# Patient Record
Sex: FEMALE | Race: BLACK OR AFRICAN AMERICAN | NOT HISPANIC OR LATINO | Employment: OTHER | URBAN - METROPOLITAN AREA
[De-identification: names, ages, dates, MRNs, and addresses within clinical notes are randomized per-mention and may not be internally consistent; named-entity substitution may affect disease eponyms.]

---

## 2017-06-01 ENCOUNTER — OFFICE VISIT (OUTPATIENT)
Dept: CARDIOLOGY | Facility: CLINIC | Age: 57
End: 2017-06-01

## 2017-06-01 ENCOUNTER — HOSPITAL ENCOUNTER (OUTPATIENT)
Dept: GENERAL RADIOLOGY | Facility: HOSPITAL | Age: 57
Discharge: HOME | End: 2017-06-01
Attending: INTERNAL MEDICINE | Admitting: INTERNAL MEDICINE

## 2017-06-01 VITALS
WEIGHT: 222 LBS | SYSTOLIC BLOOD PRESSURE: 110 MMHG | BODY MASS INDEX: 36.99 KG/M2 | HEIGHT: 65 IN | HEART RATE: 81 BPM | DIASTOLIC BLOOD PRESSURE: 78 MMHG

## 2017-06-01 DIAGNOSIS — G47.33 OSA (OBSTRUCTIVE SLEEP APNEA): ICD-10-CM

## 2017-06-01 DIAGNOSIS — R53.83 FATIGUE, UNSPECIFIED TYPE: ICD-10-CM

## 2017-06-01 DIAGNOSIS — R07.2 PRECORDIAL PAIN: Primary | ICD-10-CM

## 2017-06-01 DIAGNOSIS — R06.09 DOE (DYSPNEA ON EXERTION): ICD-10-CM

## 2017-06-01 PROBLEM — I10 HYPERTENSION: Status: ACTIVE | Noted: 2017-06-01

## 2017-06-01 PROCEDURE — 99204 OFFICE O/P NEW MOD 45 MIN: CPT | Performed by: INTERNAL MEDICINE

## 2017-06-01 PROCEDURE — 71020 HC CHEST PA AND LATERAL: CPT

## 2017-06-01 PROCEDURE — 93000 ELECTROCARDIOGRAM COMPLETE: CPT | Performed by: INTERNAL MEDICINE

## 2017-06-01 RX ORDER — GABAPENTIN 300 MG/1
200 CAPSULE ORAL EVERY EVENING
COMMUNITY
End: 2021-10-09 | Stop reason: HOSPADM

## 2017-06-01 NOTE — PROGRESS NOTES
Date of Office Visit: 2017  Encounter Provider: Ivone Espinoza MD  Place of Service: [facility] CARDIOLOGY  Patient Name: Brodie Steve  :1960    Chief complaint  Consult requested by Dr. Pierce for evaluation of chest pain shortness of breath exertional fatigue     History of Present Illness  Patient is a pleasant 56-year-old female with history of hypertension untreated sleep apnea and GE reflux disease.  In .  Been seen for chest discomfort with a negative stress echocardiogram.  Subsequent Holter year later was unremarkable He has no other prior cardiac history.  She states however over the past 3 months she has not been using her CPAP due to worsening reflux and emesis.  She has not been exercising and that time and has gained approximately 30 pounds.  In this setting over the past 3 weeks she's had midsternal chest discomfort that's nonradiating that occur sporadically last less than 5 minutes.  On occasion it can occur with exertion at times at rest and at times occur to be more positional when she is laying down.  This associated with mild shortness of breath    Past Medical History:   Diagnosis Date   • Cough    • Fibromyalgia    • Frequent urination    • Gallbladder disease    • GERD (gastroesophageal reflux disease)    • Hemorrhoids    • Hyperlipidemia    • Hypersomnolence    • Hypertension    • Nasal congestion    • JENAE (obstructive sleep apnea)     mild-severe   • Postnasal drip    • Sleep apnea    • Snoring    • Subcutaneous abscess    • Weight gain      Past Surgical History:   Procedure Laterality Date   • CHOLECYSTECTOMY     • COLONOSCOPY  approx     normal per patient   • LYMPH NODE BIOPSY     • UPPER GASTROINTESTINAL ENDOSCOPY  approx     gerd per patient     Outpatient Medications Prior to Visit   Medication Sig Dispense Refill   • celecoxib (CeleBREX) 200 MG capsule Take 200 mg by mouth Daily.     • chlorothiazide (DIURIL) 250 MG tablet  Take 250 mg by mouth Every Morning.     • Cholecalciferol (VITAMIN D3) 2000 UNITS capsule Take  by mouth.     • gabapentin (NEURONTIN) 100 MG capsule Take 200 mg by mouth Every Morning.     • guaiFENesin (MUCINEX) 600 MG 12 hr tablet Take  by mouth.     • lansoprazole (PREVACID) 30 MG capsule TAKE ONE CAPSULE BY MOUTH EVERY DAY 90 capsule 3   • loratadine (CLARITIN REDITABS) 10 MG disintegrating tablet Take 10 mg by mouth Daily.     • olmesartan (BENICAR) 20 MG tablet Take 20 mg by mouth Daily.     • Thyroid 30 MG PO tablet Take 30 mg by mouth Daily.     • Triamcinolone Acetonide (NASACORT) 55 MCG/ACT nasal inhaler 2 sprays into each nostril Daily.     • amLODIPine (NORVASC) 5 MG tablet Take 5 mg by mouth Daily.     • cholecalciferol (VITAMIN D3) 1000 UNITS tablet Take 1,000 Units by mouth Daily.     • gabapentin (NEURONTIN) 100 MG capsule Take 300 mg by mouth.     • Milnacipran HCl (SAVELLA) 12.5 MG tablet Take  by mouth.     • omeprazole (priLOSEC) 40 MG capsule Take  by mouth.     • spironolactone (ALDACTONE) 50 MG tablet Take  by mouth.     • sulfamethoxazole-trimethoprim (BACTRIM DS,SEPTRA DS) 800-160 MG per tablet Take  by mouth.       No facility-administered medications prior to visit.      Allergies as of 06/01/2017 - Pierre as Reviewed 06/01/2017   Allergen Reaction Noted   • Codeine GI Intolerance 02/11/2014   • Morphine and related Other (See Comments) 08/11/2016   • Amoxicillin Rash 02/11/2014   • Hctz [hydrochlorothiazide] Rash 02/11/2014     Social History     Social History   • Marital status:      Spouse name: N/A   • Number of children: N/A   • Years of education: N/A     Occupational History   • Not on file.     Social History Main Topics   • Smoking status: Never Smoker   • Smokeless tobacco: Not on file   • Alcohol use Yes      Comment: social   • Drug use: No   • Sexual activity: Not on file     Other Topics Concern   • Not on file     Social History Narrative     Family History   Problem  "Relation Age of Onset   • Autoimmune disease Other    • Heart disease Other      cardiac disorder   • Hypertension Other    • Thyroid disease Other    • Obesity Other    • Prostate cancer Other    • Myasthenia gravis Other    • Vitiligo Other    • Hypertension Mother    • Lung cancer Mother    • Heart disease Mother    • Stroke Mother    • Heart disease Father    • Hypertension Sister    • Hypertension Brother      Review of Systems   Constitution: Negative for fever, malaise/fatigue, weight gain and weight loss.   HENT: Negative for ear pain, hearing loss, nosebleeds and sore throat.    Eyes: Negative for double vision, pain, vision loss in left eye and vision loss in right eye.   Cardiovascular:        See history of present illness.   Respiratory: Positive for shortness of breath and snoring. Negative for cough, sleep disturbances due to breathing and wheezing.    Endocrine: Negative for cold intolerance, heat intolerance and polyuria.   Skin: Negative for itching, poor wound healing and rash.   Musculoskeletal: Negative for joint pain, joint swelling and myalgias.   Gastrointestinal: Negative for abdominal pain, diarrhea, hematochezia, nausea and vomiting.   Genitourinary: Negative for hematuria and hesitancy.   Neurological: Positive for excessive daytime sleepiness. Negative for numbness, paresthesias and seizures.   Psychiatric/Behavioral: Negative for depression. The patient is not nervous/anxious.      Objective:     Vitals:    06/01/17 1023 06/01/17 1042   BP: 114/80 110/78   BP Location: Right arm Left arm   Pulse: 81    Weight: 222 lb (101 kg)    Height: 65\" (165.1 cm)      Body mass index is 36.94 kg/(m^2).    Physical Exam   Constitutional: She is oriented to person, place, and time. She appears well-developed and well-nourished.   Obese   HENT:   Head: Normocephalic.   Nose: Nose normal.   Mouth/Throat: Oropharynx is clear and moist.   Eyes: Conjunctivae and EOM are normal. Pupils are equal, round, " and reactive to light. Right eye exhibits no discharge. No scleral icterus.   Neck: Normal range of motion. Neck supple. No JVD present. No thyromegaly present.   Cardiovascular: Normal rate, regular rhythm, normal heart sounds and intact distal pulses.  Exam reveals no gallop and no friction rub.    No murmur heard.  Pulses:       Carotid pulses are 2+ on the right side, and 2+ on the left side.       Radial pulses are 2+ on the right side, and 2+ on the left side.        Femoral pulses are 2+ on the right side, and 2+ on the left side.       Popliteal pulses are 2+ on the right side, and 2+ on the left side.        Dorsalis pedis pulses are 2+ on the right side, and 2+ on the left side.        Posterior tibial pulses are 2+ on the right side, and 2+ on the left side.   Pulmonary/Chest: Effort normal and breath sounds normal. No respiratory distress. She has no wheezes. She has no rales.   Abdominal: Soft. Bowel sounds are normal. She exhibits no distension. There is no hepatosplenomegaly. There is no tenderness. There is no rebound.   Musculoskeletal: Normal range of motion. She exhibits no edema or tenderness.   Neurological: She is alert and oriented to person, place, and time.   Skin: Skin is warm and dry. No rash noted. No erythema.   Psychiatric: She has a normal mood and affect. Her behavior is normal. Judgment and thought content normal.   Vitals reviewed.    Lab Review:     ECG 12 Lead  Date/Time: 6/1/2017 6:38 PM  Performed by: JAZMIN PENA  Authorized by: JAZMIN PENA   Comparison: not compared with previous ECG   Rhythm: sinus rhythm  Conduction comments: QTc = 455 msec  Clinical impression: normal ECG          Assessment:       Diagnosis Plan   1. JENAE (obstructive sleep apnea)  Ambulatory Referral to Sleep Medicine   2. Precordial pain  Adult Stress Echo With Color & Doppler    XR Chest 2 View   3. BESS (dyspnea on exertion)  Adult Stress Echo With Color & Doppler    XR Chest 2 View   4. Fatigue,  unspecified type       Plan:       1.  Chest pain.  Anginal and atypical features will check chest x-ray as well as a stress echocardiogram to assess for ischemic heart disease.  2.  Untreated sleep apnea.  Requested a follow-up appointment with Dr. Harman  3.  Dyspnea.  We'll check a chest x-ray  She reflux disease     CalvinBrodie mcgee   Home Medication Instructions DORINDA:    Printed on:06/04/17 7933   Medication Information                      celecoxib (CeleBREX) 200 MG capsule  Take 200 mg by mouth Daily.             chlorothiazide (DIURIL) 250 MG tablet  Take 250 mg by mouth Every Morning.             Cholecalciferol (VITAMIN D3) 2000 UNITS capsule  Take  by mouth.             Flunisolide HFA (AEROSPAN) 80 MCG/ACT aerosol solution  Inhale.             gabapentin (NEURONTIN) 100 MG capsule  Take 200 mg by mouth Every Morning.             gabapentin (NEURONTIN) 300 MG capsule  Take 300 mg by mouth Every Evening.             guaiFENesin (MUCINEX) 600 MG 12 hr tablet  Take  by mouth.             lansoprazole (PREVACID) 30 MG capsule  TAKE ONE CAPSULE BY MOUTH EVERY DAY             loratadine (CLARITIN REDITABS) 10 MG disintegrating tablet  Take 10 mg by mouth Daily.             olmesartan (BENICAR) 20 MG tablet  Take 20 mg by mouth Daily.             Thyroid 30 MG PO tablet  Take 30 mg by mouth Daily.             Triamcinolone Acetonide (NASACORT) 55 MCG/ACT nasal inhaler  2 sprays into each nostril Daily.               Dictated utilizing Dragon dictation

## 2017-06-02 ENCOUNTER — TELEPHONE (OUTPATIENT)
Dept: CARDIOLOGY | Facility: CLINIC | Age: 57
End: 2017-06-02

## 2017-06-02 NOTE — TELEPHONE ENCOUNTER
----- Message from Ivone Espinoza MD sent at 6/2/2017  3:13 PM EDT -----  Let patient know chest x-ray is normal. tita

## 2017-06-07 ENCOUNTER — HOSPITAL ENCOUNTER (OUTPATIENT)
Dept: SLEEP MEDICINE | Facility: HOSPITAL | Age: 57
Discharge: HOME | End: 2017-06-07
Admitting: INTERNAL MEDICINE

## 2017-06-07 DIAGNOSIS — R06.81 GERD WITH APNEA: ICD-10-CM

## 2017-06-07 DIAGNOSIS — G47.10 HYPERSOMNIA: ICD-10-CM

## 2017-06-07 DIAGNOSIS — E66.9 OBESITY (BMI 30-39.9): ICD-10-CM

## 2017-06-07 DIAGNOSIS — R06.83 SNORING: ICD-10-CM

## 2017-06-07 DIAGNOSIS — K21.9 GERD WITH APNEA: ICD-10-CM

## 2017-06-07 DIAGNOSIS — G47.33 OSA (OBSTRUCTIVE SLEEP APNEA): Primary | ICD-10-CM

## 2017-06-07 PROCEDURE — G0463 HOSPITAL OUTPT CLINIC VISIT: HCPCS

## 2017-06-07 NOTE — PROGRESS NOTES
HISTORY OF PRESENT ILLNESS: This is a 56-year-old female who has history of sleep apnea, was tested in 2006, and she was on a CPAP at 12 cm.  She stopped using 6 months ago. However, she also has put on about 25 to 40 pounds in weight and her symptoms are worse. She also has severe GERD. Normally, she goes to bed around 11 p.m. and wakes up at 7 a.m. and feels very tired and groggy. She does have snoring and daytime excessive sleepiness with [place] Sleepiness Scale of 17.  She snores loudly and has excessive sweating at night.     PAST MEDICAL HISTORY:   1.  Sleep apnea.   2.  Severe GERD.   3.  Hypertension.   4.  Fibromyalgia.   5.  Hypothyroidism.     MEDICATIONS:   1.  Thyroid extract.  2.  Lansoprazole 30 mg twice a day.   3.  Celebrex 200 mg in the morning.  4.  Benicar 300 mg a day.  5.  Hydrochlorothiazide 1 tablet a day.  6.  Gabapentin 100 mg 2 tablets in the morning and 300 mg at nighttime.  7.  Vitamin D.  8.  Claritin.     FAMILY HISTORY: Positive for heart disease, blood pressure, myasthenia gravis, thyroid disease, obesity, stroke, lung cancer, sleep apnea.     SOCIAL HISTORY: She is a , does not smoke cigarettes, does not use any recreational drugs. She drinks 1 or 2 glasses of alcoholic beverages per month. She does drink 3-4 cups of coffee and tea.     REVIEW OF SYSTEMS: The patient has severe GERD symptoms and also has daytime excessive sleepiness with [place] Sleepiness Scale of 17 and does have snoring and daytime tiredness and exhaustion.    PHYSICAL EXAMINATION:  GENERAL/VITAL SIGNS:  This is an adult female who is not in acute respiratory distress.  She weighs 224 pounds and she is 5 feet 5 inches tall. Blood pressure is 128/89, pulse is 85, neck size is 14-1/2 inches. Body mass index is 39.   HEENT: Unremarkable. Pupils are round, equal and reacting.   NECK: No JVD. Throat is clear. Oral airway is Mallampati class III.   RESPIRATORY: Breath sounds are equal on both sides.  No wheezes or crackles.   CARDIOVASCULAR: Heart rate is regular without murmur.   ABDOMEN: Soft.   EXTREMITIES: No cyanosis, clubbing, or edema.     ASSESSMENT AND PLAN:   1.  Obstructive sleep apnea. She does have obstructive sleep apnea, in the past she was on CPAP. However, she has put on weight and also she is not using her CPAP. She needs a repeat sleep study to reassess her sleep apnea. She also has severe GERD with sleep apnea. I am going to proceed with a split-night study and follow her after the sleep study is done.   2.  Severe GERD with sleep apnea.   3.  Hypertension.   4.  Weight gain with obesity. I have talked to the patient about weight reduction and the health benefits.   5.  History of fibromyalgia.  6.  Hypertension.   7.  Hypothyroidism.       Nael Whaley MD  RBR/pr  D:  06/07/2017 11:59:19   T:  06/07/2017 15:58:51   Job ID:  [id number]   Document ID:  [id number]  cc:  Ivone Espinoza MD

## 2017-06-14 ENCOUNTER — HOSPITAL ENCOUNTER (OUTPATIENT)
Dept: CARDIOLOGY | Facility: HOSPITAL | Age: 57
Discharge: HOME | End: 2017-06-14
Attending: INTERNAL MEDICINE | Admitting: INTERNAL MEDICINE

## 2017-06-14 VITALS
SYSTOLIC BLOOD PRESSURE: 120 MMHG | WEIGHT: 222 LBS | OXYGEN SATURATION: 99 % | DIASTOLIC BLOOD PRESSURE: 94 MMHG | HEIGHT: 65 IN | HEART RATE: 87 BPM | BODY MASS INDEX: 36.99 KG/M2

## 2017-06-14 DIAGNOSIS — R06.09 DOE (DYSPNEA ON EXERTION): ICD-10-CM

## 2017-06-14 DIAGNOSIS — R07.2 PRECORDIAL PAIN: ICD-10-CM

## 2017-06-14 LAB
BH CV ECHO MEAS - ACS: 2 CM
BH CV ECHO MEAS - AO MAX PG: 4.8 MMHG
BH CV ECHO MEAS - AO ROOT AREA (BSA CORRECTED): 1.5
BH CV ECHO MEAS - AO ROOT AREA: 7.5 CM^2
BH CV ECHO MEAS - AO ROOT DIAM: 3.1 CM
BH CV ECHO MEAS - AO V2 MAX: 109.9 CM/SEC
BH CV ECHO MEAS - BSA(HAYCOCK): 2.2 M^2
BH CV ECHO MEAS - BSA: 2.1 M^2
BH CV ECHO MEAS - BZI_BMI: 36.9 KILOGRAMS/M^2
BH CV ECHO MEAS - BZI_METRIC_HEIGHT: 165.1 CM
BH CV ECHO MEAS - BZI_METRIC_WEIGHT: 100.7 KG
BH CV ECHO MEAS - CONTRAST EF 4CH: 68.3 ML/M^2
BH CV ECHO MEAS - EDV(MOD-SP4): 82 ML
BH CV ECHO MEAS - EDV(TEICH): 92.2 ML
BH CV ECHO MEAS - EF(CUBED): 72.6 %
BH CV ECHO MEAS - EF(MOD-SP4): 68.3 %
BH CV ECHO MEAS - EF(TEICH): 64.5 %
BH CV ECHO MEAS - ESV(MOD-SP4): 26 ML
BH CV ECHO MEAS - ESV(TEICH): 32.7 ML
BH CV ECHO MEAS - FS: 35 %
BH CV ECHO MEAS - IVS/LVPW: 1
BH CV ECHO MEAS - IVSD: 0.96 CM
BH CV ECHO MEAS - LAT PEAK E' VEL: 10 CM/SEC
BH CV ECHO MEAS - LV DIASTOLIC VOL/BSA (35-75): 39.7 ML/M^2
BH CV ECHO MEAS - LV MASS(C)D: 144.7 GRAMS
BH CV ECHO MEAS - LV MASS(C)DI: 70 GRAMS/M^2
BH CV ECHO MEAS - LV SYSTOLIC VOL/BSA (12-30): 12.6 ML/M^2
BH CV ECHO MEAS - LVIDD: 4.5 CM
BH CV ECHO MEAS - LVIDS: 2.9 CM
BH CV ECHO MEAS - LVLD AP4: 7.4 CM
BH CV ECHO MEAS - LVLS AP4: 6.5 CM
BH CV ECHO MEAS - LVPWD: 0.96 CM
BH CV ECHO MEAS - MED PEAK E' VEL: 8 CM/SEC
BH CV ECHO MEAS - MV A DUR: 0.11 SEC
BH CV ECHO MEAS - MV A MAX VEL: 74.6 CM/SEC
BH CV ECHO MEAS - MV DEC SLOPE: 267.7 CM/SEC^2
BH CV ECHO MEAS - MV DEC TIME: 0.24 SEC
BH CV ECHO MEAS - MV E MAX VEL: 54.4 CM/SEC
BH CV ECHO MEAS - MV E/A: 0.73
BH CV ECHO MEAS - MV P1/2T MAX VEL: 57 CM/SEC
BH CV ECHO MEAS - MV P1/2T: 62.3 MSEC
BH CV ECHO MEAS - MVA P1/2T LCG: 3.9 CM^2
BH CV ECHO MEAS - MVA(P1/2T): 3.5 CM^2
BH CV ECHO MEAS - PULM A REVS DUR: 0.11 SEC
BH CV ECHO MEAS - PULM A REVS VEL: 26.1 CM/SEC
BH CV ECHO MEAS - PULM DIAS VEL: 44.6 CM/SEC
BH CV ECHO MEAS - PULM S/D: 0.66
BH CV ECHO MEAS - PULM SYS VEL: 29.6 CM/SEC
BH CV ECHO MEAS - SI(CUBED): 31.9 ML/M^2
BH CV ECHO MEAS - SI(MOD-SP4): 27.1 ML/M^2
BH CV ECHO MEAS - SI(TEICH): 28.7 ML/M^2
BH CV ECHO MEAS - SV(CUBED): 65.9 ML
BH CV ECHO MEAS - SV(MOD-SP4): 56 ML
BH CV ECHO MEAS - SV(TEICH): 59.4 ML
BH CV ECHO MEAS - TAPSE (>1.6): 1.7 CM2
BH CV STRESS BP STAGE 1: NORMAL
BH CV STRESS BP STAGE 2: NORMAL
BH CV STRESS DURATION MIN STAGE 1: 3
BH CV STRESS DURATION MIN STAGE 2: 3
BH CV STRESS DURATION SEC STAGE 1: 0
BH CV STRESS DURATION SEC STAGE 2: 0
BH CV STRESS ECHO POST STRESS EJECTION FRACTION EF: 78 %
BH CV STRESS GRADE STAGE 1: 10
BH CV STRESS GRADE STAGE 2: 12
BH CV STRESS HR STAGE 1: 127
BH CV STRESS HR STAGE 2: 155
BH CV STRESS METS STAGE 1: 5
BH CV STRESS METS STAGE 2: 7.5
BH CV STRESS O2 STAGE 1: 100
BH CV STRESS O2 STAGE 2: 94
BH CV STRESS PROTOCOL 1: NORMAL
BH CV STRESS SPEED STAGE 1: 1.7
BH CV STRESS SPEED STAGE 2: 2.5
BH CV STRESS STAGE 1: 1
BH CV STRESS STAGE 2: 2
BH CV XLRA - RV BASE: 2.7 CM
BH CV XLRA - TDI S': 11 CM/SEC
E/E' RATIO: 6
LEFT ATRIUM VOLUME INDEX: 9 ML/M2
MAXIMAL PREDICTED HEART RATE: 162 BPM
PERCENT MAX PREDICTED HR: 95.68 %
STRESS BASELINE BP: NORMAL MMHG
STRESS BASELINE HR: 87 BPM
STRESS PERCENT HR: 113 %
STRESS POST ESTIMATED WORKLOAD: 7.5 METS
STRESS POST EXERCISE DUR MIN: 6 MIN
STRESS POST EXERCISE DUR SEC: 0 SEC
STRESS POST O2 SAT PEAK: 94 %
STRESS POST PEAK BP: NORMAL MMHG
STRESS POST PEAK HR: 155 BPM
STRESS TARGET HR: 138 BPM

## 2017-06-14 PROCEDURE — 93352 ADMIN ECG CONTRAST AGENT: CPT | Performed by: INTERNAL MEDICINE

## 2017-06-14 PROCEDURE — 93017 CV STRESS TEST TRACING ONLY: CPT

## 2017-06-14 PROCEDURE — 93320 DOPPLER ECHO COMPLETE: CPT

## 2017-06-14 PROCEDURE — 93325 DOPPLER ECHO COLOR FLOW MAPG: CPT | Performed by: INTERNAL MEDICINE

## 2017-06-14 PROCEDURE — 93016 CV STRESS TEST SUPVJ ONLY: CPT | Performed by: INTERNAL MEDICINE

## 2017-06-14 PROCEDURE — C8928 TTE W OR W/O FOL W/CON,STRES: HCPCS

## 2017-06-14 PROCEDURE — 93320 DOPPLER ECHO COMPLETE: CPT | Performed by: INTERNAL MEDICINE

## 2017-06-14 PROCEDURE — 25010000002 PERFLUTREN (DEFINITY) 8.476 MG IN SODIUM CHLORIDE 10 ML INJECTION: Performed by: INTERNAL MEDICINE

## 2017-06-14 PROCEDURE — 93018 CV STRESS TEST I&R ONLY: CPT | Performed by: INTERNAL MEDICINE

## 2017-06-14 PROCEDURE — 93350 STRESS TTE ONLY: CPT | Performed by: INTERNAL MEDICINE

## 2017-06-14 PROCEDURE — 93325 DOPPLER ECHO COLOR FLOW MAPG: CPT

## 2017-06-14 RX ADMIN — PERFLUTREN 3 ML: 6.52 INJECTION, SUSPENSION INTRAVENOUS at 10:03

## 2017-06-15 ENCOUNTER — APPOINTMENT (OUTPATIENT)
Dept: WOMENS IMAGING | Facility: HOSPITAL | Age: 57
End: 2017-06-15

## 2017-06-15 PROCEDURE — 77080 DXA BONE DENSITY AXIAL: CPT | Performed by: RADIOLOGY

## 2017-06-15 PROCEDURE — 77067 SCR MAMMO BI INCL CAD: CPT | Performed by: RADIOLOGY

## 2017-06-15 PROCEDURE — G0202 SCR MAMMO BI INCL CAD: HCPCS | Performed by: RADIOLOGY

## 2017-06-16 ENCOUNTER — TELEPHONE (OUTPATIENT)
Dept: CARDIOLOGY | Facility: CLINIC | Age: 57
End: 2017-06-16

## 2017-06-22 ENCOUNTER — HOSPITAL ENCOUNTER (OUTPATIENT)
Dept: SLEEP MEDICINE | Facility: HOSPITAL | Age: 57
Discharge: HOME | End: 2017-06-22
Admitting: INTERNAL MEDICINE

## 2017-06-22 VITALS — WEIGHT: 224 LBS | BODY MASS INDEX: 37.32 KG/M2 | HEIGHT: 65 IN

## 2017-06-22 DIAGNOSIS — R06.81 GERD WITH APNEA: ICD-10-CM

## 2017-06-22 DIAGNOSIS — K21.9 GERD WITH APNEA: ICD-10-CM

## 2017-06-22 DIAGNOSIS — R06.83 SNORING: ICD-10-CM

## 2017-06-22 DIAGNOSIS — G47.10 HYPERSOMNIA: ICD-10-CM

## 2017-06-22 DIAGNOSIS — E66.9 OBESITY (BMI 30-39.9): ICD-10-CM

## 2017-06-22 DIAGNOSIS — G47.33 OSA (OBSTRUCTIVE SLEEP APNEA): ICD-10-CM

## 2017-06-22 PROCEDURE — 95810 POLYSOM 6/> YRS 4/> PARAM: CPT

## 2017-07-18 ENCOUNTER — TELEPHONE (OUTPATIENT)
Dept: SLEEP MEDICINE | Facility: HOSPITAL | Age: 57
End: 2017-07-18

## 2017-09-11 ENCOUNTER — OFFICE VISIT (OUTPATIENT)
Dept: CARDIOLOGY | Facility: CLINIC | Age: 57
End: 2017-09-11

## 2017-09-11 VITALS
WEIGHT: 225.4 LBS | DIASTOLIC BLOOD PRESSURE: 76 MMHG | HEIGHT: 66 IN | BODY MASS INDEX: 36.22 KG/M2 | HEART RATE: 85 BPM | SYSTOLIC BLOOD PRESSURE: 118 MMHG

## 2017-09-11 DIAGNOSIS — I10 ESSENTIAL HYPERTENSION: Primary | ICD-10-CM

## 2017-09-11 DIAGNOSIS — G47.33 OSA (OBSTRUCTIVE SLEEP APNEA): ICD-10-CM

## 2017-09-11 PROBLEM — R07.2 PRECORDIAL PAIN: Status: RESOLVED | Noted: 2017-06-01 | Resolved: 2017-09-11

## 2017-09-11 PROCEDURE — 93000 ELECTROCARDIOGRAM COMPLETE: CPT | Performed by: INTERNAL MEDICINE

## 2017-09-11 PROCEDURE — 99213 OFFICE O/P EST LOW 20 MIN: CPT | Performed by: INTERNAL MEDICINE

## 2017-09-11 RX ORDER — FLUTICASONE PROPIONATE 50 MCG
3 SPRAY, SUSPENSION (ML) NASAL DAILY
COMMUNITY

## 2017-09-11 NOTE — PROGRESS NOTES
Date of Office Visit: 2017  Encounter Provider: Ivone Espinoza MD  Place of Service: [facility] CARDIOLOGY  Patient Name: Brodie Steve  :1960    Chief complaint  Followup of chest pain, hypertension    History of Present Illness  The patient is a 56 year old female with hypertension, untreated JENAE and GERD. She was seen in 2017 with chest pain and had a negative stress echo without structural heart disease and with normal LV systolic function. She was seen by the sleep medicine docs and started on CPAP.    Since the last visit she has had no chest pain, shortness of breath, palpitations. She is walking 20-25 min at work.      Past Medical History:   Diagnosis Date   • Cough    • Fibromyalgia    • Frequent urination    • Gallbladder disease    • GERD (gastroesophageal reflux disease)    • Hemorrhoids    • Hyperlipidemia    • Hypersomnolence    • Hypertension    • Nasal congestion    • JENAE (obstructive sleep apnea)     mild-severe   • Postnasal drip    • Sleep apnea    • Snoring    • Subcutaneous abscess    • Weight gain      Past Surgical History:   Procedure Laterality Date   • CHOLECYSTECTOMY     • COLONOSCOPY  approx     normal per patient   • LYMPH NODE BIOPSY     • UPPER GASTROINTESTINAL ENDOSCOPY  approx     gerd per patient     Outpatient Medications Prior to Visit   Medication Sig Dispense Refill   • celecoxib (CeleBREX) 200 MG capsule Take 200 mg by mouth Daily.     • chlorothiazide (DIURIL) 250 MG tablet Take 250 mg by mouth Every Morning.     • Flunisolide HFA (AEROSPAN) 80 MCG/ACT aerosol solution Inhale.     • gabapentin (NEURONTIN) 100 MG capsule 1-3 tabs po PRN     • lansoprazole (PREVACID) 30 MG capsule TAKE ONE CAPSULE BY MOUTH EVERY DAY 90 capsule 3   • loratadine (CLARITIN REDITABS) 10 MG disintegrating tablet Take 10 mg by mouth Daily.     • olmesartan (BENICAR) 20 MG tablet Take 20 mg by mouth Daily.     • Cholecalciferol (VITAMIN D3)   UNITS capsule Take 1,000 Units by mouth.     • gabapentin (NEURONTIN) 300 MG capsule Take 300 mg by mouth Every Evening.     • guaiFENesin (MUCINEX) 600 MG 12 hr tablet Take  by mouth.     • Thyroid 30 MG PO tablet Take 30 mg by mouth Daily.     • Triamcinolone Acetonide (NASACORT) 55 MCG/ACT nasal inhaler 2 sprays into each nostril Daily.       No facility-administered medications prior to visit.      Allergies as of 09/11/2017 - Pierre as Reviewed 09/11/2017   Allergen Reaction Noted   • Codeine GI Intolerance 02/11/2014   • Morphine and related Other (See Comments) 08/11/2016   • Amoxicillin Rash 02/11/2014   • Hctz [hydrochlorothiazide] Rash 02/11/2014     Social History     Social History   • Marital status:      Spouse name: N/A   • Number of children: N/A   • Years of education: N/A     Occupational History   • Not on file.     Social History Main Topics   • Smoking status: Never Smoker   • Smokeless tobacco: Not on file   • Alcohol use Yes      Comment: social   • Drug use: No   • Sexual activity: Not on file     Other Topics Concern   • Not on file     Social History Narrative     Family History   Problem Relation Age of Onset   • Autoimmune disease Other    • Heart disease Other      cardiac disorder   • Hypertension Other    • Thyroid disease Other    • Obesity Other    • Prostate cancer Other    • Myasthenia gravis Other    • Vitiligo Other    • Hypertension Mother    • Lung cancer Mother    • Heart disease Mother    • Stroke Mother    • Heart disease Father    • Hypertension Sister    • Hypertension Brother      Review of Systems   Constitution: Positive for malaise/fatigue. Negative for fever, weight gain and weight loss.   HENT: Negative for ear pain, hearing loss, nosebleeds and sore throat.    Eyes: Negative for double vision, pain, vision loss in left eye and vision loss in right eye.   Cardiovascular:        See history of present illness.   Respiratory: Negative for cough, sleep  "disturbances due to breathing, snoring and wheezing.    Endocrine: Negative for cold intolerance, heat intolerance and polyuria.   Skin: Negative for itching, poor wound healing and rash.   Musculoskeletal: Negative for joint pain, joint swelling and myalgias.   Gastrointestinal: Negative for abdominal pain, diarrhea, hematochezia, nausea and vomiting.   Genitourinary: Negative for hematuria and hesitancy.   Neurological: Negative for numbness, paresthesias and seizures.   Psychiatric/Behavioral: Negative for depression. The patient is not nervous/anxious.      Objective:     Vitals:    09/11/17 0947   BP: 118/76   BP Location: Right arm   Pulse: 85   Weight: 225 lb 6.4 oz (102 kg)   Height: 65.5\" (166.4 cm)     Body mass index is 36.94 kg/(m^2).    Physical Exam   Constitutional: She is oriented to person, place, and time. She appears well-developed and well-nourished.   Obese   HENT:   Head: Normocephalic.   Nose: Nose normal.   Mouth/Throat: Oropharynx is clear and moist.   Eyes: Conjunctivae and EOM are normal. Pupils are equal, round, and reactive to light. Right eye exhibits no discharge. No scleral icterus.   Neck: Normal range of motion. Neck supple. No JVD present. No thyromegaly present.   Cardiovascular: Normal rate, regular rhythm, normal heart sounds and intact distal pulses.  Exam reveals no gallop and no friction rub.    No murmur heard.  Pulses:       Carotid pulses are 2+ on the right side, and 2+ on the left side.       Radial pulses are 2+ on the right side, and 2+ on the left side.        Femoral pulses are 2+ on the right side, and 2+ on the left side.       Popliteal pulses are 2+ on the right side, and 2+ on the left side.        Dorsalis pedis pulses are 2+ on the right side, and 2+ on the left side.        Posterior tibial pulses are 2+ on the right side, and 2+ on the left side.   Pulmonary/Chest: Effort normal and breath sounds normal. No respiratory distress. She has no wheezes. She has " no rales.   Abdominal: Soft. Bowel sounds are normal. She exhibits no distension. There is no hepatosplenomegaly. There is no tenderness. There is no rebound.   Musculoskeletal: Normal range of motion. She exhibits no edema or tenderness.   Neurological: She is alert and oriented to person, place, and time.   Skin: Skin is warm and dry. No rash noted. No erythema.   Psychiatric: She has a normal mood and affect. Her behavior is normal. Judgment and thought content normal.   Vitals reviewed.    Lab Review:     ECG 12 Lead  Date/Time: 9/11/2017 11:53 PM  Performed by: JAZMIN PENA  Authorized by: JAZMIN PENA   Comparison: compared with previous ECG   Similar to previous ECG  Rhythm: sinus rhythm  Conduction comments: QTc = 462msec  Clinical impression: normal ECG          Assessment:       Diagnosis Plan   1. Essential hypertension     2. JENAE (obstructive sleep apnea)       Plan:       1, Chest pain, resolved  2. Hypertension, controlled  3. JENAE, on therapy with CPAP       Brodie Steve   Home Medication Instructions DORINDA:    Printed on:09/12/17 5215   Medication Information                      celecoxib (CeleBREX) 200 MG capsule  Take 200 mg by mouth Daily.             chlorothiazide (DIURIL) 250 MG tablet  Take 250 mg by mouth Every Morning.             Cholecalciferol (VITAMIN D3) 2000 UNITS capsule  Take 1,000 Units by mouth.             Flunisolide HFA (AEROSPAN) 80 MCG/ACT aerosol solution  Inhale.             fluticasone (FLONASE) 50 MCG/ACT nasal spray  3 sprays into each nostril Daily.             gabapentin (NEURONTIN) 100 MG capsule  1-3 tabs po PRN             gabapentin (NEURONTIN) 300 MG capsule  Take 300 mg by mouth Every Evening.             lansoprazole (PREVACID) 30 MG capsule  TAKE ONE CAPSULE BY MOUTH EVERY DAY             loratadine (CLARITIN REDITABS) 10 MG disintegrating tablet  Take 10 mg by mouth Daily.             olmesartan (BENICAR) 20 MG tablet  Take 20 mg by mouth Daily.              Thyroid 32.5 MG PO tablet  Take 32.5 mg by mouth Daily.               Dictated utilizing Dragon dictation

## 2017-09-13 ENCOUNTER — HOSPITAL ENCOUNTER (OUTPATIENT)
Dept: SLEEP MEDICINE | Facility: HOSPITAL | Age: 57
Discharge: HOME | End: 2017-09-13
Admitting: INTERNAL MEDICINE

## 2017-09-13 PROCEDURE — G0463 HOSPITAL OUTPT CLINIC VISIT: HCPCS

## 2017-09-13 NOTE — PROGRESS NOTES
Sleep clinic follow up note.  [facility]    Brodie Steve  56 y.o.  female  1960    PCP: Angie Olivarez MD   Ref: Ivone Espinoza      Date of visit: 9/13/2017    INTERM HISTORY:  This is a 56 y.o. years old patient who has a history of sleep apnea AHI 16.5/hr and is on auto CPAP.  Patient reports good compliance with the device and has no problems.  Denies snoring, daytime excessive sleepiness.   The Smart card download has been reviewed and shows the following..  Compliance;100 %  > 4 hr use, 93 %  Residual AHI: 1.9 /hr (normal less than 5)      PAST MEDICAL HISTORY:  · Obstructive sleep apnea  · HTN  · Fibromyalgia     MEDICATIONS:    Current Outpatient Prescriptions:   •  celecoxib (CeleBREX) 200 MG capsule, Take 200 mg by mouth Daily., Disp: , Rfl:   •  chlorothiazide (DIURIL) 250 MG tablet, Take 250 mg by mouth Every Morning., Disp: , Rfl:   •  Cholecalciferol (VITAMIN D3) 2000 UNITS capsule, Take 1,000 Units by mouth., Disp: , Rfl:   •  Flunisolide HFA (AEROSPAN) 80 MCG/ACT aerosol solution, Inhale., Disp: , Rfl:   •  fluticasone (FLONASE) 50 MCG/ACT nasal spray, 3 sprays into each nostril Daily., Disp: , Rfl:   •  gabapentin (NEURONTIN) 100 MG capsule, 1-3 tabs po PRN, Disp: , Rfl:   •  gabapentin (NEURONTIN) 300 MG capsule, Take 300 mg by mouth Every Evening., Disp: , Rfl:   •  lansoprazole (PREVACID) 30 MG capsule, TAKE ONE CAPSULE BY MOUTH EVERY DAY, Disp: 90 capsule, Rfl: 3  •  loratadine (CLARITIN REDITABS) 10 MG disintegrating tablet, Take 10 mg by mouth Daily., Disp: , Rfl:   •  olmesartan (BENICAR) 20 MG tablet, Take 20 mg by mouth Daily., Disp: , Rfl:   •  Thyroid 32.5 MG PO tablet, Take 32.5 mg by mouth Daily., Disp: , Rfl:     SOCIAL, FAMILY HISTORY: Medical record is reviewed and noted.    REVIEW OF SYSTEMS: [place] Sleepiness Scale :13    PHYSICAL EXAMINATION:  BP: 106/73  Weight:221 lbs  BMI: 36  HEENT: Unremarkable, pupils are round equal and reacting to  light   NECK: No lymphadenopathy, throat is clear, oral airway Mallampati class III  RESPRATORY SYSTEM: Breath sounds are equal on both sides and are normal, no wheezes or crackles  CARDIOVASULAR SYSTEM: Heart rate is regular without murmur  ABDOMEN: Soft, no ascites, no hepatosplenomegaly.  EXTREMITIES: No cyanosis, clubbing or edema    ASSESSMENT AND PLAN:  · Obstructive sleep apnea, patient is using the CPAP with good compliance for treatment of sleep apnea.  I have reviewed the smart card down load and discussed with the patient the download data and encouarged the patient to continue to use the CPAP. The residual AHI is acceptable.  The device is benefiting the patient.  The patient is also instructed to get the CPAP supplies from the DME company and see me in 1 year for follow-up.  The DME company is [employer]  · Obesity, I have discussed weight reduction and the health benefits.  I have also discuss the relationship between the weight and the sleep apnea and encouraged the patient to lose weight  · HTN      Nael Whaley MD, FCCP  Pulmonary, Critical Care and sleep Medicine

## 2018-08-03 ENCOUNTER — APPOINTMENT (OUTPATIENT)
Dept: WOMENS IMAGING | Facility: HOSPITAL | Age: 58
End: 2018-08-03

## 2018-08-03 PROCEDURE — 77067 SCR MAMMO BI INCL CAD: CPT | Performed by: RADIOLOGY

## 2018-08-21 ENCOUNTER — APPOINTMENT (OUTPATIENT)
Dept: CT IMAGING | Facility: HOSPITAL | Age: 58
End: 2018-08-21

## 2018-08-21 ENCOUNTER — HOSPITAL ENCOUNTER (EMERGENCY)
Facility: HOSPITAL | Age: 58
Discharge: HOME | End: 2018-08-21
Attending: EMERGENCY MEDICINE | Admitting: EMERGENCY MEDICINE

## 2018-08-21 VITALS
TEMPERATURE: 98.1 F | SYSTOLIC BLOOD PRESSURE: 141 MMHG | HEART RATE: 81 BPM | WEIGHT: 222.88 LBS | OXYGEN SATURATION: 100 % | BODY MASS INDEX: 35.82 KG/M2 | RESPIRATION RATE: 16 BRPM | DIASTOLIC BLOOD PRESSURE: 97 MMHG | HEIGHT: 66 IN

## 2018-08-21 DIAGNOSIS — R10.31 RIGHT LOWER QUADRANT ABDOMINAL PAIN: Primary | ICD-10-CM

## 2018-08-21 LAB
ALBUMIN SERPL-MCNC: 4.1 G/DL (ref 3.5–5.2)
ALBUMIN/GLOB SERPL: 1.2 G/DL
ALP SERPL-CCNC: 95 U/L (ref 39–117)
ALT SERPL W P-5'-P-CCNC: 15 U/L (ref 1–33)
ANION GAP SERPL CALCULATED.3IONS-SCNC: 11.2 MMOL/L
AST SERPL-CCNC: 16 U/L (ref 1–32)
BACTERIA UR QL AUTO: NORMAL /HPF
BASOPHILS # BLD AUTO: 0.01 10*3/MM3 (ref 0–0.2)
BASOPHILS NFR BLD AUTO: 0.2 % (ref 0–1.5)
BILIRUB SERPL-MCNC: 0.4 MG/DL (ref 0.1–1.2)
BILIRUB UR QL STRIP: NEGATIVE
BUN BLD-MCNC: 8 MG/DL (ref 6–20)
BUN/CREAT SERPL: 9.4 (ref 7–25)
CALCIUM SPEC-SCNC: 9.4 MG/DL (ref 8.6–10.5)
CHLORIDE SERPL-SCNC: 103 MMOL/L (ref 98–107)
CLARITY UR: CLEAR
CO2 SERPL-SCNC: 27.8 MMOL/L (ref 22–29)
COLOR UR: YELLOW
CREAT BLD-MCNC: 0.85 MG/DL (ref 0.57–1)
DEPRECATED RDW RBC AUTO: 43.3 FL (ref 37–54)
EOSINOPHIL # BLD AUTO: 0.08 10*3/MM3 (ref 0–0.7)
EOSINOPHIL NFR BLD AUTO: 1.6 % (ref 0.3–6.2)
ERYTHROCYTE [DISTWIDTH] IN BLOOD BY AUTOMATED COUNT: 13.5 % (ref 11.7–13)
GFR SERPL CREATININE-BSD FRML MDRD: 68 ML/MIN/1.73
GLOBULIN UR ELPH-MCNC: 3.4 GM/DL
GLUCOSE BLD-MCNC: 95 MG/DL (ref 65–99)
GLUCOSE UR STRIP-MCNC: NEGATIVE MG/DL
HCT VFR BLD AUTO: 42.5 % (ref 35.6–45.5)
HGB BLD-MCNC: 14.5 G/DL (ref 11.9–15.5)
HGB UR QL STRIP.AUTO: NEGATIVE
HYALINE CASTS UR QL AUTO: NORMAL /LPF
IMM GRANULOCYTES # BLD: 0 10*3/MM3 (ref 0–0.03)
IMM GRANULOCYTES NFR BLD: 0 % (ref 0–0.5)
KETONES UR QL STRIP: NEGATIVE
LEUKOCYTE ESTERASE UR QL STRIP.AUTO: ABNORMAL
LIPASE SERPL-CCNC: 26 U/L (ref 13–60)
LYMPHOCYTES # BLD AUTO: 2.64 10*3/MM3 (ref 0.9–4.8)
LYMPHOCYTES NFR BLD AUTO: 53.1 % (ref 19.6–45.3)
MCH RBC QN AUTO: 30 PG (ref 26.9–32)
MCHC RBC AUTO-ENTMCNC: 34.1 G/DL (ref 32.4–36.3)
MCV RBC AUTO: 88 FL (ref 80.5–98.2)
MONOCYTES # BLD AUTO: 0.37 10*3/MM3 (ref 0.2–1.2)
MONOCYTES NFR BLD AUTO: 7.4 % (ref 5–12)
NEUTROPHILS # BLD AUTO: 1.87 10*3/MM3 (ref 1.9–8.1)
NEUTROPHILS NFR BLD AUTO: 37.7 % (ref 42.7–76)
NITRITE UR QL STRIP: NEGATIVE
PH UR STRIP.AUTO: 7 [PH] (ref 5–8)
PLATELET # BLD AUTO: 228 10*3/MM3 (ref 140–500)
PMV BLD AUTO: 10.7 FL (ref 6–12)
POTASSIUM BLD-SCNC: 3.9 MMOL/L (ref 3.5–5.2)
PROT SERPL-MCNC: 7.5 G/DL (ref 6–8.5)
PROT UR QL STRIP: NEGATIVE
RBC # BLD AUTO: 4.83 10*6/MM3 (ref 3.9–5.2)
RBC # UR: NORMAL /HPF
REF LAB TEST METHOD: NORMAL
SODIUM BLD-SCNC: 142 MMOL/L (ref 136–145)
SP GR UR STRIP: <1.005 (ref 1–1.03)
SQUAMOUS #/AREA URNS HPF: NORMAL /HPF
UROBILINOGEN UR QL STRIP: ABNORMAL
WBC NRBC COR # BLD: 4.97 10*3/MM3 (ref 4.5–10.7)
WBC UR QL AUTO: NORMAL /HPF

## 2018-08-21 PROCEDURE — 80053 COMPREHEN METABOLIC PANEL: CPT | Performed by: PHYSICIAN ASSISTANT

## 2018-08-21 PROCEDURE — 74177 CT ABD & PELVIS W/CONTRAST: CPT

## 2018-08-21 PROCEDURE — 83690 ASSAY OF LIPASE: CPT | Performed by: PHYSICIAN ASSISTANT

## 2018-08-21 PROCEDURE — 96374 THER/PROPH/DIAG INJ IV PUSH: CPT

## 2018-08-21 PROCEDURE — 25010000002 ONDANSETRON PER 1 MG: Performed by: PHYSICIAN ASSISTANT

## 2018-08-21 PROCEDURE — 81001 URINALYSIS AUTO W/SCOPE: CPT | Performed by: PHYSICIAN ASSISTANT

## 2018-08-21 PROCEDURE — 25010000002 IOPAMIDOL 61 % SOLUTION: Performed by: EMERGENCY MEDICINE

## 2018-08-21 PROCEDURE — 99283 EMERGENCY DEPT VISIT LOW MDM: CPT

## 2018-08-21 PROCEDURE — 85025 COMPLETE CBC W/AUTO DIFF WBC: CPT | Performed by: PHYSICIAN ASSISTANT

## 2018-08-21 PROCEDURE — 96361 HYDRATE IV INFUSION ADD-ON: CPT

## 2018-08-21 RX ORDER — ONDANSETRON 4 MG/1
4 TABLET, ORALLY DISINTEGRATING ORAL EVERY 8 HOURS PRN
Qty: 12 TABLET | Refills: 0 | Status: SHIPPED | OUTPATIENT
Start: 2018-08-21

## 2018-08-21 RX ORDER — ALBUTEROL SULFATE 90 UG/1
2 AEROSOL, METERED RESPIRATORY (INHALATION) EVERY 4 HOURS PRN
COMMUNITY

## 2018-08-21 RX ORDER — LEVOCETIRIZINE DIHYDROCHLORIDE 5 MG/1
5 TABLET, FILM COATED ORAL EVERY EVENING
COMMUNITY

## 2018-08-21 RX ORDER — SODIUM CHLORIDE 0.9 % (FLUSH) 0.9 %
10 SYRINGE (ML) INJECTION AS NEEDED
Status: DISCONTINUED | OUTPATIENT
Start: 2018-08-21 | End: 2018-08-22 | Stop reason: HOSPADM

## 2018-08-21 RX ORDER — LANOLIN ALCOHOL/MO/W.PET/CERES
5000 CREAM (GRAM) TOPICAL DAILY
COMMUNITY

## 2018-08-21 RX ORDER — ONDANSETRON 2 MG/ML
4 INJECTION INTRAMUSCULAR; INTRAVENOUS ONCE
Status: COMPLETED | OUTPATIENT
Start: 2018-08-21 | End: 2018-08-21

## 2018-08-21 RX ADMIN — SODIUM CHLORIDE 1000 ML: 9 INJECTION, SOLUTION INTRAVENOUS at 21:52

## 2018-08-21 RX ADMIN — ONDANSETRON 4 MG: 2 INJECTION, SOLUTION INTRAMUSCULAR; INTRAVENOUS at 21:52

## 2018-08-21 RX ADMIN — IOPAMIDOL 85 ML: 612 INJECTION, SOLUTION INTRAVENOUS at 22:40

## 2018-08-22 NOTE — ED NOTES
Pt reports that her right side has been hurting. Pt reports that her PCP has been trying to get her a CT . Pt reports that her pain was just too bad yesterday.      Hayley Tucker RN  08/21/18 2031

## 2018-08-22 NOTE — ED NOTES
"Pt reports RLQ pain with radiation to right flank last thursday. Pt has episodes nausea. Pt seen by PCP Thursday and recommended to get CT. Pt states \"it was just a miserable night and I couldn't take another one\".     Pt hx diverticulitis     Ivana Carranza, RN  08/21/18 2208    "

## 2018-08-22 NOTE — ED PROVIDER NOTES
EMERGENCY DEPARTMENT ENCOUNTER    CHIEF COMPLAINT  Chief Complaint: Flank Pain  History given by: Patient  History limited by: none  Room Number: 23/23  PMD: Angie Olivarez MD    HPI:  Pt is a 57 y.o. female who presents complaining of R sided flank pain radiating to RLQ for the past week. Pt confirms worsening of pain and mild nausea, but denies diarrhea, vomiting, dysuria, hematuria, and fever. Pt states she saw PCP who expressed suspicion for appendicitis and has been trying to schedule pt for CT scan, but has had issues with insurance company. Pt has hx of cholecystectomy and diverticulitis, but denies similarity of pain to any prior episodes of abd pain. Pt denies hx of nephrolithiasis.     Duration:  1 week  Onset: gradual  Timing: constant  Location: R flank  Radiation: to RLQ  Quality: pain  Intensity/Severity: mild  Progression: unchanged  Associated Symptoms: mid nausea  Aggravating Factors: none  Alleviating Factors: none  Previous Episodes: none  Treatment before arrival: none    PAST MEDICAL HISTORY  Active Ambulatory Problems     Diagnosis Date Noted   • Gastroesophageal reflux disease 12/20/2016   • Hypertension 06/01/2017   • JENAE (obstructive sleep apnea) 06/01/2017   • BESS (dyspnea on exertion) 06/01/2017   • Fatigue 06/01/2017     Resolved Ambulatory Problems     Diagnosis Date Noted   • Precordial pain 06/01/2017     Past Medical History:   Diagnosis Date   • Cough    • Fibromyalgia    • Frequent urination    • Gallbladder disease    • GERD (gastroesophageal reflux disease)    • Hemorrhoids    • Hyperlipidemia    • Hypersomnolence    • Hypertension    • Nasal congestion    • JENAE (obstructive sleep apnea)    • Postnasal drip    • Sleep apnea    • Snoring    • Subcutaneous abscess    • Weight gain        PAST SURGICAL HISTORY  Past Surgical History:   Procedure Laterality Date   • CHOLECYSTECTOMY     • COLONOSCOPY  approx 2009    normal per patient   • LYMPH NODE BIOPSY     • UPPER  GASTROINTESTINAL ENDOSCOPY  approx 2011    gerd per patient       FAMILY HISTORY  Family History   Problem Relation Age of Onset   • Autoimmune disease Other    • Heart disease Other         cardiac disorder   • Hypertension Other    • Thyroid disease Other    • Obesity Other    • Prostate cancer Other    • Myasthenia gravis Other    • Vitiligo Other    • Hypertension Mother    • Lung cancer Mother    • Heart disease Mother    • Stroke Mother    • Heart disease Father    • Hypertension Sister    • Hypertension Brother        SOCIAL HISTORY  Social History     Social History   • Marital status:      Spouse name: N/A   • Number of children: N/A   • Years of education: N/A     Occupational History   • Not on file.     Social History Main Topics   • Smoking status: Never Smoker   • Smokeless tobacco: Not on file   • Alcohol use Yes      Comment: social   • Drug use: No   • Sexual activity: Not on file     Other Topics Concern   • Not on file     Social History Narrative   • No narrative on file       ALLERGIES  Codeine; Morphine and related; Amoxicillin; and Hctz [hydrochlorothiazide]    REVIEW OF SYSTEMS  Review of Systems   Constitutional: Negative for fever.   HENT: Negative for sore throat.    Eyes: Negative.    Respiratory: Negative for cough and shortness of breath.    Cardiovascular: Negative for chest pain.   Gastrointestinal: Positive for abdominal pain (RLQ) and nausea (mild). Negative for diarrhea and vomiting.   Genitourinary: Positive for flank pain (R sided). Negative for dysuria and hematuria.   Musculoskeletal: Negative for neck pain.   Skin: Negative for rash.   Allergic/Immunologic: Negative.    Neurological: Negative for weakness, numbness and headaches.   Hematological: Negative.    Psychiatric/Behavioral: Negative.    All other systems reviewed and are negative.      PHYSICAL EXAM  ED Triage Vitals   Temp Heart Rate Resp BP SpO2   08/21/18 2031 08/21/18 2031 08/21/18 2031 08/21/18 2047  08/21/18 2031   97.1 °F (36.2 °C) 99 16 152/98 97 %      Temp src Heart Rate Source Patient Position BP Location FiO2 (%)   08/21/18 2031 08/21/18 2031 08/21/18 2047 08/21/18 2047 --   Tympanic Monitor Sitting Right arm        Physical Exam   Constitutional: She is oriented to person, place, and time. No distress.   HENT:   Head: Normocephalic and atraumatic.   Eyes: Pupils are equal, round, and reactive to light. EOM are normal.   Neck: Normal range of motion. Neck supple.   Cardiovascular: Normal rate, regular rhythm and normal heart sounds.    Pulmonary/Chest: Effort normal and breath sounds normal. No respiratory distress.   Abdominal: Soft. Bowel sounds are normal. There is tenderness in the right lower quadrant. There is no rebound, no guarding and no CVA tenderness.   Musculoskeletal: Normal range of motion. She exhibits no edema.   Neurological: She is alert and oriented to person, place, and time. She has normal sensation and normal strength.   Skin: Skin is warm and dry. No rash noted.   Psychiatric: Mood and affect normal.   Nursing note and vitals reviewed.      LAB RESULTS  Lab Results (last 24 hours)     Procedure Component Value Units Date/Time    CBC & Differential [[id number]] Collected:  08/21/18 2138    Specimen:  Blood Updated:  08/21/18 2148    Narrative:       The following orders were created for panel order CBC & Differential.  Procedure                               Abnormality         Status                     ---------                               -----------         ------                     CBC Auto Differential[[id number]]        Abnormal            Final result                 Please view results for these tests on the individual orders.    Lipase [[id number]]  (Normal) Collected:  08/21/18 2138    Specimen:  Blood Updated:  08/21/18 2206     Lipase 26 U/L     Urinalysis With Microscopic If Indicated (No Culture) - Urine, Clean Catch [[id number]]  (Abnormal) Collected:  08/21/18 2138     Specimen:  Urine from Urine, Clean Catch Updated:  08/21/18 2152     Color, UA Yellow     Appearance, UA Clear     pH, UA 7.0     Specific Gravity, UA <1.005 (L)     Glucose, UA Negative     Ketones, UA Negative     Bilirubin, UA Negative     Blood, UA Negative     Protein, UA Negative     Leuk Esterase, UA Trace (A)     Nitrite, UA Negative     Urobilinogen, UA 0.2 E.U./dL    Comprehensive Metabolic Panel [[id number]] Collected:  08/21/18 2138    Specimen:  Blood Updated:  08/21/18 2206     Glucose 95 mg/dL      BUN 8 mg/dL      Creatinine 0.85 mg/dL      Sodium 142 mmol/L      Potassium 3.9 mmol/L      Chloride 103 mmol/L      CO2 27.8 mmol/L      Calcium 9.4 mg/dL      Total Protein 7.5 g/dL      Albumin 4.10 g/dL      ALT (SGPT) 15 U/L      AST (SGOT) 16 U/L      Alkaline Phosphatase 95 U/L      Total Bilirubin 0.4 mg/dL      eGFR Non African Amer 68 mL/min/1.73      Globulin 3.4 gm/dL      A/G Ratio 1.2 g/dL      BUN/Creatinine Ratio 9.4     Anion Gap 11.2 mmol/L     CBC Auto Differential [[id number]]  (Abnormal) Collected:  08/21/18 2138    Specimen:  Blood Updated:  08/21/18 2148     WBC 4.97 10*3/mm3      RBC 4.83 10*6/mm3      Hemoglobin 14.5 g/dL      Hematocrit 42.5 %      MCV 88.0 fL      MCH 30.0 pg      MCHC 34.1 g/dL      RDW 13.5 (H) %      RDW-SD 43.3 fl      MPV 10.7 fL      Platelets 228 10*3/mm3      Neutrophil % 37.7 (L) %      Lymphocyte % 53.1 (H) %      Monocyte % 7.4 %      Eosinophil % 1.6 %      Basophil % 0.2 %      Immature Grans % 0.0 %      Neutrophils, Absolute 1.87 (L) 10*3/mm3      Lymphocytes, Absolute 2.64 10*3/mm3      Monocytes, Absolute 0.37 10*3/mm3      Eosinophils, Absolute 0.08 10*3/mm3      Basophils, Absolute 0.01 10*3/mm3      Immature Grans, Absolute 0.00 10*3/mm3     Urinalysis, Microscopic Only - Urine, Clean Catch [[id number]] Collected:  08/21/18 2138    Specimen:  Urine from Urine, Clean Catch Updated:  08/21/18 2210     RBC, UA None Seen /HPF      WBC, UA 0-2 /HPF       Bacteria, UA None Seen /HPF      Squamous Epithelial Cells, UA 0-2 /HPF      Hyaline Casts, UA None Seen /LPF      Methodology Manual Light Microscopy          I ordered the above labs and reviewed the results    RADIOLOGY  CT Abdomen Pelvis With Contrast   Final Result   IMPRESSION :    1. Stable tiny liver lesion, likely cyst.   2. Small hiatal hernia.   3. Stable uterine lesions, likely leiomyomas           This report was finalized on 8/21/2018 11:06 PM by Nii Mcallister M.D.               I ordered the above noted radiological studies. Interpreted by radiologist. Reviewed by me in PACS.       Procedures      PROGRESS AND CONSULTS     2112: Labs and CT ordered for further evaluation. Zofran ordered for nausea management.     2320: Pt rechecked and resting comfortably. Discussed negative results of labs and CT and plan to discharge. Informed pt of plan to give antiemetics and discharge to maintain a bland diet. Pt understands and agrees with plan, all questions addressed.     2330: Discussed pt's case with Dr. Humphrey, who, after bedside evaluation, agrees with plan for care.     MEDICAL DECISION MAKING  Results were reviewed/discussed with the patient and they were also made aware of online access. Pt also made aware that some labs, such as cultures, will not be resulted during ER visit and follow up with PMD is necessary.     MDM  Number of Diagnoses or Management Options     Amount and/or Complexity of Data Reviewed  Clinical lab tests: reviewed and ordered (Unremarkable labs)  Tests in the radiology section of CPT®: reviewed and ordered (CT - no acute findings. )          DIAGNOSIS   Diagnosis Plan   1. Right lower quadrant abdominal pain         DISPOSITION  DISCHARGE    Patient discharged in stable condition.    Reviewed implications of results, diagnosis, meds, responsibility to follow up, warning signs and symptoms of possible worsening, potential complications and reasons to return to  ER.    Patient/Family voiced understanding of above instructions.    Discussed plan for discharge, as there is no emergent indication for admission. Patient referred to primary care provider for BP management due to today's BP. Pt/family is agreeable and understands need for follow up and repeat testing.  Pt is aware that discharge does not mean that nothing is wrong but it indicates no emergency is present that requires admission and they must continue care with follow-up as given below or physician of their choice.     FOLLOW-UP  Angie Olivarez MD  [address] [place] [address]  [phone]    Schedule an appointment as soon as possible for a visit in 3 days           Medication List      New Prescriptions    ondansetron ODT 4 MG disintegrating tablet  Commonly known as:  ZOFRAN ODT  Take 1 tablet by mouth Every 8 (Eight) Hours As Needed for Nausea.        Stop    AEROSPAN 80 MCG/ACT aerosol solution  Generic drug:  Flunisolide HFA     CLARITIN REDITABS 10 MG disintegrating tablet  Generic drug:  loratadine              Latest Documented Vital Signs:  As of 12:36 AM  BP- 141/97 HR- 81 Temp- 98.1 °F (36.7 °C) (Oral) O2 sat- 100%    --  Documentation assistance provided by ivone Boykin for Cr Mcghee PA-C.  Information recorded by the scribe was done at my direction and has been verified and validated by me.              Angie Boykin  08/21/18 2324       Cr Mcghee PA  08/22/18 0036

## 2018-08-22 NOTE — ED PROVIDER NOTES
Pt presents to ED c/o R sided flank pain, exacerbated by movement, since 5 days ago. Pt also c/o nausea.    Upon exam, pt is A&O x3, in NAD, and has tenderness to the R flank.    Discussed with pt that her sxs are likely caused by a pulled muscle in her side. I also discussed with pt that her labs and imaging show no acute abnormalities. Plan to discharge. Pt understands and agrees with the plan, all questions answered.    MD ATTESTATION NOTE    The ARNAV and I have discussed this patient's history, physical exam, and treatment plan.  I have reviewed the documentation and personally had a face to face interaction with the patient. I affirm the documentation and agree with the treatment and plan.  The attached note describes my personal findings.    Documentation assistance provided by ivone Okeefe for Dr. Humphrey.  Information recorded by the scribe was done at my direction and has been verified and validated by me.       Nikky Okeefe  08/21/18 1590       Tommy Humphrey MD  08/22/18 8276

## 2019-01-16 ENCOUNTER — OFFICE (OUTPATIENT)
Dept: URBAN - METROPOLITAN AREA CLINIC 75 | Facility: CLINIC | Age: 59
End: 2019-01-16

## 2019-01-16 VITALS
HEIGHT: 66 IN | DIASTOLIC BLOOD PRESSURE: 72 MMHG | HEART RATE: 70 BPM | WEIGHT: 216 LBS | SYSTOLIC BLOOD PRESSURE: 130 MMHG

## 2019-01-16 DIAGNOSIS — K21.9 GASTRO-ESOPHAGEAL REFLUX DISEASE WITHOUT ESOPHAGITIS: ICD-10-CM

## 2019-01-16 DIAGNOSIS — Z83.71 FAMILY HISTORY OF COLONIC POLYPS: ICD-10-CM

## 2019-01-16 PROCEDURE — 99202 OFFICE O/P NEW SF 15 MIN: CPT

## 2019-02-22 VITALS
SYSTOLIC BLOOD PRESSURE: 123 MMHG | HEART RATE: 87 BPM | TEMPERATURE: 96.5 F | DIASTOLIC BLOOD PRESSURE: 87 MMHG | SYSTOLIC BLOOD PRESSURE: 136 MMHG | DIASTOLIC BLOOD PRESSURE: 79 MMHG | DIASTOLIC BLOOD PRESSURE: 82 MMHG | WEIGHT: 218 LBS | RESPIRATION RATE: 16 BRPM | HEART RATE: 89 BPM | RESPIRATION RATE: 8 BRPM | SYSTOLIC BLOOD PRESSURE: 146 MMHG | OXYGEN SATURATION: 93 % | HEART RATE: 75 BPM | HEART RATE: 88 BPM | OXYGEN SATURATION: 99 % | RESPIRATION RATE: 14 BRPM | OXYGEN SATURATION: 90 % | HEART RATE: 73 BPM | HEIGHT: 65 IN | HEART RATE: 83 BPM | DIASTOLIC BLOOD PRESSURE: 83 MMHG | SYSTOLIC BLOOD PRESSURE: 139 MMHG | SYSTOLIC BLOOD PRESSURE: 138 MMHG | OXYGEN SATURATION: 96 % | TEMPERATURE: 97.6 F | OXYGEN SATURATION: 100 %

## 2019-02-25 ENCOUNTER — AMBULATORY SURGICAL CENTER (OUTPATIENT)
Dept: URBAN - METROPOLITAN AREA SURGERY 17 | Facility: SURGERY | Age: 59
End: 2019-02-25

## 2019-02-25 DIAGNOSIS — K22.2 ESOPHAGEAL OBSTRUCTION: ICD-10-CM

## 2019-02-25 DIAGNOSIS — K21.9 GASTRO-ESOPHAGEAL REFLUX DISEASE WITHOUT ESOPHAGITIS: ICD-10-CM

## 2019-02-25 DIAGNOSIS — K44.9 DIAPHRAGMATIC HERNIA WITHOUT OBSTRUCTION OR GANGRENE: ICD-10-CM

## 2019-02-25 PROCEDURE — 43249 ESOPH EGD DILATION <30 MM: CPT

## 2019-02-25 RX ORDER — PANTOPRAZOLE SODIUM 40 MG/1
40 TABLET, DELAYED RELEASE ORAL
Qty: 30 | Refills: 3 | Status: COMPLETED
Start: 2019-02-25 | End: 2019-04-10

## 2019-04-07 VITALS
WEIGHT: 225 LBS | HEIGHT: 65 IN | DIASTOLIC BLOOD PRESSURE: 68 MMHG | SYSTOLIC BLOOD PRESSURE: 112 MMHG | HEART RATE: 72 BPM

## 2019-04-07 PROBLEM — K22.2 ESOPHAGEAL OBSTRUCTION: Status: ACTIVE | Noted: 2019-02-25

## 2019-04-07 PROBLEM — K44.9 DIAPHRAGMATIC HERNIA WITHOUT OBSTRUCTION OR GANGRENE: Status: ACTIVE | Noted: 2019-02-25

## 2019-04-10 ENCOUNTER — OFFICE (OUTPATIENT)
Dept: URBAN - METROPOLITAN AREA CLINIC 75 | Facility: CLINIC | Age: 59
End: 2019-04-10

## 2019-04-10 DIAGNOSIS — K44.9 DIAPHRAGMATIC HERNIA WITHOUT OBSTRUCTION OR GANGRENE: ICD-10-CM

## 2019-04-10 DIAGNOSIS — K21.9 GASTRO-ESOPHAGEAL REFLUX DISEASE WITHOUT ESOPHAGITIS: ICD-10-CM

## 2019-04-10 DIAGNOSIS — K22.2 ESOPHAGEAL OBSTRUCTION: ICD-10-CM

## 2019-04-10 PROCEDURE — 99213 OFFICE O/P EST LOW 20 MIN: CPT

## 2019-04-10 RX ORDER — PANTOPRAZOLE SODIUM 40 MG/1
40 TABLET, DELAYED RELEASE ORAL
Qty: 30 | Refills: 3 | Status: COMPLETED
Start: 2019-02-25 | End: 2019-04-10

## 2019-04-10 RX ORDER — RANITIDINE 300 MG/1
TABLET ORAL
Qty: 60 | Refills: 3 | Status: ACTIVE
Start: 2019-04-10

## 2020-12-02 ENCOUNTER — APPOINTMENT (OUTPATIENT)
Dept: WOMENS IMAGING | Facility: HOSPITAL | Age: 60
End: 2020-12-02

## 2020-12-02 PROCEDURE — 77067 SCR MAMMO BI INCL CAD: CPT | Performed by: RADIOLOGY

## 2020-12-02 PROCEDURE — 77080 DXA BONE DENSITY AXIAL: CPT | Performed by: RADIOLOGY

## 2020-12-02 PROCEDURE — 77063 BREAST TOMOSYNTHESIS BI: CPT | Performed by: RADIOLOGY

## 2021-03-16 ENCOUNTER — BULK ORDERING (OUTPATIENT)
Dept: CASE MANAGEMENT | Facility: OTHER | Age: 61
End: 2021-03-16

## 2021-03-16 DIAGNOSIS — Z23 IMMUNIZATION DUE: ICD-10-CM

## 2021-09-21 ENCOUNTER — TRANSCRIBE ORDERS (OUTPATIENT)
Dept: ADMINISTRATIVE | Facility: HOSPITAL | Age: 61
End: 2021-09-21

## 2021-09-21 DIAGNOSIS — U07.1 CLINICAL DIAGNOSIS OF SEVERE ACUTE RESPIRATORY SYNDROME CORONAVIRUS 2 (SARS-COV-2) DISEASE: Primary | ICD-10-CM

## 2021-09-21 RX ORDER — EPINEPHRINE 1 MG/ML
0.3 INJECTION, SOLUTION, CONCENTRATE INTRAVENOUS AS NEEDED
Status: CANCELLED | OUTPATIENT
Start: 2021-09-22

## 2021-09-21 RX ORDER — SODIUM CHLORIDE 9 MG/ML
30 INJECTION, SOLUTION INTRAVENOUS ONCE
Status: CANCELLED | OUTPATIENT
Start: 2021-09-22

## 2021-09-21 RX ORDER — DIPHENHYDRAMINE HYDROCHLORIDE 50 MG/ML
50 INJECTION INTRAMUSCULAR; INTRAVENOUS ONCE AS NEEDED
Status: CANCELLED | OUTPATIENT
Start: 2021-09-22

## 2021-09-21 RX ORDER — DIPHENHYDRAMINE HCL 50 MG
50 CAPSULE ORAL ONCE AS NEEDED
Status: CANCELLED | OUTPATIENT
Start: 2021-09-22

## 2021-09-21 RX ORDER — METHYLPREDNISOLONE SODIUM SUCCINATE 125 MG/2ML
125 INJECTION, POWDER, LYOPHILIZED, FOR SOLUTION INTRAMUSCULAR; INTRAVENOUS AS NEEDED
Status: CANCELLED | OUTPATIENT
Start: 2021-09-22

## 2021-09-22 ENCOUNTER — HOSPITAL ENCOUNTER (OUTPATIENT)
Dept: INFUSION THERAPY | Facility: HOSPITAL | Age: 61
Discharge: HOME | End: 2021-09-22
Admitting: INTERNAL MEDICINE

## 2021-09-22 ENCOUNTER — HOSPITAL ENCOUNTER (INPATIENT)
Facility: HOSPITAL | Age: 61
LOS: 17 days | Discharge: NURSING, REHAB OR LONG-TERM CARE | End: 2021-10-09
Attending: EMERGENCY MEDICINE | Admitting: INTERNAL MEDICINE

## 2021-09-22 ENCOUNTER — APPOINTMENT (OUTPATIENT)
Dept: GENERAL RADIOLOGY | Facility: HOSPITAL | Age: 61
End: 2021-09-22

## 2021-09-22 VITALS
TEMPERATURE: 97.8 F | OXYGEN SATURATION: 81 % | SYSTOLIC BLOOD PRESSURE: 110 MMHG | DIASTOLIC BLOOD PRESSURE: 66 MMHG | RESPIRATION RATE: 28 BRPM | HEART RATE: 118 BPM

## 2021-09-22 DIAGNOSIS — R09.02 HYPOXIA: ICD-10-CM

## 2021-09-22 DIAGNOSIS — U07.1 PNEUMONIA DUE TO COVID-19 VIRUS: Primary | ICD-10-CM

## 2021-09-22 DIAGNOSIS — G89.29 OTHER CHRONIC PAIN: ICD-10-CM

## 2021-09-22 DIAGNOSIS — J12.82 PNEUMONIA DUE TO COVID-19 VIRUS: Primary | ICD-10-CM

## 2021-09-22 DIAGNOSIS — E87.6 HYPOKALEMIA: ICD-10-CM

## 2021-09-22 DIAGNOSIS — U07.1 CLINICAL DIAGNOSIS OF COVID-19: Primary | ICD-10-CM

## 2021-09-22 LAB
ALBUMIN SERPL-MCNC: 3.6 G/DL (ref 3.5–5.2)
ALBUMIN/GLOB SERPL: 1 G/DL
ALP SERPL-CCNC: 74 U/L (ref 39–117)
ALT SERPL W P-5'-P-CCNC: 27 U/L (ref 1–33)
ANION GAP SERPL CALCULATED.3IONS-SCNC: 10.8 MMOL/L (ref 5–15)
ANISOCYTOSIS BLD QL: ABNORMAL
APTT PPP: 35.9 SECONDS (ref 22.7–35.4)
AST SERPL-CCNC: 38 U/L (ref 1–32)
BILIRUB SERPL-MCNC: 1 MG/DL (ref 0–1.2)
BUN SERPL-MCNC: 12 MG/DL (ref 8–23)
BUN/CREAT SERPL: 14.5 (ref 7–25)
CALCIUM SPEC-SCNC: 9 MG/DL (ref 8.6–10.5)
CHLORIDE SERPL-SCNC: 93 MMOL/L (ref 98–107)
CO2 SERPL-SCNC: 31.2 MMOL/L (ref 22–29)
CREAT SERPL-MCNC: 0.83 MG/DL (ref 0.57–1)
D-LACTATE SERPL-SCNC: 1.4 MMOL/L (ref 0.5–2)
DEPRECATED RDW RBC AUTO: 39.5 FL (ref 37–54)
ERYTHROCYTE [DISTWIDTH] IN BLOOD BY AUTOMATED COUNT: 13 % (ref 12.3–15.4)
GFR SERPL CREATININE-BSD FRML MDRD: 84 ML/MIN/1.73
GLOBULIN UR ELPH-MCNC: 3.6 GM/DL
GLUCOSE BLDC GLUCOMTR-MCNC: 143 MG/DL (ref 70–130)
GLUCOSE SERPL-MCNC: 143 MG/DL (ref 65–99)
HCT VFR BLD AUTO: 36 % (ref 34–46.6)
HGB BLD-MCNC: 12.5 G/DL (ref 12–15.9)
INR PPP: 1.19 (ref 0.9–1.1)
LYMPHOCYTES # BLD MANUAL: 0.73 10*3/MM3 (ref 0.7–3.1)
LYMPHOCYTES NFR BLD MANUAL: 10.1 % (ref 19.6–45.3)
LYMPHOCYTES NFR BLD MANUAL: 8.1 % (ref 5–12)
MAGNESIUM SERPL-MCNC: 2.4 MG/DL (ref 1.6–2.4)
MCH RBC QN AUTO: 29.6 PG (ref 26.6–33)
MCHC RBC AUTO-ENTMCNC: 34.7 G/DL (ref 31.5–35.7)
MCV RBC AUTO: 85.1 FL (ref 79–97)
MICROCYTES BLD QL: ABNORMAL
MONOCYTES # BLD AUTO: 0.59 10*3/MM3 (ref 0.1–0.9)
MYELOCYTES NFR BLD MANUAL: 1 % (ref 0–0)
NEUTROPHILS # BLD AUTO: 5.84 10*3/MM3 (ref 1.7–7)
NEUTROPHILS NFR BLD MANUAL: 80.8 % (ref 42.7–76)
NT-PROBNP SERPL-MCNC: 103.7 PG/ML (ref 0–900)
PLAT MORPH BLD: NORMAL
PLATELET # BLD AUTO: 284 10*3/MM3 (ref 140–450)
PMV BLD AUTO: 10 FL (ref 6–12)
POLYCHROMASIA BLD QL SMEAR: ABNORMAL
POTASSIUM SERPL-SCNC: 2.7 MMOL/L (ref 3.5–5.2)
PROCALCITONIN SERPL-MCNC: 0.16 NG/ML (ref 0–0.25)
PROT SERPL-MCNC: 7.2 G/DL (ref 6–8.5)
PROTHROMBIN TIME: 14.9 SECONDS (ref 11.7–14.2)
QT INTERVAL: 336 MS
RBC # BLD AUTO: 4.23 10*6/MM3 (ref 3.77–5.28)
SARS-COV-2 RNA RESP QL NAA+PROBE: DETECTED
SODIUM SERPL-SCNC: 135 MMOL/L (ref 136–145)
TROPONIN T SERPL-MCNC: <0.01 NG/ML (ref 0–0.03)
WBC # BLD AUTO: 7.23 10*3/MM3 (ref 3.4–10.8)
WBC MORPH BLD: NORMAL

## 2021-09-22 PROCEDURE — 71045 X-RAY EXAM CHEST 1 VIEW: CPT

## 2021-09-22 PROCEDURE — 85025 COMPLETE CBC W/AUTO DIFF WBC: CPT | Performed by: EMERGENCY MEDICINE

## 2021-09-22 PROCEDURE — 99285 EMERGENCY DEPT VISIT HI MDM: CPT

## 2021-09-22 PROCEDURE — 25010000002 DEXAMETHASONE PER 1 MG: Performed by: EMERGENCY MEDICINE

## 2021-09-22 PROCEDURE — 93005 ELECTROCARDIOGRAM TRACING: CPT | Performed by: EMERGENCY MEDICINE

## 2021-09-22 PROCEDURE — 93010 ELECTROCARDIOGRAM REPORT: CPT | Performed by: INTERNAL MEDICINE

## 2021-09-22 PROCEDURE — 87040 BLOOD CULTURE FOR BACTERIA: CPT | Performed by: EMERGENCY MEDICINE

## 2021-09-22 PROCEDURE — 84484 ASSAY OF TROPONIN QUANT: CPT | Performed by: EMERGENCY MEDICINE

## 2021-09-22 PROCEDURE — U0003 INFECTIOUS AGENT DETECTION BY NUCLEIC ACID (DNA OR RNA); SEVERE ACUTE RESPIRATORY SYNDROME CORONAVIRUS 2 (SARS-COV-2) (CORONAVIRUS DISEASE [COVID-19]), AMPLIFIED PROBE TECHNIQUE, MAKING USE OF HIGH THROUGHPUT TECHNOLOGIES AS DESCRIBED BY CMS-2020-01-R: HCPCS | Performed by: EMERGENCY MEDICINE

## 2021-09-22 PROCEDURE — 85610 PROTHROMBIN TIME: CPT | Performed by: EMERGENCY MEDICINE

## 2021-09-22 PROCEDURE — 84145 PROCALCITONIN (PCT): CPT | Performed by: EMERGENCY MEDICINE

## 2021-09-22 PROCEDURE — 25010000002 ONDANSETRON PER 1 MG: Performed by: INTERNAL MEDICINE

## 2021-09-22 PROCEDURE — 85730 THROMBOPLASTIN TIME PARTIAL: CPT | Performed by: EMERGENCY MEDICINE

## 2021-09-22 PROCEDURE — 82962 GLUCOSE BLOOD TEST: CPT

## 2021-09-22 PROCEDURE — 85007 BL SMEAR W/DIFF WBC COUNT: CPT | Performed by: EMERGENCY MEDICINE

## 2021-09-22 PROCEDURE — 83880 ASSAY OF NATRIURETIC PEPTIDE: CPT | Performed by: EMERGENCY MEDICINE

## 2021-09-22 PROCEDURE — G0463 HOSPITAL OUTPT CLINIC VISIT: HCPCS

## 2021-09-22 PROCEDURE — 83735 ASSAY OF MAGNESIUM: CPT | Performed by: EMERGENCY MEDICINE

## 2021-09-22 PROCEDURE — 80053 COMPREHEN METABOLIC PANEL: CPT | Performed by: EMERGENCY MEDICINE

## 2021-09-22 PROCEDURE — 83605 ASSAY OF LACTIC ACID: CPT | Performed by: EMERGENCY MEDICINE

## 2021-09-22 PROCEDURE — XW033E5 INTRODUCTION OF REMDESIVIR ANTI-INFECTIVE INTO PERIPHERAL VEIN, PERCUTANEOUS APPROACH, NEW TECHNOLOGY GROUP 5: ICD-10-PCS | Performed by: INTERNAL MEDICINE

## 2021-09-22 RX ORDER — CHOLECALCIFEROL (VITAMIN D3) 125 MCG
2000 CAPSULE ORAL DAILY
Status: DISCONTINUED | OUTPATIENT
Start: 2021-09-23 | End: 2021-09-27

## 2021-09-22 RX ORDER — DIPHENHYDRAMINE HCL 50 MG
50 CAPSULE ORAL ONCE AS NEEDED
Status: CANCELLED | OUTPATIENT
Start: 2021-09-22

## 2021-09-22 RX ORDER — PANTOPRAZOLE SODIUM 40 MG/1
40 TABLET, DELAYED RELEASE ORAL EVERY MORNING
Status: DISCONTINUED | OUTPATIENT
Start: 2021-09-23 | End: 2021-09-29

## 2021-09-22 RX ORDER — DEXAMETHASONE SODIUM PHOSPHATE 10 MG/ML
6 INJECTION INTRAMUSCULAR; INTRAVENOUS DAILY
Status: DISCONTINUED | OUTPATIENT
Start: 2021-09-23 | End: 2021-09-23

## 2021-09-22 RX ORDER — ONDANSETRON 4 MG/1
4 TABLET, FILM COATED ORAL EVERY 6 HOURS PRN
Status: DISCONTINUED | OUTPATIENT
Start: 2021-09-22 | End: 2021-09-30

## 2021-09-22 RX ORDER — POTASSIUM CHLORIDE 7.45 MG/ML
10 INJECTION INTRAVENOUS
Status: DISCONTINUED | OUTPATIENT
Start: 2021-09-22 | End: 2021-10-09 | Stop reason: HOSPADM

## 2021-09-22 RX ORDER — ACETAMINOPHEN 325 MG/1
650 TABLET ORAL EVERY 4 HOURS PRN
Status: DISCONTINUED | OUTPATIENT
Start: 2021-09-22 | End: 2021-09-29

## 2021-09-22 RX ORDER — INDAPAMIDE 1.25 MG/1
1.25 TABLET, FILM COATED ORAL DAILY
COMMUNITY
Start: 2021-09-10 | End: 2021-10-09 | Stop reason: HOSPADM

## 2021-09-22 RX ORDER — METHYLPREDNISOLONE SODIUM SUCCINATE 125 MG/2ML
125 INJECTION, POWDER, LYOPHILIZED, FOR SOLUTION INTRAMUSCULAR; INTRAVENOUS AS NEEDED
Status: CANCELLED | OUTPATIENT
Start: 2021-09-22

## 2021-09-22 RX ORDER — DIPHENHYDRAMINE HCL 50 MG
50 CAPSULE ORAL ONCE AS NEEDED
Status: DISCONTINUED | OUTPATIENT
Start: 2021-09-22 | End: 2021-09-24 | Stop reason: HOSPADM

## 2021-09-22 RX ORDER — DEXTROSE MONOHYDRATE 25 G/50ML
25 INJECTION, SOLUTION INTRAVENOUS
Status: DISCONTINUED | OUTPATIENT
Start: 2021-09-22 | End: 2021-10-09 | Stop reason: HOSPADM

## 2021-09-22 RX ORDER — GABAPENTIN 100 MG/1
100 CAPSULE ORAL DAILY
Status: DISCONTINUED | OUTPATIENT
Start: 2021-09-23 | End: 2021-09-29

## 2021-09-22 RX ORDER — POTASSIUM CHLORIDE 750 MG/1
40 TABLET, FILM COATED, EXTENDED RELEASE ORAL AS NEEDED
Status: DISCONTINUED | OUTPATIENT
Start: 2021-09-22 | End: 2021-09-29

## 2021-09-22 RX ORDER — CETIRIZINE HYDROCHLORIDE 10 MG/1
10 TABLET ORAL DAILY
Status: DISCONTINUED | OUTPATIENT
Start: 2021-09-23 | End: 2021-09-27

## 2021-09-22 RX ORDER — INSULIN LISPRO 100 [IU]/ML
0-9 INJECTION, SOLUTION INTRAVENOUS; SUBCUTANEOUS
Status: DISCONTINUED | OUTPATIENT
Start: 2021-09-23 | End: 2021-09-29

## 2021-09-22 RX ORDER — DIPHENHYDRAMINE HYDROCHLORIDE 50 MG/ML
50 INJECTION INTRAMUSCULAR; INTRAVENOUS ONCE AS NEEDED
Status: CANCELLED | OUTPATIENT
Start: 2021-09-22

## 2021-09-22 RX ORDER — LOSARTAN POTASSIUM 50 MG/1
50 TABLET ORAL
Status: DISCONTINUED | OUTPATIENT
Start: 2021-09-23 | End: 2021-09-29

## 2021-09-22 RX ORDER — POTASSIUM CHLORIDE 1.5 G/1.77G
40 POWDER, FOR SOLUTION ORAL AS NEEDED
Status: DISCONTINUED | OUTPATIENT
Start: 2021-09-22 | End: 2021-10-09 | Stop reason: HOSPADM

## 2021-09-22 RX ORDER — NICOTINE POLACRILEX 4 MG
15 LOZENGE BUCCAL
Status: DISCONTINUED | OUTPATIENT
Start: 2021-09-22 | End: 2021-10-09 | Stop reason: HOSPADM

## 2021-09-22 RX ORDER — SODIUM CHLORIDE AND POTASSIUM CHLORIDE 150; 900 MG/100ML; MG/100ML
100 INJECTION, SOLUTION INTRAVENOUS CONTINUOUS
Status: DISCONTINUED | OUTPATIENT
Start: 2021-09-22 | End: 2021-09-23

## 2021-09-22 RX ORDER — DEXAMETHASONE SODIUM PHOSPHATE 10 MG/ML
6 INJECTION INTRAMUSCULAR; INTRAVENOUS ONCE
Status: COMPLETED | OUTPATIENT
Start: 2021-09-22 | End: 2021-09-22

## 2021-09-22 RX ORDER — NITROGLYCERIN 0.4 MG/1
0.4 TABLET SUBLINGUAL
Status: DISCONTINUED | OUTPATIENT
Start: 2021-09-22 | End: 2021-10-09 | Stop reason: HOSPADM

## 2021-09-22 RX ORDER — METHYLPREDNISOLONE SODIUM SUCCINATE 125 MG/2ML
125 INJECTION, POWDER, LYOPHILIZED, FOR SOLUTION INTRAMUSCULAR; INTRAVENOUS AS NEEDED
Status: DISCONTINUED | OUTPATIENT
Start: 2021-09-22 | End: 2021-09-24 | Stop reason: HOSPADM

## 2021-09-22 RX ORDER — MELATONIN
2000 DAILY
Status: DISCONTINUED | OUTPATIENT
Start: 2021-09-23 | End: 2021-09-27

## 2021-09-22 RX ORDER — DIPHENHYDRAMINE HYDROCHLORIDE 50 MG/ML
50 INJECTION INTRAMUSCULAR; INTRAVENOUS ONCE AS NEEDED
Status: DISCONTINUED | OUTPATIENT
Start: 2021-09-22 | End: 2021-09-24 | Stop reason: HOSPADM

## 2021-09-22 RX ORDER — ONDANSETRON 2 MG/ML
4 INJECTION INTRAMUSCULAR; INTRAVENOUS EVERY 6 HOURS PRN
Status: DISCONTINUED | OUTPATIENT
Start: 2021-09-22 | End: 2021-09-30

## 2021-09-22 RX ORDER — LEVOTHYROXINE AND LIOTHYRONINE 19; 4.5 UG/1; UG/1
32.5 TABLET ORAL DAILY
Status: DISCONTINUED | OUTPATIENT
Start: 2021-09-23 | End: 2021-09-27

## 2021-09-22 RX ORDER — UREA 10 %
3 LOTION (ML) TOPICAL NIGHTLY PRN
Status: DISCONTINUED | OUTPATIENT
Start: 2021-09-22 | End: 2021-09-29

## 2021-09-22 RX ORDER — ALBUTEROL SULFATE 90 UG/1
2 AEROSOL, METERED RESPIRATORY (INHALATION) EVERY 4 HOURS PRN
Status: DISCONTINUED | OUTPATIENT
Start: 2021-09-22 | End: 2021-10-09 | Stop reason: HOSPADM

## 2021-09-22 RX ORDER — SODIUM CHLORIDE 9 MG/ML
30 INJECTION, SOLUTION INTRAVENOUS ONCE
Status: CANCELLED | OUTPATIENT
Start: 2021-09-22

## 2021-09-22 RX ORDER — SODIUM CHLORIDE 9 MG/ML
30 INJECTION, SOLUTION INTRAVENOUS ONCE
Status: DISCONTINUED | OUTPATIENT
Start: 2021-09-22 | End: 2021-09-24 | Stop reason: HOSPADM

## 2021-09-22 RX ORDER — POTASSIUM CHLORIDE 750 MG/1
40 TABLET, FILM COATED, EXTENDED RELEASE ORAL
Status: COMPLETED | OUTPATIENT
Start: 2021-09-22 | End: 2021-09-22

## 2021-09-22 RX ORDER — GABAPENTIN 100 MG/1
200 CAPSULE ORAL EVERY EVENING
Status: DISCONTINUED | OUTPATIENT
Start: 2021-09-23 | End: 2021-09-29

## 2021-09-22 RX ORDER — DEXLANSOPRAZOLE 60 MG/1
60 CAPSULE, DELAYED RELEASE ORAL DAILY
COMMUNITY
Start: 2021-06-21 | End: 2021-10-09 | Stop reason: HOSPADM

## 2021-09-22 RX ADMIN — POTASSIUM CHLORIDE 40 MEQ: 750 TABLET, EXTENDED RELEASE ORAL at 17:11

## 2021-09-22 RX ADMIN — ONDANSETRON 4 MG: 2 INJECTION INTRAMUSCULAR; INTRAVENOUS at 23:49

## 2021-09-22 RX ADMIN — SODIUM CHLORIDE 500 ML: 9 INJECTION, SOLUTION INTRAVENOUS at 15:37

## 2021-09-22 RX ADMIN — REMDESIVIR 200 MG: 100 INJECTION, POWDER, LYOPHILIZED, FOR SOLUTION INTRAVENOUS at 23:14

## 2021-09-22 RX ADMIN — ACETAMINOPHEN 650 MG: 325 TABLET, FILM COATED ORAL at 23:42

## 2021-09-22 RX ADMIN — DEXAMETHASONE SODIUM PHOSPHATE 6 MG: 10 INJECTION INTRAMUSCULAR; INTRAVENOUS at 15:31

## 2021-09-22 RX ADMIN — POTASSIUM CHLORIDE 40 MEQ: 750 TABLET, EXTENDED RELEASE ORAL at 17:59

## 2021-09-22 NOTE — PROGRESS NOTES
RN into room at 1430, patient noted to be moaning/graoning with each respiration. RR 28/minute. See Vital signs. O2 sat 81% on Room Air. 100% Non-rebreather applied to patient. RN called report to Darlene in ER at 1435. Patient transferred via wheelchair with portable O2 tank on 100% Non re-breather and gave report to ER triage desk nurse, Jeimy at 1448. Fellow RN called patient's daughter, Jillian to inform her of patient being unable to receive monoclonal infusion due to low O2 sats and patient being transferred to ER. Patient's daughter informed that ER can be called to inquire about patient's condition.  RN called patient's physician, Dr. Olivarez at 1550 to notify that patient was able to receive infusion and was transferred to ER.

## 2021-09-23 ENCOUNTER — APPOINTMENT (OUTPATIENT)
Dept: GENERAL RADIOLOGY | Facility: HOSPITAL | Age: 61
End: 2021-09-23

## 2021-09-23 PROBLEM — U07.1 ACUTE HYPOXEMIC RESPIRATORY FAILURE DUE TO COVID-19: Status: ACTIVE | Noted: 2021-09-23

## 2021-09-23 PROBLEM — IMO0002 TYPE II DIABETES MELLITUS, UNCONTROLLED: Status: ACTIVE | Noted: 2021-09-23

## 2021-09-23 PROBLEM — J96.01 ACUTE HYPOXEMIC RESPIRATORY FAILURE DUE TO COVID-19 (HCC): Status: ACTIVE | Noted: 2021-09-23

## 2021-09-23 PROBLEM — E66.9 OBESITY (BMI 30-39.9): Status: ACTIVE | Noted: 2021-09-23

## 2021-09-23 LAB
ALBUMIN SERPL-MCNC: 3.3 G/DL (ref 3.5–5.2)
ALP SERPL-CCNC: 68 U/L (ref 39–117)
ALT SERPL W P-5'-P-CCNC: 26 U/L (ref 1–33)
ANION GAP SERPL CALCULATED.3IONS-SCNC: 12.8 MMOL/L (ref 5–15)
ARTERIAL PATENCY WRIST A: POSITIVE
AST SERPL-CCNC: 34 U/L (ref 1–32)
ATMOSPHERIC PRESS: 749.1 MMHG
BASE EXCESS BLDA CALC-SCNC: 5.5 MMOL/L (ref 0–2)
BDY SITE: ABNORMAL
BILIRUB CONJ SERPL-MCNC: 0.4 MG/DL (ref 0–0.3)
BILIRUB INDIRECT SERPL-MCNC: 0.4 MG/DL
BILIRUB SERPL-MCNC: 0.8 MG/DL (ref 0–1.2)
BUN SERPL-MCNC: 11 MG/DL (ref 8–23)
BUN/CREAT SERPL: 14.3 (ref 7–25)
CALCIUM SPEC-SCNC: 8.7 MG/DL (ref 8.6–10.5)
CHLORIDE SERPL-SCNC: 98 MMOL/L (ref 98–107)
CK SERPL-CCNC: 153 U/L (ref 20–180)
CO2 SERPL-SCNC: 27.2 MMOL/L (ref 22–29)
CREAT SERPL-MCNC: 0.77 MG/DL (ref 0.57–1)
CRP SERPL-MCNC: 24.67 MG/DL (ref 0–0.5)
D DIMER PPP FEU-MCNC: 1.66 MCGFEU/ML (ref 0–0.49)
DEPRECATED RDW RBC AUTO: 42.9 FL (ref 37–54)
ERYTHROCYTE [DISTWIDTH] IN BLOOD BY AUTOMATED COUNT: 13 % (ref 12.3–15.4)
FERRITIN SERPL-MCNC: 1121 NG/ML (ref 13–150)
FIBRINOGEN PPP-MCNC: 690 MG/DL (ref 219–464)
GAS FLOW AIRWAY: 15 LPM
GFR SERPL CREATININE-BSD FRML MDRD: 92 ML/MIN/1.73
GLUCOSE BLDC GLUCOMTR-MCNC: 130 MG/DL (ref 70–130)
GLUCOSE BLDC GLUCOMTR-MCNC: 142 MG/DL (ref 70–130)
GLUCOSE BLDC GLUCOMTR-MCNC: 176 MG/DL (ref 70–130)
GLUCOSE BLDC GLUCOMTR-MCNC: 178 MG/DL (ref 70–130)
GLUCOSE BLDC GLUCOMTR-MCNC: 191 MG/DL (ref 70–130)
GLUCOSE BLDC GLUCOMTR-MCNC: 228 MG/DL (ref 70–130)
GLUCOSE SERPL-MCNC: 161 MG/DL (ref 65–99)
HBA1C MFR BLD: 7.8 % (ref 4.8–5.6)
HCO3 BLDA-SCNC: 29 MMOL/L (ref 22–28)
HCT VFR BLD AUTO: 36.2 % (ref 34–46.6)
HGB BLD-MCNC: 12 G/DL (ref 12–15.9)
LDH SERPL-CCNC: 517 U/L (ref 135–214)
MAGNESIUM SERPL-MCNC: 2.5 MG/DL (ref 1.6–2.4)
MCH RBC QN AUTO: 29.5 PG (ref 26.6–33)
MCHC RBC AUTO-ENTMCNC: 33.1 G/DL (ref 31.5–35.7)
MCV RBC AUTO: 88.9 FL (ref 79–97)
MODALITY: ABNORMAL
PCO2 BLDA: 37.6 MM HG (ref 35–45)
PH BLDA: 7.5 PH UNITS (ref 7.35–7.45)
PLATELET # BLD AUTO: 300 10*3/MM3 (ref 140–450)
PMV BLD AUTO: 9.8 FL (ref 6–12)
PO2 BLDA: 61.3 MM HG (ref 80–100)
POTASSIUM SERPL-SCNC: 3.1 MMOL/L (ref 3.5–5.2)
PROT SERPL-MCNC: 6.8 G/DL (ref 6–8.5)
RBC # BLD AUTO: 4.07 10*6/MM3 (ref 3.77–5.28)
SAO2 % BLDCOA: 93.1 % (ref 92–99)
SODIUM SERPL-SCNC: 138 MMOL/L (ref 136–145)
TOTAL RATE: 28 BREATHS/MINUTE
WBC # BLD AUTO: 8.07 10*3/MM3 (ref 3.4–10.8)

## 2021-09-23 PROCEDURE — 25010000002 ENOXAPARIN PER 10 MG: Performed by: INTERNAL MEDICINE

## 2021-09-23 PROCEDURE — 94799 UNLISTED PULMONARY SVC/PX: CPT

## 2021-09-23 PROCEDURE — 36600 WITHDRAWAL OF ARTERIAL BLOOD: CPT

## 2021-09-23 PROCEDURE — 82550 ASSAY OF CK (CPK): CPT | Performed by: INTERNAL MEDICINE

## 2021-09-23 PROCEDURE — 83615 LACTATE (LD) (LDH) ENZYME: CPT | Performed by: INTERNAL MEDICINE

## 2021-09-23 PROCEDURE — 25010000003 POTASSIUM CHLORIDE 10 MEQ/100ML SOLUTION: Performed by: NURSE PRACTITIONER

## 2021-09-23 PROCEDURE — 25010000002 SODIUM CHLORIDE 0.9 % WITH KCL 20 MEQ 20-0.9 MEQ/L-% SOLUTION: Performed by: INTERNAL MEDICINE

## 2021-09-23 PROCEDURE — 82962 GLUCOSE BLOOD TEST: CPT

## 2021-09-23 PROCEDURE — 80076 HEPATIC FUNCTION PANEL: CPT | Performed by: INTERNAL MEDICINE

## 2021-09-23 PROCEDURE — 80048 BASIC METABOLIC PNL TOTAL CA: CPT | Performed by: INTERNAL MEDICINE

## 2021-09-23 PROCEDURE — 85384 FIBRINOGEN ACTIVITY: CPT | Performed by: INTERNAL MEDICINE

## 2021-09-23 PROCEDURE — 85027 COMPLETE CBC AUTOMATED: CPT | Performed by: INTERNAL MEDICINE

## 2021-09-23 PROCEDURE — 71045 X-RAY EXAM CHEST 1 VIEW: CPT

## 2021-09-23 PROCEDURE — 25010000002 DEXAMETHASONE PER 1 MG: Performed by: INTERNAL MEDICINE

## 2021-09-23 PROCEDURE — 83036 HEMOGLOBIN GLYCOSYLATED A1C: CPT | Performed by: INTERNAL MEDICINE

## 2021-09-23 PROCEDURE — 85379 FIBRIN DEGRADATION QUANT: CPT | Performed by: INTERNAL MEDICINE

## 2021-09-23 PROCEDURE — 86140 C-REACTIVE PROTEIN: CPT | Performed by: INTERNAL MEDICINE

## 2021-09-23 PROCEDURE — 36415 COLL VENOUS BLD VENIPUNCTURE: CPT | Performed by: INTERNAL MEDICINE

## 2021-09-23 PROCEDURE — 83735 ASSAY OF MAGNESIUM: CPT | Performed by: HOSPITALIST

## 2021-09-23 PROCEDURE — 82728 ASSAY OF FERRITIN: CPT | Performed by: INTERNAL MEDICINE

## 2021-09-23 PROCEDURE — 25010000003 POTASSIUM CHLORIDE 10 MEQ/100ML SOLUTION: Performed by: INTERNAL MEDICINE

## 2021-09-23 PROCEDURE — 82803 BLOOD GASES ANY COMBINATION: CPT

## 2021-09-23 RX ORDER — POTASSIUM CHLORIDE 7.45 MG/ML
10 INJECTION INTRAVENOUS
Status: DISCONTINUED | OUTPATIENT
Start: 2021-09-23 | End: 2021-10-09 | Stop reason: HOSPADM

## 2021-09-23 RX ORDER — ALPRAZOLAM 0.25 MG/1
0.25 TABLET ORAL EVERY 8 HOURS PRN
Status: DISCONTINUED | OUTPATIENT
Start: 2021-09-23 | End: 2021-09-29

## 2021-09-23 RX ORDER — DEXAMETHASONE SODIUM PHOSPHATE 10 MG/ML
6 INJECTION INTRAMUSCULAR; INTRAVENOUS EVERY 12 HOURS
Status: DISCONTINUED | OUTPATIENT
Start: 2021-09-23 | End: 2021-09-23

## 2021-09-23 RX ORDER — BUDESONIDE AND FORMOTEROL FUMARATE DIHYDRATE 80; 4.5 UG/1; UG/1
2 AEROSOL RESPIRATORY (INHALATION)
Status: DISCONTINUED | OUTPATIENT
Start: 2021-09-23 | End: 2021-10-02

## 2021-09-23 RX ORDER — BENZONATATE 100 MG/1
200 CAPSULE ORAL 3 TIMES DAILY PRN
Status: DISCONTINUED | OUTPATIENT
Start: 2021-09-23 | End: 2021-09-29

## 2021-09-23 RX ORDER — POTASSIUM CHLORIDE 750 MG/1
40 TABLET, FILM COATED, EXTENDED RELEASE ORAL AS NEEDED
Status: DISCONTINUED | OUTPATIENT
Start: 2021-09-23 | End: 2021-10-09 | Stop reason: HOSPADM

## 2021-09-23 RX ORDER — POTASSIUM CHLORIDE 1.5 G/1.77G
40 POWDER, FOR SOLUTION ORAL AS NEEDED
Status: DISCONTINUED | OUTPATIENT
Start: 2021-09-23 | End: 2021-10-09 | Stop reason: HOSPADM

## 2021-09-23 RX ORDER — DEXAMETHASONE SODIUM PHOSPHATE 10 MG/ML
10 INJECTION INTRAMUSCULAR; INTRAVENOUS EVERY 12 HOURS
Status: DISCONTINUED | OUTPATIENT
Start: 2021-09-24 | End: 2021-09-30

## 2021-09-23 RX ORDER — DEXTROMETHORPHAN POLISTIREX 30 MG/5ML
60 SUSPENSION ORAL EVERY 12 HOURS SCHEDULED
Status: DISCONTINUED | OUTPATIENT
Start: 2021-09-23 | End: 2021-09-29

## 2021-09-23 RX ADMIN — LEVOTHYROXINE, LIOTHYRONINE 30 MG: 19; 4.5 TABLET ORAL at 11:32

## 2021-09-23 RX ADMIN — BUDESONIDE AND FORMOTEROL FUMARATE DIHYDRATE 2 PUFF: 80; 4.5 AEROSOL RESPIRATORY (INHALATION) at 20:49

## 2021-09-23 RX ADMIN — ENOXAPARIN SODIUM 40 MG: 40 INJECTION SUBCUTANEOUS at 06:47

## 2021-09-23 RX ADMIN — ENOXAPARIN SODIUM 40 MG: 40 INJECTION SUBCUTANEOUS at 18:13

## 2021-09-23 RX ADMIN — POTASSIUM CHLORIDE 10 MEQ: 7.46 INJECTION, SOLUTION INTRAVENOUS at 09:39

## 2021-09-23 RX ADMIN — DEXAMETHASONE SODIUM PHOSPHATE 6 MG: 10 INJECTION INTRAMUSCULAR; INTRAVENOUS at 18:12

## 2021-09-23 RX ADMIN — LOSARTAN POTASSIUM 50 MG: 50 TABLET, FILM COATED ORAL at 11:32

## 2021-09-23 RX ADMIN — DEXAMETHASONE SODIUM PHOSPHATE 6 MG: 10 INJECTION INTRAMUSCULAR; INTRAVENOUS at 06:47

## 2021-09-23 RX ADMIN — ALPRAZOLAM 0.25 MG: 0.25 TABLET ORAL at 11:32

## 2021-09-23 RX ADMIN — POTASSIUM CHLORIDE 10 MEQ: 7.46 INJECTION, SOLUTION INTRAVENOUS at 11:32

## 2021-09-23 RX ADMIN — REMDESIVIR 100 MG: 100 INJECTION, POWDER, LYOPHILIZED, FOR SOLUTION INTRAVENOUS at 18:13

## 2021-09-23 RX ADMIN — GABAPENTIN 200 MG: 100 CAPSULE ORAL at 16:41

## 2021-09-23 RX ADMIN — DEXTROMETHORPHAN POLISTIREX 60 MG: 30 SUSPENSION, EXTENDED RELEASE ORAL at 20:35

## 2021-09-23 RX ADMIN — POTASSIUM CHLORIDE 10 MEQ: 7.46 INJECTION, SOLUTION INTRAVENOUS at 13:32

## 2021-09-23 RX ADMIN — ALPRAZOLAM 0.25 MG: 0.25 TABLET ORAL at 20:35

## 2021-09-23 RX ADMIN — DEXAMETHASONE SODIUM PHOSPHATE 6 MG: 10 INJECTION INTRAMUSCULAR; INTRAVENOUS at 11:33

## 2021-09-23 RX ADMIN — BENZONATATE 200 MG: 100 CAPSULE ORAL at 16:51

## 2021-09-23 RX ADMIN — POTASSIUM CHLORIDE AND SODIUM CHLORIDE 100 ML/HR: 900; 150 INJECTION, SOLUTION INTRAVENOUS at 01:01

## 2021-09-23 RX ADMIN — DEXTROMETHORPHAN POLISTIREX 60 MG: 30 SUSPENSION, EXTENDED RELEASE ORAL at 11:32

## 2021-09-24 LAB
ALBUMIN SERPL-MCNC: 3.3 G/DL (ref 3.5–5.2)
ALBUMIN SERPL-MCNC: 3.3 G/DL (ref 3.5–5.2)
ALBUMIN/GLOB SERPL: 0.9 G/DL
ALP SERPL-CCNC: 73 U/L (ref 39–117)
ALP SERPL-CCNC: 74 U/L (ref 39–117)
ALT SERPL W P-5'-P-CCNC: 22 U/L (ref 1–33)
ALT SERPL W P-5'-P-CCNC: 22 U/L (ref 1–33)
ANION GAP SERPL CALCULATED.3IONS-SCNC: 13.2 MMOL/L (ref 5–15)
AST SERPL-CCNC: 27 U/L (ref 1–32)
AST SERPL-CCNC: 28 U/L (ref 1–32)
BILIRUB CONJ SERPL-MCNC: 0.4 MG/DL (ref 0–0.3)
BILIRUB INDIRECT SERPL-MCNC: 0.4 MG/DL
BILIRUB SERPL-MCNC: 0.8 MG/DL (ref 0–1.2)
BILIRUB SERPL-MCNC: 0.9 MG/DL (ref 0–1.2)
BUN SERPL-MCNC: 19 MG/DL (ref 8–23)
BUN/CREAT SERPL: 27.9 (ref 7–25)
CALCIUM SPEC-SCNC: 8.9 MG/DL (ref 8.6–10.5)
CHLORIDE SERPL-SCNC: 101 MMOL/L (ref 98–107)
CK SERPL-CCNC: 79 U/L (ref 20–180)
CO2 SERPL-SCNC: 26.8 MMOL/L (ref 22–29)
CREAT SERPL-MCNC: 0.67 MG/DL (ref 0.57–1)
CREAT SERPL-MCNC: 0.68 MG/DL (ref 0.57–1)
CRP SERPL-MCNC: 20.24 MG/DL (ref 0–0.5)
DEPRECATED RDW RBC AUTO: 43.7 FL (ref 37–54)
ERYTHROCYTE [DISTWIDTH] IN BLOOD BY AUTOMATED COUNT: 13.4 % (ref 12.3–15.4)
FERRITIN SERPL-MCNC: 1295 NG/ML (ref 13–150)
GFR SERPL CREATININE-BSD FRML MDRD: 106 ML/MIN/1.73
GFR SERPL CREATININE-BSD FRML MDRD: 108 ML/MIN/1.73
GLOBULIN UR ELPH-MCNC: 3.7 GM/DL
GLUCOSE BLDC GLUCOMTR-MCNC: 216 MG/DL (ref 70–130)
GLUCOSE BLDC GLUCOMTR-MCNC: 226 MG/DL (ref 70–130)
GLUCOSE BLDC GLUCOMTR-MCNC: 250 MG/DL (ref 70–130)
GLUCOSE SERPL-MCNC: 207 MG/DL (ref 65–99)
HCT VFR BLD AUTO: 36.6 % (ref 34–46.6)
HGB BLD-MCNC: 12.5 G/DL (ref 12–15.9)
MAGNESIUM SERPL-MCNC: 2.6 MG/DL (ref 1.6–2.4)
MCH RBC QN AUTO: 30 PG (ref 26.6–33)
MCHC RBC AUTO-ENTMCNC: 34.2 G/DL (ref 31.5–35.7)
MCV RBC AUTO: 87.8 FL (ref 79–97)
PLATELET # BLD AUTO: 388 10*3/MM3 (ref 140–450)
PMV BLD AUTO: 9.8 FL (ref 6–12)
POTASSIUM SERPL-SCNC: 3.3 MMOL/L (ref 3.5–5.2)
POTASSIUM SERPL-SCNC: 3.5 MMOL/L (ref 3.5–5.2)
PROCALCITONIN SERPL-MCNC: 0.17 NG/ML (ref 0–0.25)
PROT SERPL-MCNC: 6.9 G/DL (ref 6–8.5)
PROT SERPL-MCNC: 7 G/DL (ref 6–8.5)
RBC # BLD AUTO: 4.17 10*6/MM3 (ref 3.77–5.28)
SODIUM SERPL-SCNC: 141 MMOL/L (ref 136–145)
WBC # BLD AUTO: 7.13 10*3/MM3 (ref 3.4–10.8)

## 2021-09-24 PROCEDURE — 94660 CPAP INITIATION&MGMT: CPT

## 2021-09-24 PROCEDURE — 94760 N-INVAS EAR/PLS OXIMETRY 1: CPT

## 2021-09-24 PROCEDURE — 84145 PROCALCITONIN (PCT): CPT | Performed by: INTERNAL MEDICINE

## 2021-09-24 PROCEDURE — 25010000002 ENOXAPARIN PER 10 MG: Performed by: INTERNAL MEDICINE

## 2021-09-24 PROCEDURE — 86140 C-REACTIVE PROTEIN: CPT | Performed by: INTERNAL MEDICINE

## 2021-09-24 PROCEDURE — 80076 HEPATIC FUNCTION PANEL: CPT | Performed by: INTERNAL MEDICINE

## 2021-09-24 PROCEDURE — 94799 UNLISTED PULMONARY SVC/PX: CPT

## 2021-09-24 PROCEDURE — 85027 COMPLETE CBC AUTOMATED: CPT | Performed by: INTERNAL MEDICINE

## 2021-09-24 PROCEDURE — 87040 BLOOD CULTURE FOR BACTERIA: CPT | Performed by: INTERNAL MEDICINE

## 2021-09-24 PROCEDURE — 94640 AIRWAY INHALATION TREATMENT: CPT

## 2021-09-24 PROCEDURE — 83735 ASSAY OF MAGNESIUM: CPT | Performed by: INTERNAL MEDICINE

## 2021-09-24 PROCEDURE — 82550 ASSAY OF CK (CPK): CPT | Performed by: INTERNAL MEDICINE

## 2021-09-24 PROCEDURE — 82962 GLUCOSE BLOOD TEST: CPT

## 2021-09-24 PROCEDURE — 80053 COMPREHEN METABOLIC PANEL: CPT | Performed by: INTERNAL MEDICINE

## 2021-09-24 PROCEDURE — 82728 ASSAY OF FERRITIN: CPT | Performed by: INTERNAL MEDICINE

## 2021-09-24 PROCEDURE — 25010000002 DEXAMETHASONE PER 1 MG: Performed by: INTERNAL MEDICINE

## 2021-09-24 PROCEDURE — 63710000001 INSULIN LISPRO (HUMAN) PER 5 UNITS: Performed by: INTERNAL MEDICINE

## 2021-09-24 PROCEDURE — XW033H5 INTRODUCTION OF TOCILIZUMAB INTO PERIPHERAL VEIN, PERCUTANEOUS APPROACH, NEW TECHNOLOGY GROUP 5: ICD-10-PCS | Performed by: INTERNAL MEDICINE

## 2021-09-24 PROCEDURE — 25010000002 TOCILIZUMAB 200 MG/10ML SOLUTION 10 ML VIAL: Performed by: INTERNAL MEDICINE

## 2021-09-24 PROCEDURE — 84132 ASSAY OF SERUM POTASSIUM: CPT | Performed by: HOSPITALIST

## 2021-09-24 PROCEDURE — 82565 ASSAY OF CREATININE: CPT | Performed by: INTERNAL MEDICINE

## 2021-09-24 RX ADMIN — DEXTROMETHORPHAN POLISTIREX 60 MG: 30 SUSPENSION, EXTENDED RELEASE ORAL at 20:32

## 2021-09-24 RX ADMIN — LEVOTHYROXINE, LIOTHYRONINE 30 MG: 19; 4.5 TABLET ORAL at 08:39

## 2021-09-24 RX ADMIN — Medication 2000 MCG: at 08:39

## 2021-09-24 RX ADMIN — LOSARTAN POTASSIUM 50 MG: 50 TABLET, FILM COATED ORAL at 08:39

## 2021-09-24 RX ADMIN — CETIRIZINE HYDROCHLORIDE 10 MG: 10 TABLET ORAL at 08:39

## 2021-09-24 RX ADMIN — BUDESONIDE AND FORMOTEROL FUMARATE DIHYDRATE 2 PUFF: 80; 4.5 AEROSOL RESPIRATORY (INHALATION) at 11:49

## 2021-09-24 RX ADMIN — Medication 2000 UNITS: at 08:39

## 2021-09-24 RX ADMIN — BENZONATATE 200 MG: 100 CAPSULE ORAL at 06:39

## 2021-09-24 RX ADMIN — TOCILIZUMAB 800 MG: 20 INJECTION, SOLUTION, CONCENTRATE INTRAVENOUS at 20:32

## 2021-09-24 RX ADMIN — ENOXAPARIN SODIUM 40 MG: 40 INJECTION SUBCUTANEOUS at 06:35

## 2021-09-24 RX ADMIN — DEXTROMETHORPHAN POLISTIREX 60 MG: 30 SUSPENSION, EXTENDED RELEASE ORAL at 08:39

## 2021-09-24 RX ADMIN — REMDESIVIR 100 MG: 100 INJECTION, POWDER, LYOPHILIZED, FOR SOLUTION INTRAVENOUS at 18:21

## 2021-09-24 RX ADMIN — INSULIN LISPRO 4 UNITS: 100 INJECTION, SOLUTION INTRAVENOUS; SUBCUTANEOUS at 18:21

## 2021-09-24 RX ADMIN — DEXAMETHASONE SODIUM PHOSPHATE 10 MG: 10 INJECTION INTRAMUSCULAR; INTRAVENOUS at 18:21

## 2021-09-24 RX ADMIN — DEXAMETHASONE SODIUM PHOSPHATE 10 MG: 10 INJECTION INTRAMUSCULAR; INTRAVENOUS at 06:35

## 2021-09-24 RX ADMIN — PANTOPRAZOLE SODIUM 40 MG: 40 TABLET, DELAYED RELEASE ORAL at 06:35

## 2021-09-24 RX ADMIN — INSULIN LISPRO 4 UNITS: 100 INJECTION, SOLUTION INTRAVENOUS; SUBCUTANEOUS at 08:50

## 2021-09-24 RX ADMIN — POTASSIUM CHLORIDE 40 MEQ: 750 TABLET, EXTENDED RELEASE ORAL at 08:39

## 2021-09-24 RX ADMIN — ENOXAPARIN SODIUM 40 MG: 40 INJECTION SUBCUTANEOUS at 18:21

## 2021-09-24 RX ADMIN — INSULIN LISPRO 6 UNITS: 100 INJECTION, SOLUTION INTRAVENOUS; SUBCUTANEOUS at 12:33

## 2021-09-24 RX ADMIN — ALBUTEROL SULFATE 2 PUFF: 90 AEROSOL, METERED RESPIRATORY (INHALATION) at 06:49

## 2021-09-25 LAB
ALBUMIN SERPL-MCNC: 2.9 G/DL (ref 3.5–5.2)
ALBUMIN/GLOB SERPL: 0.7 G/DL
ALP SERPL-CCNC: 71 U/L (ref 39–117)
ALT SERPL W P-5'-P-CCNC: 20 U/L (ref 1–33)
ANION GAP SERPL CALCULATED.3IONS-SCNC: 16.5 MMOL/L (ref 5–15)
AST SERPL-CCNC: 26 U/L (ref 1–32)
BILIRUB SERPL-MCNC: 0.8 MG/DL (ref 0–1.2)
BUN SERPL-MCNC: 23 MG/DL (ref 8–23)
BUN/CREAT SERPL: 31.1 (ref 7–25)
CALCIUM SPEC-SCNC: 9 MG/DL (ref 8.6–10.5)
CHLORIDE SERPL-SCNC: 103 MMOL/L (ref 98–107)
CK SERPL-CCNC: 51 U/L (ref 20–180)
CO2 SERPL-SCNC: 20.5 MMOL/L (ref 22–29)
CREAT SERPL-MCNC: 0.74 MG/DL (ref 0.57–1)
CRP SERPL-MCNC: 9.8 MG/DL (ref 0–0.5)
D DIMER PPP FEU-MCNC: 0.68 MCGFEU/ML (ref 0–0.49)
DEPRECATED RDW RBC AUTO: 43.3 FL (ref 37–54)
ERYTHROCYTE [DISTWIDTH] IN BLOOD BY AUTOMATED COUNT: 13.5 % (ref 12.3–15.4)
FERRITIN SERPL-MCNC: 1557 NG/ML (ref 13–150)
GFR SERPL CREATININE-BSD FRML MDRD: 96 ML/MIN/1.73
GLOBULIN UR ELPH-MCNC: 3.9 GM/DL
GLUCOSE BLDC GLUCOMTR-MCNC: 169 MG/DL (ref 70–130)
GLUCOSE BLDC GLUCOMTR-MCNC: 232 MG/DL (ref 70–130)
GLUCOSE SERPL-MCNC: 225 MG/DL (ref 65–99)
HCT VFR BLD AUTO: 36.4 % (ref 34–46.6)
HGB BLD-MCNC: 12.5 G/DL (ref 12–15.9)
MCH RBC QN AUTO: 30 PG (ref 26.6–33)
MCHC RBC AUTO-ENTMCNC: 34.3 G/DL (ref 31.5–35.7)
MCV RBC AUTO: 87.5 FL (ref 79–97)
PLATELET # BLD AUTO: 441 10*3/MM3 (ref 140–450)
PMV BLD AUTO: 10 FL (ref 6–12)
POTASSIUM SERPL-SCNC: 4.4 MMOL/L (ref 3.5–5.2)
PROT SERPL-MCNC: 6.8 G/DL (ref 6–8.5)
RBC # BLD AUTO: 4.16 10*6/MM3 (ref 3.77–5.28)
SODIUM SERPL-SCNC: 140 MMOL/L (ref 136–145)
WBC # BLD AUTO: 7.37 10*3/MM3 (ref 3.4–10.8)

## 2021-09-25 PROCEDURE — 80053 COMPREHEN METABOLIC PANEL: CPT | Performed by: INTERNAL MEDICINE

## 2021-09-25 PROCEDURE — 25010000002 ENOXAPARIN PER 10 MG: Performed by: INTERNAL MEDICINE

## 2021-09-25 PROCEDURE — 94799 UNLISTED PULMONARY SVC/PX: CPT

## 2021-09-25 PROCEDURE — 94760 N-INVAS EAR/PLS OXIMETRY 1: CPT

## 2021-09-25 PROCEDURE — 82728 ASSAY OF FERRITIN: CPT | Performed by: INTERNAL MEDICINE

## 2021-09-25 PROCEDURE — 63710000001 INSULIN GLARGINE PER 5 UNITS: Performed by: INTERNAL MEDICINE

## 2021-09-25 PROCEDURE — 85379 FIBRIN DEGRADATION QUANT: CPT | Performed by: INTERNAL MEDICINE

## 2021-09-25 PROCEDURE — 86140 C-REACTIVE PROTEIN: CPT | Performed by: INTERNAL MEDICINE

## 2021-09-25 PROCEDURE — 82962 GLUCOSE BLOOD TEST: CPT

## 2021-09-25 PROCEDURE — 82550 ASSAY OF CK (CPK): CPT | Performed by: INTERNAL MEDICINE

## 2021-09-25 PROCEDURE — 85027 COMPLETE CBC AUTOMATED: CPT | Performed by: INTERNAL MEDICINE

## 2021-09-25 PROCEDURE — 25010000002 DEXAMETHASONE PER 1 MG: Performed by: INTERNAL MEDICINE

## 2021-09-25 PROCEDURE — 94660 CPAP INITIATION&MGMT: CPT

## 2021-09-25 PROCEDURE — 63710000001 INSULIN LISPRO (HUMAN) PER 5 UNITS: Performed by: INTERNAL MEDICINE

## 2021-09-25 RX ORDER — INSULIN GLARGINE 100 [IU]/ML
10 INJECTION, SOLUTION SUBCUTANEOUS EVERY 12 HOURS SCHEDULED
Status: DISCONTINUED | OUTPATIENT
Start: 2021-09-25 | End: 2021-09-30

## 2021-09-25 RX ADMIN — ALPRAZOLAM 0.25 MG: 0.25 TABLET ORAL at 17:49

## 2021-09-25 RX ADMIN — ENOXAPARIN SODIUM 40 MG: 40 INJECTION SUBCUTANEOUS at 06:07

## 2021-09-25 RX ADMIN — DEXAMETHASONE SODIUM PHOSPHATE 10 MG: 10 INJECTION INTRAMUSCULAR; INTRAVENOUS at 05:56

## 2021-09-25 RX ADMIN — GABAPENTIN 200 MG: 100 CAPSULE ORAL at 17:49

## 2021-09-25 RX ADMIN — INSULIN LISPRO 4 UNITS: 100 INJECTION, SOLUTION INTRAVENOUS; SUBCUTANEOUS at 12:30

## 2021-09-25 RX ADMIN — GABAPENTIN 100 MG: 100 CAPSULE ORAL at 09:00

## 2021-09-25 RX ADMIN — DEXTROMETHORPHAN POLISTIREX 60 MG: 30 SUSPENSION, EXTENDED RELEASE ORAL at 21:03

## 2021-09-25 RX ADMIN — ENOXAPARIN SODIUM 40 MG: 40 INJECTION SUBCUTANEOUS at 18:09

## 2021-09-25 RX ADMIN — INSULIN LISPRO 5 UNITS: 100 INJECTION, SOLUTION INTRAVENOUS; SUBCUTANEOUS at 09:00

## 2021-09-25 RX ADMIN — INSULIN GLARGINE 10 UNITS: 100 INJECTION, SOLUTION SUBCUTANEOUS at 21:03

## 2021-09-25 RX ADMIN — DEXAMETHASONE SODIUM PHOSPHATE 10 MG: 10 INJECTION INTRAMUSCULAR; INTRAVENOUS at 21:03

## 2021-09-25 RX ADMIN — INSULIN LISPRO 2 UNITS: 100 INJECTION, SOLUTION INTRAVENOUS; SUBCUTANEOUS at 17:51

## 2021-09-25 RX ADMIN — BUDESONIDE AND FORMOTEROL FUMARATE DIHYDRATE 2 PUFF: 80; 4.5 AEROSOL RESPIRATORY (INHALATION) at 20:36

## 2021-09-25 RX ADMIN — REMDESIVIR 100 MG: 100 INJECTION, POWDER, LYOPHILIZED, FOR SOLUTION INTRAVENOUS at 17:49

## 2021-09-25 RX ADMIN — PANTOPRAZOLE SODIUM 40 MG: 40 TABLET, DELAYED RELEASE ORAL at 06:08

## 2021-09-25 RX ADMIN — DEXTROMETHORPHAN POLISTIREX 60 MG: 30 SUSPENSION, EXTENDED RELEASE ORAL at 09:00

## 2021-09-25 RX ADMIN — LOSARTAN POTASSIUM 50 MG: 50 TABLET, FILM COATED ORAL at 08:59

## 2021-09-25 RX ADMIN — LEVOTHYROXINE, LIOTHYRONINE 30 MG: 19; 4.5 TABLET ORAL at 09:00

## 2021-09-25 RX ADMIN — CETIRIZINE HYDROCHLORIDE 10 MG: 10 TABLET ORAL at 08:59

## 2021-09-25 RX ADMIN — Medication 2000 MCG: at 08:59

## 2021-09-25 RX ADMIN — Medication 2000 UNITS: at 08:59

## 2021-09-26 LAB
ALBUMIN SERPL-MCNC: 3 G/DL (ref 3.5–5.2)
ALBUMIN/GLOB SERPL: 0.9 G/DL
ALP SERPL-CCNC: 64 U/L (ref 39–117)
ALT SERPL W P-5'-P-CCNC: 20 U/L (ref 1–33)
ANION GAP SERPL CALCULATED.3IONS-SCNC: 10.8 MMOL/L (ref 5–15)
AST SERPL-CCNC: 24 U/L (ref 1–32)
BILIRUB SERPL-MCNC: 0.6 MG/DL (ref 0–1.2)
BUN SERPL-MCNC: 26 MG/DL (ref 8–23)
BUN/CREAT SERPL: 37.7 (ref 7–25)
CALCIUM SPEC-SCNC: 8.9 MG/DL (ref 8.6–10.5)
CHLORIDE SERPL-SCNC: 106 MMOL/L (ref 98–107)
CK SERPL-CCNC: 48 U/L (ref 20–180)
CO2 SERPL-SCNC: 28.2 MMOL/L (ref 22–29)
CREAT SERPL-MCNC: 0.69 MG/DL (ref 0.57–1)
CRP SERPL-MCNC: 4.89 MG/DL (ref 0–0.5)
DEPRECATED RDW RBC AUTO: 41.7 FL (ref 37–54)
ERYTHROCYTE [DISTWIDTH] IN BLOOD BY AUTOMATED COUNT: 13.1 % (ref 12.3–15.4)
FERRITIN SERPL-MCNC: 1212 NG/ML (ref 13–150)
GFR SERPL CREATININE-BSD FRML MDRD: 104 ML/MIN/1.73
GLOBULIN UR ELPH-MCNC: 3.4 GM/DL
GLUCOSE BLDC GLUCOMTR-MCNC: 152 MG/DL (ref 70–130)
GLUCOSE BLDC GLUCOMTR-MCNC: 182 MG/DL (ref 70–130)
GLUCOSE BLDC GLUCOMTR-MCNC: 184 MG/DL (ref 70–130)
GLUCOSE BLDC GLUCOMTR-MCNC: 198 MG/DL (ref 70–130)
GLUCOSE SERPL-MCNC: 185 MG/DL (ref 65–99)
HCT VFR BLD AUTO: 38.6 % (ref 34–46.6)
HGB BLD-MCNC: 13.1 G/DL (ref 12–15.9)
MCH RBC QN AUTO: 29.7 PG (ref 26.6–33)
MCHC RBC AUTO-ENTMCNC: 33.9 G/DL (ref 31.5–35.7)
MCV RBC AUTO: 87.5 FL (ref 79–97)
PLATELET # BLD AUTO: 410 10*3/MM3 (ref 140–450)
PMV BLD AUTO: 10 FL (ref 6–12)
POTASSIUM SERPL-SCNC: 3.9 MMOL/L (ref 3.5–5.2)
PROCALCITONIN SERPL-MCNC: 0.09 NG/ML (ref 0–0.25)
PROT SERPL-MCNC: 6.4 G/DL (ref 6–8.5)
RBC # BLD AUTO: 4.41 10*6/MM3 (ref 3.77–5.28)
SODIUM SERPL-SCNC: 145 MMOL/L (ref 136–145)
WBC # BLD AUTO: 5.24 10*3/MM3 (ref 3.4–10.8)

## 2021-09-26 PROCEDURE — 94799 UNLISTED PULMONARY SVC/PX: CPT

## 2021-09-26 PROCEDURE — 94760 N-INVAS EAR/PLS OXIMETRY 1: CPT

## 2021-09-26 PROCEDURE — 63710000001 INSULIN LISPRO (HUMAN) PER 5 UNITS: Performed by: INTERNAL MEDICINE

## 2021-09-26 PROCEDURE — 84145 PROCALCITONIN (PCT): CPT | Performed by: INTERNAL MEDICINE

## 2021-09-26 PROCEDURE — 85027 COMPLETE CBC AUTOMATED: CPT | Performed by: INTERNAL MEDICINE

## 2021-09-26 PROCEDURE — 82728 ASSAY OF FERRITIN: CPT | Performed by: INTERNAL MEDICINE

## 2021-09-26 PROCEDURE — 63710000001 INSULIN GLARGINE PER 5 UNITS: Performed by: INTERNAL MEDICINE

## 2021-09-26 PROCEDURE — 82550 ASSAY OF CK (CPK): CPT | Performed by: INTERNAL MEDICINE

## 2021-09-26 PROCEDURE — 86140 C-REACTIVE PROTEIN: CPT | Performed by: INTERNAL MEDICINE

## 2021-09-26 PROCEDURE — 25010000002 DEXAMETHASONE PER 1 MG: Performed by: INTERNAL MEDICINE

## 2021-09-26 PROCEDURE — 80053 COMPREHEN METABOLIC PANEL: CPT | Performed by: INTERNAL MEDICINE

## 2021-09-26 PROCEDURE — 82962 GLUCOSE BLOOD TEST: CPT

## 2021-09-26 PROCEDURE — 25010000002 ENOXAPARIN PER 10 MG: Performed by: INTERNAL MEDICINE

## 2021-09-26 RX ADMIN — BUDESONIDE AND FORMOTEROL FUMARATE DIHYDRATE 2 PUFF: 80; 4.5 AEROSOL RESPIRATORY (INHALATION) at 07:56

## 2021-09-26 RX ADMIN — INSULIN GLARGINE 10 UNITS: 100 INJECTION, SOLUTION SUBCUTANEOUS at 22:00

## 2021-09-26 RX ADMIN — LEVOTHYROXINE, LIOTHYRONINE 30 MG: 19; 4.5 TABLET ORAL at 09:06

## 2021-09-26 RX ADMIN — INSULIN LISPRO 2 UNITS: 100 INJECTION, SOLUTION INTRAVENOUS; SUBCUTANEOUS at 09:05

## 2021-09-26 RX ADMIN — CETIRIZINE HYDROCHLORIDE 10 MG: 10 TABLET ORAL at 09:05

## 2021-09-26 RX ADMIN — INSULIN LISPRO 2 UNITS: 100 INJECTION, SOLUTION INTRAVENOUS; SUBCUTANEOUS at 18:24

## 2021-09-26 RX ADMIN — INSULIN LISPRO 2 UNITS: 100 INJECTION, SOLUTION INTRAVENOUS; SUBCUTANEOUS at 13:00

## 2021-09-26 RX ADMIN — Medication 2000 UNITS: at 09:05

## 2021-09-26 RX ADMIN — REMDESIVIR 100 MG: 100 INJECTION, POWDER, LYOPHILIZED, FOR SOLUTION INTRAVENOUS at 18:24

## 2021-09-26 RX ADMIN — BUDESONIDE AND FORMOTEROL FUMARATE DIHYDRATE 2 PUFF: 80; 4.5 AEROSOL RESPIRATORY (INHALATION) at 20:19

## 2021-09-26 RX ADMIN — DEXTROMETHORPHAN POLISTIREX 60 MG: 30 SUSPENSION, EXTENDED RELEASE ORAL at 09:03

## 2021-09-26 RX ADMIN — GABAPENTIN 100 MG: 100 CAPSULE ORAL at 09:06

## 2021-09-26 RX ADMIN — DEXAMETHASONE SODIUM PHOSPHATE 10 MG: 10 INJECTION INTRAMUSCULAR; INTRAVENOUS at 06:13

## 2021-09-26 RX ADMIN — ENOXAPARIN SODIUM 40 MG: 40 INJECTION SUBCUTANEOUS at 06:13

## 2021-09-26 RX ADMIN — Medication 2000 MCG: at 09:06

## 2021-09-26 RX ADMIN — LOSARTAN POTASSIUM 50 MG: 50 TABLET, FILM COATED ORAL at 09:05

## 2021-09-26 RX ADMIN — INSULIN GLARGINE 10 UNITS: 100 INJECTION, SOLUTION SUBCUTANEOUS at 09:38

## 2021-09-26 RX ADMIN — DEXAMETHASONE SODIUM PHOSPHATE 10 MG: 10 INJECTION INTRAMUSCULAR; INTRAVENOUS at 18:24

## 2021-09-26 RX ADMIN — GABAPENTIN 200 MG: 100 CAPSULE ORAL at 18:24

## 2021-09-26 RX ADMIN — PANTOPRAZOLE SODIUM 40 MG: 40 TABLET, DELAYED RELEASE ORAL at 06:13

## 2021-09-26 RX ADMIN — ENOXAPARIN SODIUM 40 MG: 40 INJECTION SUBCUTANEOUS at 18:24

## 2021-09-26 RX ADMIN — DEXTROMETHORPHAN POLISTIREX 60 MG: 30 SUSPENSION, EXTENDED RELEASE ORAL at 21:59

## 2021-09-27 LAB
ALBUMIN SERPL-MCNC: 3.1 G/DL (ref 3.5–5.2)
ALBUMIN/GLOB SERPL: 0.9 G/DL
ALP SERPL-CCNC: 64 U/L (ref 39–117)
ALT SERPL W P-5'-P-CCNC: 19 U/L (ref 1–33)
ANION GAP SERPL CALCULATED.3IONS-SCNC: 11.9 MMOL/L (ref 5–15)
AST SERPL-CCNC: 21 U/L (ref 1–32)
BACTERIA SPEC AEROBE CULT: NORMAL
BACTERIA SPEC AEROBE CULT: NORMAL
BILIRUB SERPL-MCNC: 0.7 MG/DL (ref 0–1.2)
BUN SERPL-MCNC: 28 MG/DL (ref 8–23)
BUN/CREAT SERPL: 41.2 (ref 7–25)
CALCIUM SPEC-SCNC: 8.8 MG/DL (ref 8.6–10.5)
CHLORIDE SERPL-SCNC: 106 MMOL/L (ref 98–107)
CK SERPL-CCNC: 34 U/L (ref 20–180)
CO2 SERPL-SCNC: 26.1 MMOL/L (ref 22–29)
CREAT SERPL-MCNC: 0.68 MG/DL (ref 0.57–1)
CRP SERPL-MCNC: 3.12 MG/DL (ref 0–0.5)
D DIMER PPP FEU-MCNC: 0.61 MCGFEU/ML (ref 0–0.49)
DEPRECATED RDW RBC AUTO: 43.7 FL (ref 37–54)
ERYTHROCYTE [DISTWIDTH] IN BLOOD BY AUTOMATED COUNT: 13.2 % (ref 12.3–15.4)
FERRITIN SERPL-MCNC: 1056 NG/ML (ref 13–150)
GFR SERPL CREATININE-BSD FRML MDRD: 106 ML/MIN/1.73
GLOBULIN UR ELPH-MCNC: 3.5 GM/DL
GLUCOSE BLDC GLUCOMTR-MCNC: 143 MG/DL (ref 70–130)
GLUCOSE BLDC GLUCOMTR-MCNC: 149 MG/DL (ref 70–130)
GLUCOSE BLDC GLUCOMTR-MCNC: 151 MG/DL (ref 70–130)
GLUCOSE BLDC GLUCOMTR-MCNC: 165 MG/DL (ref 70–130)
GLUCOSE SERPL-MCNC: 165 MG/DL (ref 65–99)
HCT VFR BLD AUTO: 42 % (ref 34–46.6)
HGB BLD-MCNC: 13.9 G/DL (ref 12–15.9)
MCH RBC QN AUTO: 29.4 PG (ref 26.6–33)
MCHC RBC AUTO-ENTMCNC: 33.1 G/DL (ref 31.5–35.7)
MCV RBC AUTO: 89 FL (ref 79–97)
PLATELET # BLD AUTO: 370 10*3/MM3 (ref 140–450)
PMV BLD AUTO: 10.4 FL (ref 6–12)
POTASSIUM SERPL-SCNC: 3.8 MMOL/L (ref 3.5–5.2)
PROT SERPL-MCNC: 6.6 G/DL (ref 6–8.5)
RBC # BLD AUTO: 4.72 10*6/MM3 (ref 3.77–5.28)
SODIUM SERPL-SCNC: 144 MMOL/L (ref 136–145)
WBC # BLD AUTO: 4.91 10*3/MM3 (ref 3.4–10.8)

## 2021-09-27 PROCEDURE — 85379 FIBRIN DEGRADATION QUANT: CPT | Performed by: INTERNAL MEDICINE

## 2021-09-27 PROCEDURE — 63710000001 INSULIN LISPRO (HUMAN) PER 5 UNITS: Performed by: INTERNAL MEDICINE

## 2021-09-27 PROCEDURE — 94799 UNLISTED PULMONARY SVC/PX: CPT

## 2021-09-27 PROCEDURE — 25010000002 DEXAMETHASONE PER 1 MG: Performed by: INTERNAL MEDICINE

## 2021-09-27 PROCEDURE — 82962 GLUCOSE BLOOD TEST: CPT

## 2021-09-27 PROCEDURE — 25010000002 ONDANSETRON PER 1 MG: Performed by: INTERNAL MEDICINE

## 2021-09-27 PROCEDURE — 82550 ASSAY OF CK (CPK): CPT | Performed by: INTERNAL MEDICINE

## 2021-09-27 PROCEDURE — 80053 COMPREHEN METABOLIC PANEL: CPT | Performed by: INTERNAL MEDICINE

## 2021-09-27 PROCEDURE — 25010000002 ENOXAPARIN PER 10 MG: Performed by: INTERNAL MEDICINE

## 2021-09-27 PROCEDURE — 86140 C-REACTIVE PROTEIN: CPT | Performed by: INTERNAL MEDICINE

## 2021-09-27 PROCEDURE — 63710000001 INSULIN GLARGINE PER 5 UNITS: Performed by: INTERNAL MEDICINE

## 2021-09-27 PROCEDURE — 82728 ASSAY OF FERRITIN: CPT | Performed by: INTERNAL MEDICINE

## 2021-09-27 PROCEDURE — 85027 COMPLETE CBC AUTOMATED: CPT | Performed by: INTERNAL MEDICINE

## 2021-09-27 RX ORDER — LEVOTHYROXINE SODIUM 20 UG/ML
16.25 INJECTION, SOLUTION INTRAVENOUS
Status: DISCONTINUED | OUTPATIENT
Start: 2021-09-28 | End: 2021-09-29

## 2021-09-27 RX ADMIN — INSULIN GLARGINE 10 UNITS: 100 INJECTION, SOLUTION SUBCUTANEOUS at 20:09

## 2021-09-27 RX ADMIN — PANTOPRAZOLE SODIUM 40 MG: 40 TABLET, DELAYED RELEASE ORAL at 06:38

## 2021-09-27 RX ADMIN — ALPRAZOLAM 0.25 MG: 0.25 TABLET ORAL at 16:11

## 2021-09-27 RX ADMIN — GABAPENTIN 200 MG: 100 CAPSULE ORAL at 18:03

## 2021-09-27 RX ADMIN — DEXAMETHASONE SODIUM PHOSPHATE 10 MG: 10 INJECTION INTRAMUSCULAR; INTRAVENOUS at 18:03

## 2021-09-27 RX ADMIN — BUDESONIDE AND FORMOTEROL FUMARATE DIHYDRATE 2 PUFF: 80; 4.5 AEROSOL RESPIRATORY (INHALATION) at 19:47

## 2021-09-27 RX ADMIN — DEXAMETHASONE SODIUM PHOSPHATE 10 MG: 10 INJECTION INTRAMUSCULAR; INTRAVENOUS at 06:38

## 2021-09-27 RX ADMIN — INSULIN GLARGINE 10 UNITS: 100 INJECTION, SOLUTION SUBCUTANEOUS at 08:54

## 2021-09-27 RX ADMIN — ENOXAPARIN SODIUM 40 MG: 40 INJECTION SUBCUTANEOUS at 06:38

## 2021-09-27 RX ADMIN — DEXTROMETHORPHAN POLISTIREX 60 MG: 30 SUSPENSION, EXTENDED RELEASE ORAL at 21:23

## 2021-09-27 RX ADMIN — BUDESONIDE AND FORMOTEROL FUMARATE DIHYDRATE 2 PUFF: 80; 4.5 AEROSOL RESPIRATORY (INHALATION) at 08:59

## 2021-09-27 RX ADMIN — ENOXAPARIN SODIUM 40 MG: 40 INJECTION SUBCUTANEOUS at 18:03

## 2021-09-27 RX ADMIN — INSULIN LISPRO 2 UNITS: 100 INJECTION, SOLUTION INTRAVENOUS; SUBCUTANEOUS at 08:54

## 2021-09-27 RX ADMIN — ALPRAZOLAM 0.25 MG: 0.25 TABLET ORAL at 06:03

## 2021-09-27 RX ADMIN — ONDANSETRON 4 MG: 2 INJECTION INTRAMUSCULAR; INTRAVENOUS at 16:11

## 2021-09-28 LAB
ALBUMIN SERPL-MCNC: 3.1 G/DL (ref 3.5–5.2)
ALBUMIN/GLOB SERPL: 1 G/DL
ALP SERPL-CCNC: 57 U/L (ref 39–117)
ALT SERPL W P-5'-P-CCNC: 20 U/L (ref 1–33)
ANION GAP SERPL CALCULATED.3IONS-SCNC: 9.1 MMOL/L (ref 5–15)
AST SERPL-CCNC: 22 U/L (ref 1–32)
BILIRUB SERPL-MCNC: 0.6 MG/DL (ref 0–1.2)
BUN SERPL-MCNC: 29 MG/DL (ref 8–23)
BUN/CREAT SERPL: 47.5 (ref 7–25)
CALCIUM SPEC-SCNC: 8.7 MG/DL (ref 8.6–10.5)
CHLORIDE SERPL-SCNC: 108 MMOL/L (ref 98–107)
CO2 SERPL-SCNC: 27.9 MMOL/L (ref 22–29)
CREAT SERPL-MCNC: 0.61 MG/DL (ref 0.57–1)
DEPRECATED RDW RBC AUTO: 42.5 FL (ref 37–54)
ERYTHROCYTE [DISTWIDTH] IN BLOOD BY AUTOMATED COUNT: 13.1 % (ref 12.3–15.4)
GFR SERPL CREATININE-BSD FRML MDRD: 120 ML/MIN/1.73
GLOBULIN UR ELPH-MCNC: 3 GM/DL
GLUCOSE BLDC GLUCOMTR-MCNC: 119 MG/DL (ref 70–130)
GLUCOSE BLDC GLUCOMTR-MCNC: 137 MG/DL (ref 70–130)
GLUCOSE BLDC GLUCOMTR-MCNC: 138 MG/DL (ref 70–130)
GLUCOSE BLDC GLUCOMTR-MCNC: 155 MG/DL (ref 70–130)
GLUCOSE BLDC GLUCOMTR-MCNC: 165 MG/DL (ref 70–130)
GLUCOSE SERPL-MCNC: 130 MG/DL (ref 65–99)
HCT VFR BLD AUTO: 37.7 % (ref 34–46.6)
HGB BLD-MCNC: 12.8 G/DL (ref 12–15.9)
MCH RBC QN AUTO: 29.9 PG (ref 26.6–33)
MCHC RBC AUTO-ENTMCNC: 34 G/DL (ref 31.5–35.7)
MCV RBC AUTO: 88.1 FL (ref 79–97)
PLATELET # BLD AUTO: 415 10*3/MM3 (ref 140–450)
PMV BLD AUTO: 9.5 FL (ref 6–12)
POTASSIUM SERPL-SCNC: 3.9 MMOL/L (ref 3.5–5.2)
PROCALCITONIN SERPL-MCNC: 0.07 NG/ML (ref 0–0.25)
PROT SERPL-MCNC: 6.1 G/DL (ref 6–8.5)
RBC # BLD AUTO: 4.28 10*6/MM3 (ref 3.77–5.28)
SODIUM SERPL-SCNC: 145 MMOL/L (ref 136–145)
WBC # BLD AUTO: 4.97 10*3/MM3 (ref 3.4–10.8)

## 2021-09-28 PROCEDURE — 82962 GLUCOSE BLOOD TEST: CPT

## 2021-09-28 PROCEDURE — 94660 CPAP INITIATION&MGMT: CPT

## 2021-09-28 PROCEDURE — 94799 UNLISTED PULMONARY SVC/PX: CPT

## 2021-09-28 PROCEDURE — 25010000002 ONDANSETRON PER 1 MG: Performed by: INTERNAL MEDICINE

## 2021-09-28 PROCEDURE — 63710000001 INSULIN GLARGINE PER 5 UNITS: Performed by: INTERNAL MEDICINE

## 2021-09-28 PROCEDURE — 80053 COMPREHEN METABOLIC PANEL: CPT | Performed by: INTERNAL MEDICINE

## 2021-09-28 PROCEDURE — 25010000002 DEXAMETHASONE PER 1 MG: Performed by: INTERNAL MEDICINE

## 2021-09-28 PROCEDURE — 84145 PROCALCITONIN (PCT): CPT | Performed by: INTERNAL MEDICINE

## 2021-09-28 PROCEDURE — 85027 COMPLETE CBC AUTOMATED: CPT | Performed by: INTERNAL MEDICINE

## 2021-09-28 PROCEDURE — 25010000002 ENOXAPARIN PER 10 MG: Performed by: INTERNAL MEDICINE

## 2021-09-28 RX ADMIN — BUDESONIDE AND FORMOTEROL FUMARATE DIHYDRATE 2 PUFF: 80; 4.5 AEROSOL RESPIRATORY (INHALATION) at 20:02

## 2021-09-28 RX ADMIN — ONDANSETRON 4 MG: 2 INJECTION INTRAMUSCULAR; INTRAVENOUS at 18:04

## 2021-09-28 RX ADMIN — INSULIN GLARGINE 10 UNITS: 100 INJECTION, SOLUTION SUBCUTANEOUS at 20:00

## 2021-09-28 RX ADMIN — INSULIN GLARGINE 10 UNITS: 100 INJECTION, SOLUTION SUBCUTANEOUS at 10:20

## 2021-09-28 RX ADMIN — ENOXAPARIN SODIUM 40 MG: 40 INJECTION SUBCUTANEOUS at 18:04

## 2021-09-28 RX ADMIN — LEVOTHYROXINE SODIUM 16.25 MCG: 20 INJECTION, SOLUTION INTRAVENOUS at 09:01

## 2021-09-28 RX ADMIN — DEXAMETHASONE SODIUM PHOSPHATE 10 MG: 10 INJECTION INTRAMUSCULAR; INTRAVENOUS at 05:42

## 2021-09-28 RX ADMIN — ENOXAPARIN SODIUM 40 MG: 40 INJECTION SUBCUTANEOUS at 09:01

## 2021-09-28 RX ADMIN — ALPRAZOLAM 0.25 MG: 0.25 TABLET ORAL at 20:00

## 2021-09-28 RX ADMIN — BUDESONIDE AND FORMOTEROL FUMARATE DIHYDRATE 2 PUFF: 80; 4.5 AEROSOL RESPIRATORY (INHALATION) at 08:08

## 2021-09-28 RX ADMIN — LOSARTAN POTASSIUM 50 MG: 50 TABLET, FILM COATED ORAL at 09:02

## 2021-09-28 RX ADMIN — DEXAMETHASONE SODIUM PHOSPHATE 10 MG: 10 INJECTION INTRAMUSCULAR; INTRAVENOUS at 17:56

## 2021-09-28 RX ADMIN — DEXTROMETHORPHAN POLISTIREX 60 MG: 30 SUSPENSION, EXTENDED RELEASE ORAL at 21:59

## 2021-09-29 LAB
ALBUMIN SERPL-MCNC: 3.5 G/DL (ref 3.5–5.2)
ALBUMIN/GLOB SERPL: 1.2 G/DL
ALP SERPL-CCNC: 64 U/L (ref 39–117)
ALT SERPL W P-5'-P-CCNC: 24 U/L (ref 1–33)
ANION GAP SERPL CALCULATED.3IONS-SCNC: 9.5 MMOL/L (ref 5–15)
ARTERIAL PATENCY WRIST A: POSITIVE
AST SERPL-CCNC: 33 U/L (ref 1–32)
ATMOSPHERIC PRESS: 753.4 MMHG
BACTERIA SPEC AEROBE CULT: NORMAL
BASE EXCESS BLDA CALC-SCNC: 3.4 MMOL/L (ref 0–2)
BDY SITE: ABNORMAL
BILIRUB SERPL-MCNC: 0.8 MG/DL (ref 0–1.2)
BUN SERPL-MCNC: 32 MG/DL (ref 8–23)
BUN/CREAT SERPL: 47.1 (ref 7–25)
CALCIUM SPEC-SCNC: 9 MG/DL (ref 8.6–10.5)
CHLORIDE SERPL-SCNC: 109 MMOL/L (ref 98–107)
CO2 SERPL-SCNC: 28.5 MMOL/L (ref 22–29)
CREAT SERPL-MCNC: 0.68 MG/DL (ref 0.57–1)
D DIMER PPP FEU-MCNC: 0.75 MCGFEU/ML (ref 0–0.49)
DEPRECATED RDW RBC AUTO: 43.5 FL (ref 37–54)
ERYTHROCYTE [DISTWIDTH] IN BLOOD BY AUTOMATED COUNT: 13.1 % (ref 12.3–15.4)
GFR SERPL CREATININE-BSD FRML MDRD: 106 ML/MIN/1.73
GLOBULIN UR ELPH-MCNC: 2.9 GM/DL
GLUCOSE BLDC GLUCOMTR-MCNC: 165 MG/DL (ref 70–130)
GLUCOSE BLDC GLUCOMTR-MCNC: 166 MG/DL (ref 70–130)
GLUCOSE BLDC GLUCOMTR-MCNC: 234 MG/DL (ref 70–130)
GLUCOSE BLDC GLUCOMTR-MCNC: 279 MG/DL (ref 70–130)
GLUCOSE SERPL-MCNC: 158 MG/DL (ref 65–99)
HCO3 BLDA-SCNC: 28.6 MMOL/L (ref 22–28)
HCT VFR BLD AUTO: 40.9 % (ref 34–46.6)
HGB BLD-MCNC: 13.5 G/DL (ref 12–15.9)
INHALED O2 CONCENTRATION: 80 %
MCH RBC QN AUTO: 29.6 PG (ref 26.6–33)
MCHC RBC AUTO-ENTMCNC: 33 G/DL (ref 31.5–35.7)
MCV RBC AUTO: 89.7 FL (ref 79–97)
MODALITY: ABNORMAL
O2 A-A PPRESDIFF RESPIRATORY: 0.1 MMHG
PCO2 BLDA: 44.4 MM HG (ref 35–45)
PH BLDA: 7.42 PH UNITS (ref 7.35–7.45)
PLATELET # BLD AUTO: 431 10*3/MM3 (ref 140–450)
PMV BLD AUTO: 9.9 FL (ref 6–12)
PO2 BLDA: 68.6 MM HG (ref 80–100)
POTASSIUM SERPL-SCNC: 4 MMOL/L (ref 3.5–5.2)
PROT SERPL-MCNC: 6.4 G/DL (ref 6–8.5)
RBC # BLD AUTO: 4.56 10*6/MM3 (ref 3.77–5.28)
SAO2 % BLDCOA: 93.6 % (ref 92–99)
SET MECH RESP RATE: 18
SODIUM SERPL-SCNC: 147 MMOL/L (ref 136–145)
TOTAL RATE: 22 BREATHS/MINUTE
VT ON VENT VENT: 575 ML
WBC # BLD AUTO: 5.78 10*3/MM3 (ref 3.4–10.8)

## 2021-09-29 PROCEDURE — 85379 FIBRIN DEGRADATION QUANT: CPT | Performed by: INTERNAL MEDICINE

## 2021-09-29 PROCEDURE — 85027 COMPLETE CBC AUTOMATED: CPT | Performed by: INTERNAL MEDICINE

## 2021-09-29 PROCEDURE — 63710000001 INSULIN GLARGINE PER 5 UNITS: Performed by: INTERNAL MEDICINE

## 2021-09-29 PROCEDURE — 94799 UNLISTED PULMONARY SVC/PX: CPT

## 2021-09-29 PROCEDURE — 25010000002 ENOXAPARIN PER 10 MG: Performed by: INTERNAL MEDICINE

## 2021-09-29 PROCEDURE — 36600 WITHDRAWAL OF ARTERIAL BLOOD: CPT

## 2021-09-29 PROCEDURE — 82803 BLOOD GASES ANY COMBINATION: CPT

## 2021-09-29 PROCEDURE — 94660 CPAP INITIATION&MGMT: CPT

## 2021-09-29 PROCEDURE — 80053 COMPREHEN METABOLIC PANEL: CPT | Performed by: INTERNAL MEDICINE

## 2021-09-29 PROCEDURE — 63710000001 INSULIN LISPRO (HUMAN) PER 5 UNITS: Performed by: INTERNAL MEDICINE

## 2021-09-29 PROCEDURE — 25010000002 DEXAMETHASONE PER 1 MG: Performed by: INTERNAL MEDICINE

## 2021-09-29 PROCEDURE — 82962 GLUCOSE BLOOD TEST: CPT

## 2021-09-29 RX ORDER — LEVOTHYROXINE AND LIOTHYRONINE 19; 4.5 UG/1; UG/1
30 TABLET ORAL
Status: DISCONTINUED | OUTPATIENT
Start: 2021-09-30 | End: 2021-10-09 | Stop reason: HOSPADM

## 2021-09-29 RX ORDER — ACETAMINOPHEN 325 MG/1
650 TABLET ORAL EVERY 4 HOURS PRN
Status: DISCONTINUED | OUTPATIENT
Start: 2021-09-29 | End: 2021-10-09 | Stop reason: HOSPADM

## 2021-09-29 RX ORDER — PANTOPRAZOLE SODIUM 40 MG/10ML
40 INJECTION, POWDER, LYOPHILIZED, FOR SOLUTION INTRAVENOUS
Status: DISCONTINUED | OUTPATIENT
Start: 2021-09-30 | End: 2021-09-29

## 2021-09-29 RX ORDER — UREA 10 %
3 LOTION (ML) TOPICAL NIGHTLY PRN
Status: DISCONTINUED | OUTPATIENT
Start: 2021-09-29 | End: 2021-10-09 | Stop reason: HOSPADM

## 2021-09-29 RX ORDER — DEXTROMETHORPHAN POLISTIREX 30 MG/5ML
10 SUSPENSION ORAL 2 TIMES DAILY
Status: DISCONTINUED | OUTPATIENT
Start: 2021-09-29 | End: 2021-09-29

## 2021-09-29 RX ORDER — LOSARTAN POTASSIUM 50 MG/1
50 TABLET ORAL
Status: DISCONTINUED | OUTPATIENT
Start: 2021-09-29 | End: 2021-10-05

## 2021-09-29 RX ORDER — GABAPENTIN 100 MG/1
200 CAPSULE ORAL EVERY EVENING
Status: DISCONTINUED | OUTPATIENT
Start: 2021-09-29 | End: 2021-10-07

## 2021-09-29 RX ORDER — ALPRAZOLAM 0.25 MG/1
0.25 TABLET ORAL EVERY 8 HOURS PRN
Status: ACTIVE | OUTPATIENT
Start: 2021-09-29 | End: 2021-09-30

## 2021-09-29 RX ORDER — INSULIN LISPRO 100 [IU]/ML
0-9 INJECTION, SOLUTION INTRAVENOUS; SUBCUTANEOUS EVERY 6 HOURS
Status: DISCONTINUED | OUTPATIENT
Start: 2021-09-29 | End: 2021-10-06

## 2021-09-29 RX ORDER — DEXTROMETHORPHAN POLISTIREX 30 MG/5ML
10 SUSPENSION ORAL 2 TIMES DAILY
Status: DISCONTINUED | OUTPATIENT
Start: 2021-09-29 | End: 2021-10-09 | Stop reason: HOSPADM

## 2021-09-29 RX ORDER — GABAPENTIN 100 MG/1
100 CAPSULE ORAL DAILY
Status: DISCONTINUED | OUTPATIENT
Start: 2021-09-29 | End: 2021-10-07

## 2021-09-29 RX ORDER — LANSOPRAZOLE
30 KIT EVERY MORNING
Status: DISCONTINUED | OUTPATIENT
Start: 2021-09-30 | End: 2021-10-09 | Stop reason: HOSPADM

## 2021-09-29 RX ADMIN — ENOXAPARIN SODIUM 40 MG: 40 INJECTION SUBCUTANEOUS at 18:12

## 2021-09-29 RX ADMIN — DEXAMETHASONE SODIUM PHOSPHATE 10 MG: 10 INJECTION INTRAMUSCULAR; INTRAVENOUS at 17:44

## 2021-09-29 RX ADMIN — LOSARTAN POTASSIUM 50 MG: 50 TABLET, FILM COATED ORAL at 09:44

## 2021-09-29 RX ADMIN — BUDESONIDE AND FORMOTEROL FUMARATE DIHYDRATE 2 PUFF: 80; 4.5 AEROSOL RESPIRATORY (INHALATION) at 06:56

## 2021-09-29 RX ADMIN — INSULIN GLARGINE 10 UNITS: 100 INJECTION, SOLUTION SUBCUTANEOUS at 09:44

## 2021-09-29 RX ADMIN — LEVOTHYROXINE SODIUM 16.25 MCG: 20 INJECTION, SOLUTION INTRAVENOUS at 10:55

## 2021-09-29 RX ADMIN — DEXMEDETOMIDINE HYDROCHLORIDE 0.1 MCG/KG/HR: 100 INJECTION, SOLUTION, CONCENTRATE INTRAVENOUS at 06:20

## 2021-09-29 RX ADMIN — DEXTROMETHORPHAN POLISTIREX 60 MG: 30 SUSPENSION, EXTENDED RELEASE ORAL at 21:15

## 2021-09-29 RX ADMIN — ENOXAPARIN SODIUM 40 MG: 40 INJECTION SUBCUTANEOUS at 06:23

## 2021-09-29 RX ADMIN — DEXAMETHASONE SODIUM PHOSPHATE 10 MG: 10 INJECTION INTRAMUSCULAR; INTRAVENOUS at 06:23

## 2021-09-29 RX ADMIN — INSULIN LISPRO 2 UNITS: 100 INJECTION, SOLUTION INTRAVENOUS; SUBCUTANEOUS at 08:10

## 2021-09-29 RX ADMIN — DEXMEDETOMIDINE HYDROCHLORIDE 0.2 MCG/KG/HR: 100 INJECTION, SOLUTION, CONCENTRATE INTRAVENOUS at 06:37

## 2021-09-29 RX ADMIN — INSULIN LISPRO 4 UNITS: 100 INJECTION, SOLUTION INTRAVENOUS; SUBCUTANEOUS at 18:12

## 2021-09-29 RX ADMIN — PANTOPRAZOLE SODIUM 40 MG: 40 TABLET, DELAYED RELEASE ORAL at 06:23

## 2021-09-29 RX ADMIN — GABAPENTIN 200 MG: 100 CAPSULE ORAL at 17:44

## 2021-09-29 RX ADMIN — BUDESONIDE AND FORMOTEROL FUMARATE DIHYDRATE 2 PUFF: 80; 4.5 AEROSOL RESPIRATORY (INHALATION) at 21:55

## 2021-09-29 RX ADMIN — INSULIN GLARGINE 10 UNITS: 100 INJECTION, SOLUTION SUBCUTANEOUS at 21:15

## 2021-09-29 RX ADMIN — DEXMEDETOMIDINE HYDROCHLORIDE 0.3 MCG/KG/HR: 100 INJECTION, SOLUTION, CONCENTRATE INTRAVENOUS at 19:55

## 2021-09-29 RX ADMIN — ALPRAZOLAM 0.25 MG: 0.25 TABLET ORAL at 03:14

## 2021-09-29 RX ADMIN — GABAPENTIN 100 MG: 100 CAPSULE ORAL at 09:44

## 2021-09-29 RX ADMIN — INSULIN LISPRO 6 UNITS: 100 INJECTION, SOLUTION INTRAVENOUS; SUBCUTANEOUS at 12:26

## 2021-09-29 RX ADMIN — DEXTROMETHORPHAN POLISTIREX 60 MG: 30 SUSPENSION, EXTENDED RELEASE ORAL at 09:44

## 2021-09-30 LAB
ALBUMIN SERPL-MCNC: 3.1 G/DL (ref 3.5–5.2)
ALBUMIN/GLOB SERPL: 1.1 G/DL
ALP SERPL-CCNC: 66 U/L (ref 39–117)
ALT SERPL W P-5'-P-CCNC: 26 U/L (ref 1–33)
ANION GAP SERPL CALCULATED.3IONS-SCNC: 8.1 MMOL/L (ref 5–15)
AST SERPL-CCNC: 22 U/L (ref 1–32)
BILIRUB SERPL-MCNC: 0.6 MG/DL (ref 0–1.2)
BUN SERPL-MCNC: 29 MG/DL (ref 8–23)
BUN/CREAT SERPL: 46 (ref 7–25)
CALCIUM SPEC-SCNC: 8.6 MG/DL (ref 8.6–10.5)
CHLORIDE SERPL-SCNC: 106 MMOL/L (ref 98–107)
CO2 SERPL-SCNC: 28.9 MMOL/L (ref 22–29)
CREAT SERPL-MCNC: 0.63 MG/DL (ref 0.57–1)
DEPRECATED RDW RBC AUTO: 41.6 FL (ref 37–54)
ERYTHROCYTE [DISTWIDTH] IN BLOOD BY AUTOMATED COUNT: 13 % (ref 12.3–15.4)
GFR SERPL CREATININE-BSD FRML MDRD: 116 ML/MIN/1.73
GLOBULIN UR ELPH-MCNC: 2.8 GM/DL
GLUCOSE BLDC GLUCOMTR-MCNC: 213 MG/DL (ref 70–130)
GLUCOSE BLDC GLUCOMTR-MCNC: 251 MG/DL (ref 70–130)
GLUCOSE BLDC GLUCOMTR-MCNC: 269 MG/DL (ref 70–130)
GLUCOSE BLDC GLUCOMTR-MCNC: 296 MG/DL (ref 70–130)
GLUCOSE SERPL-MCNC: 279 MG/DL (ref 65–99)
HCT VFR BLD AUTO: 37.9 % (ref 34–46.6)
HGB BLD-MCNC: 12.4 G/DL (ref 12–15.9)
MCH RBC QN AUTO: 29.2 PG (ref 26.6–33)
MCHC RBC AUTO-ENTMCNC: 32.7 G/DL (ref 31.5–35.7)
MCV RBC AUTO: 89.4 FL (ref 79–97)
PLATELET # BLD AUTO: 332 10*3/MM3 (ref 140–450)
PMV BLD AUTO: 10.1 FL (ref 6–12)
POTASSIUM SERPL-SCNC: 4.7 MMOL/L (ref 3.5–5.2)
PROCALCITONIN SERPL-MCNC: 0.05 NG/ML (ref 0–0.25)
PROT SERPL-MCNC: 5.9 G/DL (ref 6–8.5)
RBC # BLD AUTO: 4.24 10*6/MM3 (ref 3.77–5.28)
SODIUM SERPL-SCNC: 143 MMOL/L (ref 136–145)
WBC # BLD AUTO: 5.29 10*3/MM3 (ref 3.4–10.8)

## 2021-09-30 PROCEDURE — 94660 CPAP INITIATION&MGMT: CPT

## 2021-09-30 PROCEDURE — 94799 UNLISTED PULMONARY SVC/PX: CPT

## 2021-09-30 PROCEDURE — 84145 PROCALCITONIN (PCT): CPT | Performed by: INTERNAL MEDICINE

## 2021-09-30 PROCEDURE — 80053 COMPREHEN METABOLIC PANEL: CPT | Performed by: INTERNAL MEDICINE

## 2021-09-30 PROCEDURE — 63710000001 INSULIN GLARGINE PER 5 UNITS: Performed by: INTERNAL MEDICINE

## 2021-09-30 PROCEDURE — 85027 COMPLETE CBC AUTOMATED: CPT | Performed by: INTERNAL MEDICINE

## 2021-09-30 PROCEDURE — 25010000002 DEXAMETHASONE PER 1 MG: Performed by: INTERNAL MEDICINE

## 2021-09-30 PROCEDURE — 25010000002 ENOXAPARIN PER 10 MG: Performed by: INTERNAL MEDICINE

## 2021-09-30 PROCEDURE — 82962 GLUCOSE BLOOD TEST: CPT

## 2021-09-30 PROCEDURE — 63710000001 INSULIN LISPRO (HUMAN) PER 5 UNITS: Performed by: INTERNAL MEDICINE

## 2021-09-30 RX ORDER — INSULIN GLARGINE 100 [IU]/ML
15 INJECTION, SOLUTION SUBCUTANEOUS EVERY 12 HOURS SCHEDULED
Status: DISCONTINUED | OUTPATIENT
Start: 2021-09-30 | End: 2021-10-01

## 2021-09-30 RX ORDER — ONDANSETRON 2 MG/ML
4 INJECTION INTRAMUSCULAR; INTRAVENOUS EVERY 6 HOURS PRN
Status: DISCONTINUED | OUTPATIENT
Start: 2021-09-30 | End: 2021-10-09 | Stop reason: HOSPADM

## 2021-09-30 RX ORDER — SENNA PLUS 8.6 MG/1
1 TABLET ORAL 2 TIMES DAILY
Status: DISCONTINUED | OUTPATIENT
Start: 2021-09-30 | End: 2021-10-09 | Stop reason: HOSPADM

## 2021-09-30 RX ORDER — DEXAMETHASONE SODIUM PHOSPHATE 10 MG/ML
6 INJECTION INTRAMUSCULAR; INTRAVENOUS EVERY 12 HOURS
Status: DISCONTINUED | OUTPATIENT
Start: 2021-10-01 | End: 2021-10-03

## 2021-09-30 RX ORDER — ONDANSETRON 4 MG/1
4 TABLET, FILM COATED ORAL EVERY 6 HOURS PRN
Status: DISCONTINUED | OUTPATIENT
Start: 2021-09-30 | End: 2021-10-09 | Stop reason: HOSPADM

## 2021-09-30 RX ADMIN — BUDESONIDE AND FORMOTEROL FUMARATE DIHYDRATE 2 PUFF: 80; 4.5 AEROSOL RESPIRATORY (INHALATION) at 20:36

## 2021-09-30 RX ADMIN — DEXAMETHASONE SODIUM PHOSPHATE 10 MG: 10 INJECTION INTRAMUSCULAR; INTRAVENOUS at 18:44

## 2021-09-30 RX ADMIN — GABAPENTIN 100 MG: 100 CAPSULE ORAL at 10:11

## 2021-09-30 RX ADMIN — ENOXAPARIN SODIUM 40 MG: 40 INJECTION SUBCUTANEOUS at 06:21

## 2021-09-30 RX ADMIN — SENNOSIDES 1 TABLET: 8.6 TABLET, FILM COATED ORAL at 22:08

## 2021-09-30 RX ADMIN — DEXMEDETOMIDINE HYDROCHLORIDE 0.2 MCG/KG/HR: 100 INJECTION, SOLUTION, CONCENTRATE INTRAVENOUS at 16:30

## 2021-09-30 RX ADMIN — LANSOPRAZOLE 30 MG: KIT at 07:02

## 2021-09-30 RX ADMIN — DEXTROMETHORPHAN POLISTIREX 60 MG: 30 SUSPENSION, EXTENDED RELEASE ORAL at 10:18

## 2021-09-30 RX ADMIN — INSULIN LISPRO 6 UNITS: 100 INJECTION, SOLUTION INTRAVENOUS; SUBCUTANEOUS at 06:21

## 2021-09-30 RX ADMIN — INSULIN LISPRO 4 UNITS: 100 INJECTION, SOLUTION INTRAVENOUS; SUBCUTANEOUS at 12:48

## 2021-09-30 RX ADMIN — DEXTROMETHORPHAN POLISTIREX 60 MG: 30 SUSPENSION, EXTENDED RELEASE ORAL at 20:48

## 2021-09-30 RX ADMIN — BUDESONIDE AND FORMOTEROL FUMARATE DIHYDRATE 2 PUFF: 80; 4.5 AEROSOL RESPIRATORY (INHALATION) at 13:09

## 2021-09-30 RX ADMIN — GABAPENTIN 200 MG: 100 CAPSULE ORAL at 16:30

## 2021-09-30 RX ADMIN — INSULIN LISPRO 6 UNITS: 100 INJECTION, SOLUTION INTRAVENOUS; SUBCUTANEOUS at 00:43

## 2021-09-30 RX ADMIN — DEXAMETHASONE SODIUM PHOSPHATE 10 MG: 10 INJECTION INTRAMUSCULAR; INTRAVENOUS at 06:21

## 2021-09-30 RX ADMIN — INSULIN GLARGINE 10 UNITS: 100 INJECTION, SOLUTION SUBCUTANEOUS at 10:12

## 2021-09-30 RX ADMIN — SENNOSIDES 1 TABLET: 8.6 TABLET, FILM COATED ORAL at 16:30

## 2021-09-30 RX ADMIN — INSULIN GLARGINE 15 UNITS: 100 INJECTION, SOLUTION SUBCUTANEOUS at 20:48

## 2021-09-30 RX ADMIN — ENOXAPARIN SODIUM 40 MG: 40 INJECTION SUBCUTANEOUS at 18:44

## 2021-09-30 RX ADMIN — INSULIN LISPRO 6 UNITS: 100 INJECTION, SOLUTION INTRAVENOUS; SUBCUTANEOUS at 18:44

## 2021-09-30 RX ADMIN — LEVOTHYROXINE, LIOTHYRONINE 30 MG: 19; 4.5 TABLET ORAL at 06:20

## 2021-10-01 LAB
ALBUMIN SERPL-MCNC: 3.2 G/DL (ref 3.5–5.2)
ALBUMIN/GLOB SERPL: 1.3 G/DL
ALP SERPL-CCNC: 70 U/L (ref 39–117)
ALT SERPL W P-5'-P-CCNC: 26 U/L (ref 1–33)
ANION GAP SERPL CALCULATED.3IONS-SCNC: 8.1 MMOL/L (ref 5–15)
AST SERPL-CCNC: 21 U/L (ref 1–32)
BILIRUB SERPL-MCNC: 0.5 MG/DL (ref 0–1.2)
BUN SERPL-MCNC: 26 MG/DL (ref 8–23)
BUN/CREAT SERPL: 46.4 (ref 7–25)
CALCIUM SPEC-SCNC: 8.5 MG/DL (ref 8.6–10.5)
CHLORIDE SERPL-SCNC: 102 MMOL/L (ref 98–107)
CO2 SERPL-SCNC: 27.9 MMOL/L (ref 22–29)
CREAT SERPL-MCNC: 0.56 MG/DL (ref 0.57–1)
D DIMER PPP FEU-MCNC: 0.6 MCGFEU/ML (ref 0–0.49)
DEPRECATED RDW RBC AUTO: 41.2 FL (ref 37–54)
ERYTHROCYTE [DISTWIDTH] IN BLOOD BY AUTOMATED COUNT: 12.7 % (ref 12.3–15.4)
GFR SERPL CREATININE-BSD FRML MDRD: 133 ML/MIN/1.73
GLOBULIN UR ELPH-MCNC: 2.4 GM/DL
GLUCOSE BLDC GLUCOMTR-MCNC: 138 MG/DL (ref 70–130)
GLUCOSE BLDC GLUCOMTR-MCNC: 140 MG/DL (ref 70–130)
GLUCOSE BLDC GLUCOMTR-MCNC: 227 MG/DL (ref 70–130)
GLUCOSE BLDC GLUCOMTR-MCNC: 272 MG/DL (ref 70–130)
GLUCOSE BLDC GLUCOMTR-MCNC: 317 MG/DL (ref 70–130)
GLUCOSE SERPL-MCNC: 268 MG/DL (ref 65–99)
HCT VFR BLD AUTO: 38.3 % (ref 34–46.6)
HGB BLD-MCNC: 12.5 G/DL (ref 12–15.9)
MCH RBC QN AUTO: 29 PG (ref 26.6–33)
MCHC RBC AUTO-ENTMCNC: 32.6 G/DL (ref 31.5–35.7)
MCV RBC AUTO: 88.9 FL (ref 79–97)
PLATELET # BLD AUTO: 322 10*3/MM3 (ref 140–450)
PMV BLD AUTO: 10.3 FL (ref 6–12)
POTASSIUM SERPL-SCNC: 4.3 MMOL/L (ref 3.5–5.2)
PROT SERPL-MCNC: 5.6 G/DL (ref 6–8.5)
RBC # BLD AUTO: 4.31 10*6/MM3 (ref 3.77–5.28)
SODIUM SERPL-SCNC: 138 MMOL/L (ref 136–145)
WBC # BLD AUTO: 5.87 10*3/MM3 (ref 3.4–10.8)

## 2021-10-01 PROCEDURE — 94799 UNLISTED PULMONARY SVC/PX: CPT

## 2021-10-01 PROCEDURE — 63710000001 INSULIN LISPRO (HUMAN) PER 5 UNITS: Performed by: INTERNAL MEDICINE

## 2021-10-01 PROCEDURE — 63710000001 INSULIN GLARGINE PER 5 UNITS: Performed by: INTERNAL MEDICINE

## 2021-10-01 PROCEDURE — 94660 CPAP INITIATION&MGMT: CPT

## 2021-10-01 PROCEDURE — 85379 FIBRIN DEGRADATION QUANT: CPT | Performed by: INTERNAL MEDICINE

## 2021-10-01 PROCEDURE — 82962 GLUCOSE BLOOD TEST: CPT

## 2021-10-01 PROCEDURE — 94760 N-INVAS EAR/PLS OXIMETRY 1: CPT

## 2021-10-01 PROCEDURE — 25010000002 ENOXAPARIN PER 10 MG: Performed by: INTERNAL MEDICINE

## 2021-10-01 PROCEDURE — 85027 COMPLETE CBC AUTOMATED: CPT | Performed by: INTERNAL MEDICINE

## 2021-10-01 PROCEDURE — 80053 COMPREHEN METABOLIC PANEL: CPT | Performed by: INTERNAL MEDICINE

## 2021-10-01 PROCEDURE — 25010000002 DEXAMETHASONE PER 1 MG: Performed by: INTERNAL MEDICINE

## 2021-10-01 RX ORDER — ECHINACEA PURPUREA EXTRACT 125 MG
1 TABLET ORAL AS NEEDED
Status: DISCONTINUED | OUTPATIENT
Start: 2021-10-01 | End: 2021-10-09 | Stop reason: HOSPADM

## 2021-10-01 RX ORDER — INSULIN GLARGINE 100 [IU]/ML
20 INJECTION, SOLUTION SUBCUTANEOUS EVERY 12 HOURS SCHEDULED
Status: DISCONTINUED | OUTPATIENT
Start: 2021-10-01 | End: 2021-10-03

## 2021-10-01 RX ADMIN — ENOXAPARIN SODIUM 40 MG: 40 INJECTION SUBCUTANEOUS at 18:37

## 2021-10-01 RX ADMIN — BUDESONIDE AND FORMOTEROL FUMARATE DIHYDRATE 2 PUFF: 80; 4.5 AEROSOL RESPIRATORY (INHALATION) at 11:06

## 2021-10-01 RX ADMIN — DEXAMETHASONE SODIUM PHOSPHATE 6 MG: 10 INJECTION INTRAMUSCULAR; INTRAVENOUS at 18:37

## 2021-10-01 RX ADMIN — INSULIN LISPRO 4 UNITS: 100 INJECTION, SOLUTION INTRAVENOUS; SUBCUTANEOUS at 13:09

## 2021-10-01 RX ADMIN — SENNOSIDES 1 TABLET: 8.6 TABLET, FILM COATED ORAL at 08:25

## 2021-10-01 RX ADMIN — INSULIN GLARGINE 20 UNITS: 100 INJECTION, SOLUTION SUBCUTANEOUS at 20:02

## 2021-10-01 RX ADMIN — BUDESONIDE AND FORMOTEROL FUMARATE DIHYDRATE 2 PUFF: 80; 4.5 AEROSOL RESPIRATORY (INHALATION) at 20:36

## 2021-10-01 RX ADMIN — SENNOSIDES 1 TABLET: 8.6 TABLET, FILM COATED ORAL at 20:02

## 2021-10-01 RX ADMIN — GABAPENTIN 200 MG: 100 CAPSULE ORAL at 18:37

## 2021-10-01 RX ADMIN — DEXTROMETHORPHAN POLISTIREX 60 MG: 30 SUSPENSION, EXTENDED RELEASE ORAL at 20:02

## 2021-10-01 RX ADMIN — DEXTROMETHORPHAN POLISTIREX 60 MG: 30 SUSPENSION, EXTENDED RELEASE ORAL at 08:25

## 2021-10-01 RX ADMIN — ACETAMINOPHEN 650 MG: 325 TABLET, FILM COATED ORAL at 18:37

## 2021-10-01 RX ADMIN — INSULIN LISPRO 7 UNITS: 100 INJECTION, SOLUTION INTRAVENOUS; SUBCUTANEOUS at 01:00

## 2021-10-01 RX ADMIN — DEXMEDETOMIDINE HYDROCHLORIDE 0.3 MCG/KG/HR: 100 INJECTION, SOLUTION, CONCENTRATE INTRAVENOUS at 07:12

## 2021-10-01 RX ADMIN — LANSOPRAZOLE 30 MG: KIT at 06:25

## 2021-10-01 RX ADMIN — ENOXAPARIN SODIUM 40 MG: 40 INJECTION SUBCUTANEOUS at 06:26

## 2021-10-01 RX ADMIN — GABAPENTIN 100 MG: 100 CAPSULE ORAL at 08:25

## 2021-10-01 RX ADMIN — LOSARTAN POTASSIUM 50 MG: 50 TABLET, FILM COATED ORAL at 08:24

## 2021-10-01 RX ADMIN — ACETAMINOPHEN 650 MG: 325 TABLET, FILM COATED ORAL at 13:20

## 2021-10-01 RX ADMIN — INSULIN LISPRO 6 UNITS: 100 INJECTION, SOLUTION INTRAVENOUS; SUBCUTANEOUS at 06:25

## 2021-10-01 RX ADMIN — DEXAMETHASONE SODIUM PHOSPHATE 6 MG: 10 INJECTION INTRAMUSCULAR; INTRAVENOUS at 06:25

## 2021-10-01 RX ADMIN — INSULIN GLARGINE 15 UNITS: 100 INJECTION, SOLUTION SUBCUTANEOUS at 08:25

## 2021-10-01 RX ADMIN — LEVOTHYROXINE, LIOTHYRONINE 30 MG: 19; 4.5 TABLET ORAL at 06:30

## 2021-10-01 NOTE — NURSING NOTE
Patient remains in CCU on bipap and optiflow; currently on bipap. On low dose of precedex. Alert & oriented. Remains very fatigued. Adequate UOP. Patients daughter Jillian updated via phone.

## 2021-10-01 NOTE — PLAN OF CARE
Goal Outcome Evaluation:      Patient remains in CCU. On/off non-rebreather today. Opti-flow currently at 76% with patient oxygen saturations ranging from 92-96%. Patient ambulated to chair today and has remained since 1300. Incentive spirometer encouraged. Tube feedings continued r/t poor PO intake or effort by patient. Chair exercises encouraged. Patient encouraged to feed self ice chips to help regain strength and mobility. Plans to reevaluate swallowing in the morning. Continuing to monitor.

## 2021-10-01 NOTE — PROGRESS NOTES
"                                              LOS: 9 days   Patient Care Team:  Angie Olivarez MD as PCP - General (Internal Medicine)    Chief Complaint:  F/up respiratory failure, NIPPV, ICU care and medical problems listed below    Subjective   History  Hospitalized 9/22.  Tested positive for 9/18.  Transferred to ICU 9/23.    Interval History  Used NIPPV 18/500/5/70% overnight.    On that HFNC this morning requiring 60 L/min 100% FiO2.  Required sedation overnight and was continued this morning at 0.3 mics/KG/min.    On tube feeding at goals and tolerating well.  Has not had a BM.      REVIEW OF SYSTEMS:   CARDIOVASCULAR: No chest pain, chest pressure or chest discomfort. No palpitations or edema.   GASTROINTESTINAL: No nausea or vomiting.  No diarrhea  CONSTITUTIONAL: No fever or chills.     Ventilator/Non-Invasive Ventilation Settings (From admission, onward)     Start     Ordered    09/24/21 1143  NIPPV (CPAP or BIPAP)  Until Discontinued     Question Answer Comment   Indication: Acute Respiratory Failure    Type: BIPAP    NIPPV Mask Interface: Per Patient Preference    Titrate for SPO2 90%        09/24/21 1143                      Physical Exam:     Vital Signs  Temp:  [97 °F (36.1 °C)-97.6 °F (36.4 °C)] 97 °F (36.1 °C)  Heart Rate:  [51-66] 66  Resp:  [16-22] 18  BP: ()/(57-94) 91/63    Intake/Output Summary (Last 24 hours) at 10/1/2021 1758  Last data filed at 10/1/2021 1327  Gross per 24 hour   Intake 1655.5 ml   Output 1300 ml   Net 355.5 ml     Flowsheet Rows      First Filed Value   Admission Height  165.1 cm (65\") Documented at 09/22/2021 1624   Admission Weight  101 kg (222 lb) Documented at 09/22/2021 1624          PPE used per hospital policy    General Appearance:   Alert, cooperative, in no severe distress.  Looks ill.     ENMT:  Mouth not examined due to presence of mask.  External ears normal.   Eyes:  Pupils equal and reactive to light. EOMI   Neck:    Trachea midline. No " thyromegaly.   Lungs:    Coarse.  No wheezing.  No use of accessory muscles.  Less tachypneic than yesterday.    Heart:   Regular rhythm and normal rate, normal S1 and S2, no         murmur   Skin:   No rash   Abdomen:    Obese. Soft. No tenderness. No HSM.   Neuro:  Conscious, alert, oriented x3. Strength 5/5 in upper and lower  ext.  Slightly anxious   Extremities:  No cyanosis, clubbing or edema.  Warm extremities and well-perfused          Results Review:        Results from last 7 days   Lab Units 10/01/21  0513 09/30/21  0423 09/30/21  0423 09/29/21  0528 09/29/21  0528   SODIUM mmol/L 138  --  143  --  147*   POTASSIUM mmol/L 4.3  --  4.7  --  4.0   CHLORIDE mmol/L 102  --  106  --  109*   CO2 mmol/L 27.9  --  28.9  --  28.5   BUN mg/dL 26*  --  29*  --  32*   CREATININE mg/dL 0.56*  --  0.63  --  0.68   GLUCOSE mg/dL 268*   < > 279*   < > 158*   CALCIUM mg/dL 8.5*  --  8.6  --  9.0    < > = values in this interval not displayed.     Results from last 7 days   Lab Units 09/27/21  0401 09/26/21  0417 09/25/21  0346   CK TOTAL U/L 34 48 51     Results from last 7 days   Lab Units 10/01/21  0513 09/30/21 0423 09/29/21  0528   WBC 10*3/mm3 5.87 5.29 5.78   HEMOGLOBIN g/dL 12.5 12.4 13.5   HEMATOCRIT % 38.3 37.9 40.9   PLATELETS 10*3/mm3 322 332 431               I reviewed the patient's new clinical results.        Medication Review:   budesonide-formoterol, 2 puff, Inhalation, BID - RT  dexamethasone, 6 mg, Intravenous, Q12H  dextromethorphan polistirex ER, 10 mL, Nasogastric, BID  enoxaparin, 40 mg, Subcutaneous, Q12H  gabapentin, 100 mg, Nasogastric, Daily  gabapentin, 200 mg, Nasogastric, Q PM  insulin glargine, 15 Units, Subcutaneous, Q12H  insulin lispro, 0-9 Units, Subcutaneous, Q6H  lansoprazole, 30 mg, Oral, QAM  losartan, 50 mg, Nasogastric, Q24H  senna, 1 tablet, Oral, BID  Thyroid, 30 mg, Nasogastric, QAM AC        dexmedetomidine, 0.2-1.5 mcg/kg/hr, Last Rate: Stopped (10/01/21  6580)        Diagnostic imaging:  I personally and independently reviewed the following images:  CXR 9/23/2021 compared 9/22/2021: Worsening bilateral pulmonary opacities/infiltrates.      Assessment   1. Acute hypoxic respiratory failure, on NIPPV since 9/24  2. Bilateral COVID-19 pneumonia, s/p Tocilizumab 9/24  3. Steroid-induced hyperglycemia  4. Elevated inflammatory markers, secondary to viral illness  5. Low albumin  6. Low back pain  7. Hypernatremia    Pertinent medical problems:  · JENAE, on CPAP  · HTN  · Obesity, BMI 35      Plan     · H HFNC and NRB max settings.  Requiring NIPPV at night and PRN during the day.  Still needing a lot of oxygen support.  · Continue tube feeding but will attempt bedside swallow eval and see if she can tolerate oral diet today.  If tolerated well then we could discontinue the core track  · Reduce free water rate since hyponatremia has been corrected  · Decrease dexamethasone 6 mg twice daily.  CRP improving  · Increase Lantus to 20 units twice daily.  Continue insulin sliding scale.  · Continue Symbicort  · Senokot-S twice a day as needed for constipation  · Continue levothyroxine  · Lovenox high-dose prophylaxis  · Up to chair if tolerated  · I-S    Discussed with staff during the multidisciplinary round    Shaka Vigil MD  10/01/21  17:58 EDT      Time: Critical care 35 min      This note was dictated utilizing Dragon dictation

## 2021-10-02 ENCOUNTER — APPOINTMENT (OUTPATIENT)
Dept: GENERAL RADIOLOGY | Facility: HOSPITAL | Age: 61
End: 2021-10-02

## 2021-10-02 LAB
ALBUMIN SERPL-MCNC: 2.8 G/DL (ref 3.5–5.2)
ALBUMIN/GLOB SERPL: 1.2 G/DL
ALP SERPL-CCNC: 51 U/L (ref 39–117)
ALT SERPL W P-5'-P-CCNC: 33 U/L (ref 1–33)
ANION GAP SERPL CALCULATED.3IONS-SCNC: 6.3 MMOL/L (ref 5–15)
AST SERPL-CCNC: 21 U/L (ref 1–32)
BILIRUB SERPL-MCNC: 0.5 MG/DL (ref 0–1.2)
BUN SERPL-MCNC: 24 MG/DL (ref 8–23)
BUN/CREAT SERPL: 49 (ref 7–25)
CALCIUM SPEC-SCNC: 8.2 MG/DL (ref 8.6–10.5)
CHLORIDE SERPL-SCNC: 104 MMOL/L (ref 98–107)
CO2 SERPL-SCNC: 28.7 MMOL/L (ref 22–29)
CREAT SERPL-MCNC: 0.49 MG/DL (ref 0.57–1)
CRP SERPL-MCNC: <0.3 MG/DL (ref 0–0.5)
DEPRECATED RDW RBC AUTO: 43.4 FL (ref 37–54)
ERYTHROCYTE [DISTWIDTH] IN BLOOD BY AUTOMATED COUNT: 13.2 % (ref 12.3–15.4)
FERRITIN SERPL-MCNC: 850 NG/ML (ref 13–150)
GFR SERPL CREATININE-BSD FRML MDRD: >150 ML/MIN/1.73
GLOBULIN UR ELPH-MCNC: 2.4 GM/DL
GLUCOSE BLDC GLUCOMTR-MCNC: 185 MG/DL (ref 70–130)
GLUCOSE BLDC GLUCOMTR-MCNC: 193 MG/DL (ref 70–130)
GLUCOSE BLDC GLUCOMTR-MCNC: 227 MG/DL (ref 70–130)
GLUCOSE SERPL-MCNC: 177 MG/DL (ref 65–99)
HCT VFR BLD AUTO: 38.3 % (ref 34–46.6)
HGB BLD-MCNC: 12.4 G/DL (ref 12–15.9)
MCH RBC QN AUTO: 29.6 PG (ref 26.6–33)
MCHC RBC AUTO-ENTMCNC: 32.4 G/DL (ref 31.5–35.7)
MCV RBC AUTO: 91.4 FL (ref 79–97)
PLATELET # BLD AUTO: 270 10*3/MM3 (ref 140–450)
PMV BLD AUTO: 10.1 FL (ref 6–12)
POTASSIUM SERPL-SCNC: 4.2 MMOL/L (ref 3.5–5.2)
PROCALCITONIN SERPL-MCNC: 0.05 NG/ML (ref 0–0.25)
PROT SERPL-MCNC: 5.2 G/DL (ref 6–8.5)
RBC # BLD AUTO: 4.19 10*6/MM3 (ref 3.77–5.28)
SODIUM SERPL-SCNC: 139 MMOL/L (ref 136–145)
WBC # BLD AUTO: 6.28 10*3/MM3 (ref 3.4–10.8)

## 2021-10-02 PROCEDURE — 84145 PROCALCITONIN (PCT): CPT | Performed by: INTERNAL MEDICINE

## 2021-10-02 PROCEDURE — 94799 UNLISTED PULMONARY SVC/PX: CPT

## 2021-10-02 PROCEDURE — 63710000001 INSULIN GLARGINE PER 5 UNITS: Performed by: INTERNAL MEDICINE

## 2021-10-02 PROCEDURE — 63710000001 INSULIN LISPRO (HUMAN) PER 5 UNITS: Performed by: INTERNAL MEDICINE

## 2021-10-02 PROCEDURE — 82728 ASSAY OF FERRITIN: CPT | Performed by: INTERNAL MEDICINE

## 2021-10-02 PROCEDURE — 25010000002 DEXAMETHASONE PER 1 MG: Performed by: INTERNAL MEDICINE

## 2021-10-02 PROCEDURE — 25010000002 ENOXAPARIN PER 10 MG: Performed by: INTERNAL MEDICINE

## 2021-10-02 PROCEDURE — 80053 COMPREHEN METABOLIC PANEL: CPT | Performed by: INTERNAL MEDICINE

## 2021-10-02 PROCEDURE — 82962 GLUCOSE BLOOD TEST: CPT

## 2021-10-02 PROCEDURE — 92610 EVALUATE SWALLOWING FUNCTION: CPT

## 2021-10-02 PROCEDURE — 71045 X-RAY EXAM CHEST 1 VIEW: CPT

## 2021-10-02 PROCEDURE — 94640 AIRWAY INHALATION TREATMENT: CPT

## 2021-10-02 PROCEDURE — 25010000002 FUROSEMIDE PER 20 MG: Performed by: INTERNAL MEDICINE

## 2021-10-02 PROCEDURE — 94660 CPAP INITIATION&MGMT: CPT

## 2021-10-02 PROCEDURE — 85027 COMPLETE CBC AUTOMATED: CPT | Performed by: INTERNAL MEDICINE

## 2021-10-02 PROCEDURE — 86140 C-REACTIVE PROTEIN: CPT | Performed by: INTERNAL MEDICINE

## 2021-10-02 RX ORDER — POLYETHYLENE GLYCOL 3350 17 G/17G
17 POWDER, FOR SOLUTION ORAL DAILY
Status: DISCONTINUED | OUTPATIENT
Start: 2021-10-02 | End: 2021-10-09 | Stop reason: HOSPADM

## 2021-10-02 RX ORDER — IPRATROPIUM BROMIDE AND ALBUTEROL SULFATE 2.5; .5 MG/3ML; MG/3ML
3 SOLUTION RESPIRATORY (INHALATION)
Status: DISCONTINUED | OUTPATIENT
Start: 2021-10-02 | End: 2021-10-09 | Stop reason: HOSPADM

## 2021-10-02 RX ORDER — FUROSEMIDE 10 MG/ML
40 INJECTION INTRAMUSCULAR; INTRAVENOUS ONCE
Status: COMPLETED | OUTPATIENT
Start: 2021-10-02 | End: 2021-10-02

## 2021-10-02 RX ADMIN — INSULIN GLARGINE 20 UNITS: 100 INJECTION, SOLUTION SUBCUTANEOUS at 20:45

## 2021-10-02 RX ADMIN — DEXTROMETHORPHAN POLISTIREX 60 MG: 30 SUSPENSION, EXTENDED RELEASE ORAL at 09:26

## 2021-10-02 RX ADMIN — IPRATROPIUM BROMIDE AND ALBUTEROL SULFATE 3 ML: 2.5; .5 SOLUTION RESPIRATORY (INHALATION) at 20:08

## 2021-10-02 RX ADMIN — INSULIN LISPRO 4 UNITS: 100 INJECTION, SOLUTION INTRAVENOUS; SUBCUTANEOUS at 12:52

## 2021-10-02 RX ADMIN — SALINE NASAL SPRAY 1 SPRAY: 1.5 SOLUTION NASAL at 10:09

## 2021-10-02 RX ADMIN — GABAPENTIN 100 MG: 100 CAPSULE ORAL at 09:27

## 2021-10-02 RX ADMIN — FUROSEMIDE 40 MG: 10 INJECTION, SOLUTION INTRAMUSCULAR; INTRAVENOUS at 12:52

## 2021-10-02 RX ADMIN — LANSOPRAZOLE 30 MG: KIT at 06:33

## 2021-10-02 RX ADMIN — LEVOTHYROXINE, LIOTHYRONINE 30 MG: 19; 4.5 TABLET ORAL at 06:33

## 2021-10-02 RX ADMIN — DEXAMETHASONE SODIUM PHOSPHATE 6 MG: 10 INJECTION INTRAMUSCULAR; INTRAVENOUS at 06:34

## 2021-10-02 RX ADMIN — DEXTROMETHORPHAN POLISTIREX 60 MG: 30 SUSPENSION, EXTENDED RELEASE ORAL at 20:45

## 2021-10-02 RX ADMIN — ACETAMINOPHEN 650 MG: 325 TABLET, FILM COATED ORAL at 00:47

## 2021-10-02 RX ADMIN — POLYETHYLENE GLYCOL 3350 17 G: 17 POWDER, FOR SOLUTION ORAL at 13:42

## 2021-10-02 RX ADMIN — BUDESONIDE AND FORMOTEROL FUMARATE DIHYDRATE 2 PUFF: 80; 4.5 AEROSOL RESPIRATORY (INHALATION) at 08:53

## 2021-10-02 RX ADMIN — ENOXAPARIN SODIUM 40 MG: 40 INJECTION SUBCUTANEOUS at 06:33

## 2021-10-02 RX ADMIN — SENNOSIDES 1 TABLET: 8.6 TABLET, FILM COATED ORAL at 20:45

## 2021-10-02 RX ADMIN — INSULIN LISPRO 2 UNITS: 100 INJECTION, SOLUTION INTRAVENOUS; SUBCUTANEOUS at 18:16

## 2021-10-02 RX ADMIN — ENOXAPARIN SODIUM 40 MG: 40 INJECTION SUBCUTANEOUS at 18:16

## 2021-10-02 RX ADMIN — ACETAMINOPHEN 650 MG: 325 TABLET, FILM COATED ORAL at 09:53

## 2021-10-02 RX ADMIN — SENNOSIDES 1 TABLET: 8.6 TABLET, FILM COATED ORAL at 09:27

## 2021-10-02 RX ADMIN — INSULIN GLARGINE 20 UNITS: 100 INJECTION, SOLUTION SUBCUTANEOUS at 09:27

## 2021-10-02 RX ADMIN — DEXAMETHASONE SODIUM PHOSPHATE 6 MG: 10 INJECTION INTRAMUSCULAR; INTRAVENOUS at 18:16

## 2021-10-02 RX ADMIN — GABAPENTIN 200 MG: 100 CAPSULE ORAL at 18:16

## 2021-10-02 RX ADMIN — INSULIN LISPRO 2 UNITS: 100 INJECTION, SOLUTION INTRAVENOUS; SUBCUTANEOUS at 06:33

## 2021-10-02 NOTE — THERAPY EVALUATION
Acute Care - Speech Language Pathology   Swallow Initial Evaluation [facility]     Patient Name: Brodie Bueno  : 1960  MRN: [id number]  Today's Date: 10/2/2021               Admit Date: 2021    Visit Dx:     ICD-10-CM ICD-9-CM   1. Pneumonia due to COVID-19 virus  U07.1 480.8    J12.82 079.89   2. Hypoxia  R09.02 799.02   3. Hypokalemia  E87.6 276.8     Patient Active Problem List   Diagnosis   • Gastroesophageal reflux disease   • Hypertension   • JENAE (obstructive sleep apnea)   • BESS (dyspnea on exertion)   • Fatigue   • Clinical diagnosis of COVID-19   • Pneumonia due to COVID-19 virus   • Acute hypoxemic respiratory failure due to COVID-19 (HCC)   • Obesity (BMI 30-39.9)   • Type II diabetes mellitus, uncontrolled (HCC)     Past Medical History:   Diagnosis Date   • Cough    • Fibromyalgia    • Frequent urination    • Gallbladder disease    • GERD (gastroesophageal reflux disease)    • Hemorrhoids    • Hyperlipidemia    • Hypersomnolence    • Hypertension    • Nasal congestion    • JENAE (obstructive sleep apnea)     mild-severe   • Postnasal drip    • Sleep apnea    • Snoring    • Subcutaneous abscess    • Weight gain      Past Surgical History:   Procedure Laterality Date   • CHOLECYSTECTOMY     • COLONOSCOPY  approx     normal per patient   • LYMPH NODE BIOPSY     • UPPER GASTROINTESTINAL ENDOSCOPY  approx     gerd per patient   Patient was not wearing a face mask during this therapy encounter. Therapist used appropriate personal protective equipment including mask, eye protection and gloves.  Mask used was N95/duckbill. Appropriate PPE was worn during the entire therapy session. Hand hygiene was completed before and after therapy session. Patient is in enhanced droplet precautions.               SLP Recommendation and Plan  SLP Swallowing Diagnosis: mild-moderate, oral dysphagia, suspected pharyngeal dysphagia, R/O pharyngeal dysphagia, pharyngeal dysphagia (10/02/21 1600)  SLP  Diet Recommendation: mechanical soft with no mixed consistencies, nectar thick liquids (10/02/21 1600)  Recommended Precautions and Strategies: upright posture during/after eating, small bites of food and sips of liquid, no straw, voluntary breath hold technique, reflux precautions, assist with feeding (10/02/21 1600)  SLP Rec. for Method of Medication Administration: meds whole, meds crushed, with thick liquids, with pudding or applesauce (pudddiing not applesauce) (10/02/21 1600)     Monitor for Signs of Aspiration: yes, notify SLP if any concerns (10/02/21 1600)  Recommended Diagnostics: VFSS (MBS), FEES, reassess via clinical swallow evaluation (10/02/21 1600)  Swallow Criteria for Skilled Therapeutic Interventions Met: demonstrates skilled criteria (10/02/21 1600)  Anticipated Discharge Disposition (SLP): unknown (10/02/21 1600)  Rehab Potential/Prognosis, Swallowing: good, to achieve stated therapy goals (10/02/21 1600)  Therapy Frequency (Swallow): PRN (10/02/21 1600)  Predicted Duration Therapy Intervention (Days): until discharge (10/02/21 1600)                         Plan of Care Reviewed With: patient  Outcome Summary: Swallow assessed at bedside.   Recommend a mechanical soft diet with nectar thick liquids by cup and no straws.  Meds may be given whole or crushed with pudding not applesauce or with nectar thick liquids.  Swallow precautions include sit up at 90 degrees,small bites and sips, slow rate, and effortful breath swallow as well as reflux precautions. Pt may need set up and assistance with meals.  Will follow for diet tolerance, diet upgrade and/or downgraade and for need for FEES/VFSS.         SWALLOW EVALUATION (last 72 hours)      SLP Adult Swallow Evaluation     Row Name 10/02/21 1600                   Rehab Evaluation    Document Type  evaluation  -NR        Subjective Information  no complaints tired but able to participate  -NR        Patient Observations  cooperative;agree to therapy   -NR        Care Plan Review  evaluation/treatment results reviewed;care plan/treatment goals reviewed;current/potential barriers reviewed;patient/other agree to care plan  -NR        Patient Effort  good  -NR        Symptoms Noted During/After Treatment  none  -NR           General Information    Patient Profile Reviewed  yes  -NR        Pertinent History Of Current Problem  Pt admitted with PNA due to COVID.  CXR noted for patchy bilateral infiltrates.  Pt jomar 60% Optiflow and non invasive vent at night.  Pt with nasoduoenal TF.  Pt NPO.  -NR        Current Method of Nutrition  NPO;nasogastric feedings  -NR        Precautions/Limitations, Vision  WFL;for purposes of eval  -NR        Precautions/Limitations, Hearing  WFL;for purposes of eval  -NR        Prior Level of Function-Communication  WFL  -NR        Prior Level of Function-Swallowing  safe, efficient swallowing in all situations  -NR        Plans/Goals Discussed with  patient;agreed upon  -NR        Barriers to Rehab  medically complex  -NR        Patient's Goals for Discharge  return to PO diet  -NR           Pain    Additional Documentation  Pain Scale: Numbers Pre/Post-Treatment (Group)  -NR           Pain Scale: Numbers Pre/Post-Treatment    Pretreatment Pain Rating  0/10 - no pain  -NR        Posttreatment Pain Rating  0/10 - no pain  -NR           Oral Motor Structure and Function    Dentition Assessment  natural, present and adequate  -NR        Secretion Management  WNL/WFL  -NR        Mucosal Quality  dry;sticky  -NR        Volitional Swallow  weak  -NR           Oral Musculature and Cranial Nerve Assessment    Oral Motor General Assessment  WFL  -NR           General Eating/Swallowing Observations    Respiratory Support Currently in Use  other (see comments) 60% Optiflow,noninvasive vent atnight  -NR        Eating/Swallowing Skills  self-fed;fed by SLP  -NR        Positioning During Eating  upright 90 degree;upright in bed  -NR        Utensils Used   spoon;cup;straw  -NR        Consistencies Trialed  regular textures;chopped;mixed consistency;pureed;ice chips;thin liquids;nectar/syrup-thick liquids;honey-thick liquids  -NR        Pre SpO2 (%)  95  -NR        Post SpO2 (%)  95  -NR           Clinical Swallow Eval    Esophageal Phase  -- some belching noted during eval  -NR        Clinical Swallow Evaluation Summary  Pt with some difficulty coordiniating a breathhold swallow.  Overt s/s of aspiration noted with thin liquids via cup/straw, nectar thick liquids by straw and puree.  No other overt s/s of aspiration noted. THe oral stage of the swallow was functional with extra time.  No oral residuals noted.  Recommend a mechanical soft diet with no mixed consistencies and nectar thick liquids by cup only, no straws.  Meds may be given whole or crushed in pudding or NTL.  Swallow precautions include a breath hold swallow, sit up at 90 degrees,small bites and sips, slow rate, and no straws.  Will monitor diet tolerance and for need for possible upgrade/downgrade and/or VFSS/FEES.    -NR           Clinical Impression    SLP Swallowing Diagnosis  mild-moderate;oral dysphagia;suspected pharyngeal dysphagia;R/O pharyngeal dysphagia;pharyngeal dysphagia  -NR        Functional Impact  risk of aspiration/pneumonia  -NR        Rehab Potential/Prognosis, Swallowing  good, to achieve stated therapy goals  -NR        Swallow Criteria for Skilled Therapeutic Interventions Met  demonstrates skilled criteria  -NR           Recommendations    Therapy Frequency (Swallow)  PRN  -NR        Predicted Duration Therapy Intervention (Days)  until discharge  -NR        SLP Diet Recommendation  mechanical soft with no mixed consistencies;nectar thick liquids  -NR        Recommended Diagnostics  VFSS (MBS);FEES;reassess via clinical swallow evaluation  -NR        Recommended Precautions and Strategies  upright posture during/after eating;small bites of food and sips of liquid;no  straw;voluntary breath hold technique;reflux precautions;assist with feeding  -NR        Oral Care Recommendations  Oral Care before breakfast, after meals and PRN  -NR        SLP Rec. for Method of Medication Administration  meds whole;meds crushed;with thick liquids;with pudding or applesauce pudddiing not applesauce  -NR        Monitor for Signs of Aspiration  yes;notify SLP if any concerns  -NR        Anticipated Discharge Disposition (SLP)  unknown  -NR           Swallow Goals (SLP)    Oral Nutrition/Hydration Goal Selection (SLP)  oral nutrition/hydration, SLP goal 1;oral nutrition/hydration, SLP goal 2  -NR           Oral Nutrition/Hydration Goal 1 (SLP)    Oral Nutrition/Hydration Goal 1, SLP  Tolerate diet without overt/clinical s/s of aspiration  -NR        Time Frame (Oral Nutrition/Hydration Goal 1, SLP)  by discharge  -NR           Oral Nutrition/Hydration Goal 2 (SLP)    Oral Nutrition/Hydration Goal 2, SLP  Monitor for need for VFSS/FEES  -NR        Time Frame (Oral Nutrition/Hydration Goal 2, SLP)  by discharge  -NR          User Key  (r) = Recorded By, (t) = Taken By, (c) = Cosigned By    Initials Name Effective Dates    Amber Buagh MA,CCC-SLP 06/16/21 -           EDUCATION  The patient has been educated in the following areas:   Dysphagia (Swallowing Impairment).       SLP GOALS     Row Name 10/02/21 1600             Oral Nutrition/Hydration Goal 1 (SLP)    Oral Nutrition/Hydration Goal 1, SLP  Tolerate diet without overt/clinical s/s of aspiration  -NR      Time Frame (Oral Nutrition/Hydration Goal 1, SLP)  by discharge  -NR         Oral Nutrition/Hydration Goal 2 (SLP)    Oral Nutrition/Hydration Goal 2, SLP  Monitor for need for VFSS/FEES  -NR      Time Frame (Oral Nutrition/Hydration Goal 2, SLP)  by discharge  -NR        User Key  (r) = Recorded By, (t) = Taken By, (c) = Cosigned By    Initials Name Provider Type    Amber Baugh MA,CCC-SLP Speech and Language Pathologist            SLP Outcome Measures (last 72 hours)      SLP Outcome Measures     Row Name 10/02/21 1700             SLP Outcome Measures    Outcome Measure Used?  Adult NOMS  -NR         Adult FCM Scores    FCM Chosen  Swallowing  -NR      Swallowing FCM Score  4  -NR        User Key  (r) = Recorded By, (t) = Taken By, (c) = Cosigned By    Initials Name Effective Dates    NR Amber Brown MA,CCC-SLP 06/16/21 -            Time Calculation:   Time Calculation- SLP     Row Name 10/02/21 1706             Time Calculation- SLP    SLP Start Time  1545  -NR      SLP Stop Time  1700  -NR      SLP Time Calculation (min)  75 min  -NR        User Key  (r) = Recorded By, (t) = Taken By, (c) = Cosigned By    Initials Name Provider Type    NR Amber Brown MA,CCC-SLP Speech and Language Pathologist          Therapy Charges for Today     Code Description Service Date Service Provider Modifiers Qty    [id number] HC ST EVAL ORAL PHARYNG SWALLOW 5 10/2/2021 Amber Brown MA,CCC-SLP GN 1               Amber Brown MA,CCC-SLP  10/2/2021

## 2021-10-02 NOTE — PROGRESS NOTES
Jerry Matos MD                          [phone]      Patient ID:    Name:  Brodie Bueno    MRN:  [id number]    1960   60 y.o.  female            Patient Care Team:  Angie Olivarez MD as PCP - General (Internal Medicine)    CC/ Reason for visit: Acute respiratory failure, acute COVID-19 pneumonia, obesity    Subjective: Pt seen and examined this AM. No acute overnight events noted. Doing better.  On 60% OptiFlow.  Using noninvasive ventilator at night.  Sitting up in a chair.  Still getting tube feeds.  Some secretions.  No new fevers.  Tolerating current treatment plan.    ROS: Denies any subjective fevers, syncope or presyncopal events, new neurological deficits, nausea or vomiting currently    Objective     Vital Signs past 24hrs    BP range: BP: ()/(57-80) 107/64  Pulse range: Heart Rate:  [54-90] 64  Resp rate range: Resp:  [19-28] 19  Temp range: Temp (24hrs), Av.7 °F (35.9 °C), Min:96.1 °F (35.6 °C), Max:97.4 °F (36.3 °C)      Ventilator/Non-Invasive Ventilation Settings (From admission, onward)     Start     Ordered    21 1143  NIPPV (CPAP or BIPAP)  Until Discontinued     Question Answer Comment   Indication: Acute Respiratory Failure    Type: BIPAP    NIPPV Mask Interface: Per Patient Preference    Titrate for SPO2 90%        21 1143                Device (Oxygen Therapy): heated;humidified;high-flow nasal cannula       92.8 kg (204 lb 9.4 oz); Body mass index is 34.05 kg/m².      Intake/Output Summary (Last 24 hours) at 10/2/2021 1233  Last data filed at 10/2/2021 0931  Gross per 24 hour   Intake 1977.5 ml   Output 1300 ml   Net 677.5 ml       PHYSICAL EXAM   Constitutional: Middle aged obese -American female pt in chair, No acute respiratory distress, + accessory muscle use  Head: - NCAT  Eyes: No pallor.  Anicteric sclerae, EOMI.  ENMT:  Mallampati 4, no oral thrush. Moist MM.   NECK: Trachea midline, No  thyromegaly, no palpable cervical lymphadenopathy  Heart: RRR, no murmur. Trace pedal edema   Lungs: VALERIANO +, No wheezes/ crackles heard    Abdomen: Soft.  Obese no tenderness, guarding or rigidity. No palpable masses  Extremities: Extremities warm and well perfused. No cyanosis/ clubbing  Neuro: Conscious, answers appropriately, no gross focal neuro deficits  Psych: Mood and affect appropriate    PPE recommended per [facility] infectious disease Isolation protocol for the current clinical scenario(as mentioned below) was followed.     Scheduled meds:  budesonide-formoterol, 2 puff, Inhalation, BID - RT  dexamethasone, 6 mg, Intravenous, Q12H  dextromethorphan polistirex ER, 10 mL, Nasogastric, BID  enoxaparin, 40 mg, Subcutaneous, Q12H  gabapentin, 100 mg, Nasogastric, Daily  gabapentin, 200 mg, Nasogastric, Q PM  insulin glargine, 20 Units, Subcutaneous, Q12H  insulin lispro, 0-9 Units, Subcutaneous, Q6H  lansoprazole, 30 mg, Oral, QAM  losartan, 50 mg, Nasogastric, Q24H  senna, 1 tablet, Oral, BID  Thyroid, 30 mg, Nasogastric, QAM AC        IV meds:                      dexmedetomidine, 0.2-1.5 mcg/kg/hr, Last Rate: Stopped (10/02/21 0430)        Data Review:      Results from last 7 days   Lab Units 10/02/21  0351 10/01/21  0513 09/30/21  0423 09/29/21  0528 09/28/21  0433   SODIUM mmol/L 139 138 143   < > 145   POTASSIUM mmol/L 4.2 4.3 4.7   < > 3.9   CHLORIDE mmol/L 104 102 106   < > 108*   CO2 mmol/L 28.7 27.9 28.9   < > 27.9   BUN mg/dL 24* 26* 29*   < > 29*   CREATININE mg/dL 0.49* 0.56* 0.63   < > 0.61   CALCIUM mg/dL 8.2* 8.5* 8.6   < > 8.7   BILIRUBIN mg/dL 0.5 0.5 0.6   < > 0.6   ALK PHOS U/L 51 70 66   < > 57   ALT (SGPT) U/L 33 26 26   < > 20   AST (SGOT) U/L 21 21 22   < > 22   GLUCOSE mg/dL 177* 268* 279*   < > 130*   WBC 10*3/mm3 6.28 5.87 5.29   < >  --    HEMOGLOBIN g/dL 12.4 12.5 12.4   < >  --    PLATELETS 10*3/mm3 270 322 332   < >  --    PROCALCITONIN ng/mL 0.05  --  0.05  --  0.07    < >  = values in this interval not displayed.       Lab Results   Component Value Date    CALCIUM 8.2 (L) 10/02/2021             Results from last 7 days   Lab Units 09/29/21  0536   PH, ARTERIAL pH units 7.417   PO2 ART mm Hg 68.6*   PCO2, ARTERIAL mm Hg 44.4   HCO3 ART mmol/L 28.6*      COVID LABS:  Results From Last 14 Days   Lab Units 10/02/21  0351 10/01/21  0513 09/30/21  0423 09/29/21  0528 09/28/21  0433 09/27/21  0401 09/26/21  0417 09/26/21  0417 09/25/21  0608 09/25/21  0346 09/24/21  0537 09/23/21  0442 09/22/21  1538   PROBNP pg/mL  --   --   --   --   --   --   --   --   --   --   --   --  103.7   CK TOTAL U/L  --   --   --   --   --  34  --  48  --  51   < > 153  --    CRP mg/dL  --   --   --   --   --  3.12*  --  4.89*  --  9.80*   < > 24.67*  --    D DIMER QUANT MCGFEU/mL  --  0.60*  --  0.75*  --  0.61*  --   --    < >  --   --  1.66*  --    FERRITIN ng/mL  --   --   --   --   --  1,056.00*  --  1,212.00*  --  1,557.00*   < > 1,121.00*  --    LACTATE mmol/L  --   --   --   --   --   --   --   --   --   --   --   --  1.4   LDH U/L  --   --   --   --   --   --   --   --   --   --   --  517*  --    PROCALCITONIN ng/mL 0.05  --  0.05  --  0.07  --    < > 0.09  --   --    < >  --  0.16   PROTIME Seconds  --   --   --   --   --   --   --   --   --   --   --   --  14.9*   INR   --   --   --   --   --   --   --   --   --   --   --   --  1.19*   TROPONIN T ng/mL  --   --   --   --   --   --   --   --   --   --   --   --  <0.010    < > = values in this interval not displayed.         Results Review:    I have reviewed the relevant laboratory results and independently reviewed the chest imaging from this hospitalization including the available echocardiogram reports personally and summarized it if/ when appropriate below    Assessment    Acute hypoxic respiratory failure  Noninvasive ventilator dependent -9/24  Acute Covid pneumonia  Elevated inflammatory markers s/p Actemra-9/24  Hypernatremia  JENAE on  CPAP  Morbid obesity    PLAN:  Patient is doing better with regards to her respiratory failure and not requiring noninvasive ventilator anymore consistently.  Able to take breaks and use it at night.  Currently on heated high flow nasal cannula at 60%.  We will repeat chest x-ray.  Continue with high-dose steroids for COVID-19 pneumonia.  Continue trend inflammatory markers.  We will add CRP and ferritin.  No current concern for secondary pneumonia.  Hyponatremia is improved.  Continue with free water.  Speech therapy evaluation to advance diet  Continue with nocturnal BiPAP to help with work of breathing as well as sleep apnea  Will use as needed diuretics to keep her as negative as possible  DVT prophylaxis  Out of bed    We will transfer the patient to regular floor.  We will continue to follow for pulmonary issues and defer management of medical issues to primary    Jerry Matos MD  10/2/2021

## 2021-10-02 NOTE — PLAN OF CARE
Problem: Adult Inpatient Plan of Care  Goal: Plan of Care Review  Flowsheets (Taken 10/2/2021 1700)  Plan of Care Reviewed With: patient  Outcome Summary: Swallow assessed at bedside.   Recommend a mechanical soft diet with nectar thick liquids by cup and no straws.  Meds may be given whole or crushed with pudding not applesauce or with nectar thick liquids.  Swallow precautions include sit up at 90 degrees,small bites and sips, slow rate, and effortful breath swallow as well as reflux precautions. Pt may need set up and assistance with meals.  Will follow for diet tolerance, diet upgrade and/or downgraade and for need for FEES/VFSS.   Goal Outcome Evaluation:  Plan of Care Reviewed With: patient           Outcome Summary: Swallow assessed at bedside.   Recommend a mechanical soft diet with nectar thick liquids by cup and no straws.  Meds may be given whole or crushed with pudding not applesauce or with nectar thick liquids.  Swallow precautions include sit up at 90 degrees,small bites and sips, slow rate, and effortful breath swallow as well as reflux precautions. Pt may need set up and assistance with meals.  Will follow for diet tolerance, diet upgrade and/or downgraade and for need for FEES/VFSS.

## 2021-10-03 PROBLEM — E03.9 HYPOTHYROIDISM (ACQUIRED): Status: ACTIVE | Noted: 2021-10-03

## 2021-10-03 LAB
ALBUMIN SERPL-MCNC: 3.2 G/DL (ref 3.5–5.2)
ALBUMIN/GLOB SERPL: 1.4 G/DL
ALP SERPL-CCNC: 59 U/L (ref 39–117)
ALT SERPL W P-5'-P-CCNC: 36 U/L (ref 1–33)
ANION GAP SERPL CALCULATED.3IONS-SCNC: 9.4 MMOL/L (ref 5–15)
AST SERPL-CCNC: 26 U/L (ref 1–32)
BILIRUB SERPL-MCNC: 0.5 MG/DL (ref 0–1.2)
BUN SERPL-MCNC: 23 MG/DL (ref 8–23)
BUN/CREAT SERPL: 39 (ref 7–25)
CALCIUM SPEC-SCNC: 8.8 MG/DL (ref 8.6–10.5)
CHLORIDE SERPL-SCNC: 99 MMOL/L (ref 98–107)
CO2 SERPL-SCNC: 29.6 MMOL/L (ref 22–29)
CREAT SERPL-MCNC: 0.59 MG/DL (ref 0.57–1)
CRP SERPL-MCNC: <0.3 MG/DL (ref 0–0.5)
D DIMER PPP FEU-MCNC: 1.06 MCGFEU/ML (ref 0–0.49)
DEPRECATED RDW RBC AUTO: 42.5 FL (ref 37–54)
ERYTHROCYTE [DISTWIDTH] IN BLOOD BY AUTOMATED COUNT: 13.1 % (ref 12.3–15.4)
FERRITIN SERPL-MCNC: 725 NG/ML (ref 13–150)
GFR SERPL CREATININE-BSD FRML MDRD: 125 ML/MIN/1.73
GLOBULIN UR ELPH-MCNC: 2.3 GM/DL
GLUCOSE BLDC GLUCOMTR-MCNC: 118 MG/DL (ref 70–130)
GLUCOSE BLDC GLUCOMTR-MCNC: 119 MG/DL (ref 70–130)
GLUCOSE BLDC GLUCOMTR-MCNC: 138 MG/DL (ref 70–130)
GLUCOSE BLDC GLUCOMTR-MCNC: 238 MG/DL (ref 70–130)
GLUCOSE BLDC GLUCOMTR-MCNC: 256 MG/DL (ref 70–130)
GLUCOSE SERPL-MCNC: 236 MG/DL (ref 65–99)
HCT VFR BLD AUTO: 39.2 % (ref 34–46.6)
HGB BLD-MCNC: 13 G/DL (ref 12–15.9)
MCH RBC QN AUTO: 29.8 PG (ref 26.6–33)
MCHC RBC AUTO-ENTMCNC: 33.2 G/DL (ref 31.5–35.7)
MCV RBC AUTO: 89.9 FL (ref 79–97)
PLATELET # BLD AUTO: 262 10*3/MM3 (ref 140–450)
PMV BLD AUTO: 10.4 FL (ref 6–12)
POTASSIUM SERPL-SCNC: 4.2 MMOL/L (ref 3.5–5.2)
PROT SERPL-MCNC: 5.5 G/DL (ref 6–8.5)
RBC # BLD AUTO: 4.36 10*6/MM3 (ref 3.77–5.28)
SODIUM SERPL-SCNC: 138 MMOL/L (ref 136–145)
WBC # BLD AUTO: 7.36 10*3/MM3 (ref 3.4–10.8)

## 2021-10-03 PROCEDURE — 94799 UNLISTED PULMONARY SVC/PX: CPT

## 2021-10-03 PROCEDURE — 82728 ASSAY OF FERRITIN: CPT | Performed by: INTERNAL MEDICINE

## 2021-10-03 PROCEDURE — 94660 CPAP INITIATION&MGMT: CPT

## 2021-10-03 PROCEDURE — 94760 N-INVAS EAR/PLS OXIMETRY 1: CPT

## 2021-10-03 PROCEDURE — 82962 GLUCOSE BLOOD TEST: CPT

## 2021-10-03 PROCEDURE — 63710000001 INSULIN GLARGINE PER 5 UNITS: Performed by: INTERNAL MEDICINE

## 2021-10-03 PROCEDURE — 63710000001 INSULIN LISPRO (HUMAN) PER 5 UNITS: Performed by: INTERNAL MEDICINE

## 2021-10-03 PROCEDURE — 80053 COMPREHEN METABOLIC PANEL: CPT | Performed by: INTERNAL MEDICINE

## 2021-10-03 PROCEDURE — 86140 C-REACTIVE PROTEIN: CPT | Performed by: INTERNAL MEDICINE

## 2021-10-03 PROCEDURE — 85379 FIBRIN DEGRADATION QUANT: CPT | Performed by: INTERNAL MEDICINE

## 2021-10-03 PROCEDURE — 36415 COLL VENOUS BLD VENIPUNCTURE: CPT | Performed by: INTERNAL MEDICINE

## 2021-10-03 PROCEDURE — 25010000002 DEXAMETHASONE PER 1 MG: Performed by: INTERNAL MEDICINE

## 2021-10-03 PROCEDURE — 25010000002 ENOXAPARIN PER 10 MG: Performed by: INTERNAL MEDICINE

## 2021-10-03 PROCEDURE — 85027 COMPLETE CBC AUTOMATED: CPT | Performed by: INTERNAL MEDICINE

## 2021-10-03 RX ORDER — DEXAMETHASONE SODIUM PHOSPHATE 10 MG/ML
6 INJECTION INTRAMUSCULAR; INTRAVENOUS DAILY
Status: COMPLETED | OUTPATIENT
Start: 2021-10-04 | End: 2021-10-08

## 2021-10-03 RX ORDER — CETIRIZINE HYDROCHLORIDE 10 MG/1
10 TABLET ORAL DAILY
Status: DISCONTINUED | OUTPATIENT
Start: 2021-10-03 | End: 2021-10-09 | Stop reason: HOSPADM

## 2021-10-03 RX ORDER — INSULIN GLARGINE 100 [IU]/ML
10 INJECTION, SOLUTION SUBCUTANEOUS EVERY 12 HOURS SCHEDULED
Status: DISCONTINUED | OUTPATIENT
Start: 2021-10-03 | End: 2021-10-05

## 2021-10-03 RX ORDER — OXYMETAZOLINE HYDROCHLORIDE 0.05 G/100ML
2 SPRAY NASAL 2 TIMES DAILY
Status: COMPLETED | OUTPATIENT
Start: 2021-10-03 | End: 2021-10-06

## 2021-10-03 RX ADMIN — POLYETHYLENE GLYCOL 3350 17 G: 17 POWDER, FOR SOLUTION ORAL at 08:36

## 2021-10-03 RX ADMIN — ENOXAPARIN SODIUM 40 MG: 40 INJECTION SUBCUTANEOUS at 06:07

## 2021-10-03 RX ADMIN — SENNOSIDES 1 TABLET: 8.6 TABLET, FILM COATED ORAL at 20:27

## 2021-10-03 RX ADMIN — INSULIN GLARGINE 10 UNITS: 100 INJECTION, SOLUTION SUBCUTANEOUS at 20:28

## 2021-10-03 RX ADMIN — DEXTROMETHORPHAN POLISTIREX 60 MG: 30 SUSPENSION, EXTENDED RELEASE ORAL at 20:27

## 2021-10-03 RX ADMIN — IPRATROPIUM BROMIDE AND ALBUTEROL SULFATE 3 ML: 2.5; .5 SOLUTION RESPIRATORY (INHALATION) at 20:38

## 2021-10-03 RX ADMIN — OXYMETAZOLINE HCL 2 SPRAY: 0.05 SPRAY NASAL at 20:28

## 2021-10-03 RX ADMIN — SENNOSIDES 1 TABLET: 8.6 TABLET, FILM COATED ORAL at 08:36

## 2021-10-03 RX ADMIN — INSULIN LISPRO 4 UNITS: 100 INJECTION, SOLUTION INTRAVENOUS; SUBCUTANEOUS at 00:44

## 2021-10-03 RX ADMIN — DEXAMETHASONE SODIUM PHOSPHATE 6 MG: 10 INJECTION INTRAMUSCULAR; INTRAVENOUS at 06:07

## 2021-10-03 RX ADMIN — DEXTROMETHORPHAN POLISTIREX 60 MG: 30 SUSPENSION, EXTENDED RELEASE ORAL at 08:35

## 2021-10-03 RX ADMIN — GABAPENTIN 200 MG: 100 CAPSULE ORAL at 17:39

## 2021-10-03 RX ADMIN — ACETAMINOPHEN 650 MG: 325 TABLET, FILM COATED ORAL at 17:42

## 2021-10-03 RX ADMIN — IPRATROPIUM BROMIDE AND ALBUTEROL SULFATE 3 ML: 2.5; .5 SOLUTION RESPIRATORY (INHALATION) at 07:25

## 2021-10-03 RX ADMIN — INSULIN GLARGINE 20 UNITS: 100 INJECTION, SOLUTION SUBCUTANEOUS at 08:54

## 2021-10-03 RX ADMIN — INSULIN LISPRO 6 UNITS: 100 INJECTION, SOLUTION INTRAVENOUS; SUBCUTANEOUS at 12:07

## 2021-10-03 RX ADMIN — LEVOTHYROXINE, LIOTHYRONINE 30 MG: 19; 4.5 TABLET ORAL at 06:07

## 2021-10-03 RX ADMIN — CETIRIZINE HYDROCHLORIDE 10 MG: 10 TABLET ORAL at 20:28

## 2021-10-03 RX ADMIN — IPRATROPIUM BROMIDE AND ALBUTEROL SULFATE 3 ML: 2.5; .5 SOLUTION RESPIRATORY (INHALATION) at 15:17

## 2021-10-03 RX ADMIN — ACETAMINOPHEN 650 MG: 325 TABLET, FILM COATED ORAL at 04:27

## 2021-10-03 RX ADMIN — IPRATROPIUM BROMIDE AND ALBUTEROL SULFATE 3 ML: 2.5; .5 SOLUTION RESPIRATORY (INHALATION) at 11:28

## 2021-10-03 RX ADMIN — GABAPENTIN 100 MG: 100 CAPSULE ORAL at 08:35

## 2021-10-03 RX ADMIN — ENOXAPARIN SODIUM 40 MG: 40 INJECTION SUBCUTANEOUS at 17:39

## 2021-10-03 NOTE — PROGRESS NOTES
Jerry Matos MD                          [phone]      Patient ID:    Name:  Brodie Bueno    MRN:  [id number]    1960   60 y.o.  female            Patient Care Team:  Angie Olivarez MD as PCP - General (Internal Medicine)    CC/ Reason for visit: Acute respiratory failure, acute COVID-19 pneumonia, obesity    Subjective: Pt seen and examined this AM. No acute overnight events noted. Doing better.  On 60% OptiFlow.  States that she is feeling better from breathing standpoint but having some headache and sinus pain.  Wondering if she can get off high flow nasal cannula    ROS: Denies any subjective fevers, syncope or presyncopal events, new neurological deficits, nausea or vomiting currently    Objective     Vital Signs past 24hrs    BP range: BP: ()/(64-68) 94/67  Pulse range: Heart Rate:  [75-95] 92  Resp rate range: Resp:  [17-22] 18  Temp range: Temp (24hrs), Av.9 °F (36.1 °C), Min:96 °F (35.6 °C), Max:98.7 °F (37.1 °C)      Ventilator/Non-Invasive Ventilation Settings (From admission, onward)     Start     Ordered    21 1143  NIPPV (CPAP or BIPAP)  Until Discontinued     Question Answer Comment   Indication: Acute Respiratory Failure    Type: BIPAP    NIPPV Mask Interface: Per Patient Preference    Titrate for SPO2 90%        21 1143                Device (Oxygen Therapy): heated;high-flow nasal cannula;humidified       92.8 kg (204 lb 9.4 oz); Body mass index is 34.05 kg/m².      Intake/Output Summary (Last 24 hours) at 10/3/2021 1433  Last data filed at 10/3/2021 1423  Gross per 24 hour   Intake 1054 ml   Output 1800 ml   Net -746 ml       PHYSICAL EXAM   Constitutional: Middle aged obese -American female pt in chair, No acute respiratory distress, + accessory muscle use  Head: - NCAT  Eyes: No pallor.  Anicteric sclerae, EOMI.  ENMT:  Mallampati 4, no oral thrush. Moist MM.   NECK: Trachea midline, No thyromegaly, no  palpable cervical lymphadenopathy  Heart: RRR, no murmur. Trace pedal edema   Lungs: VALERIANO +, No wheezes/ crackles heard    Abdomen: Soft.  Obese no tenderness, guarding or rigidity. No palpable masses  Extremities: Extremities warm and well perfused. No cyanosis/ clubbing  Neuro: Conscious, answers appropriately, no gross focal neuro deficits  Psych: Mood and affect appropriate    PPE recommended per [facility] infectious disease Isolation protocol for the current clinical scenario(as mentioned below) was followed.     Scheduled meds:  dexamethasone, 6 mg, Intravenous, Q12H  dextromethorphan polistirex ER, 10 mL, Nasogastric, BID  enoxaparin, 40 mg, Subcutaneous, Q12H  gabapentin, 100 mg, Nasogastric, Daily  gabapentin, 200 mg, Nasogastric, Q PM  insulin glargine, 20 Units, Subcutaneous, Q12H  insulin lispro, 0-9 Units, Subcutaneous, Q6H  ipratropium-albuterol, 3 mL, Nebulization, 4x Daily - RT  lansoprazole, 30 mg, Oral, QAM  losartan, 50 mg, Nasogastric, Q24H  polyethylene glycol, 17 g, Oral, Daily  senna, 1 tablet, Oral, BID  Thyroid, 30 mg, Nasogastric, QAM AC        IV meds:                           Data Review:      Results from last 7 days   Lab Units 10/03/21  0955 10/02/21  0351 10/01/21  0513 09/30/21  0423 09/30/21  0423 09/29/21  0528 09/28/21  0433   SODIUM mmol/L 138 139 138   < > 143   < > 145   POTASSIUM mmol/L 4.2 4.2 4.3   < > 4.7   < > 3.9   CHLORIDE mmol/L 99 104 102   < > 106   < > 108*   CO2 mmol/L 29.6* 28.7 27.9   < > 28.9   < > 27.9   BUN mg/dL 23 24* 26*   < > 29*   < > 29*   CREATININE mg/dL 0.59 0.49* 0.56*   < > 0.63   < > 0.61   CALCIUM mg/dL 8.8 8.2* 8.5*   < > 8.6   < > 8.7   BILIRUBIN mg/dL 0.5 0.5 0.5   < > 0.6   < > 0.6   ALK PHOS U/L 59 51 70   < > 66   < > 57   ALT (SGPT) U/L 36* 33 26   < > 26   < > 20   AST (SGOT) U/L 26 21 21   < > 22   < > 22   GLUCOSE mg/dL 236* 177* 268*   < > 279*   < > 130*   WBC 10*3/mm3 7.36 6.28 5.87   < > 5.29   < >  --    HEMOGLOBIN g/dL 13.0  12.4 12.5   < > 12.4   < >  --    PLATELETS 10*3/mm3 262 270 322   < > 332   < >  --    PROCALCITONIN ng/mL  --  0.05  --   --  0.05  --  0.07    < > = values in this interval not displayed.       Lab Results   Component Value Date    CALCIUM 8.8 10/03/2021             Results from last 7 days   Lab Units 09/29/21  0536   PH, ARTERIAL pH units 7.417   PO2 ART mm Hg 68.6*   PCO2, ARTERIAL mm Hg 44.4   HCO3 ART mmol/L 28.6*      COVID LABS:  Results From Last 14 Days   Lab Units 10/03/21  0955 10/02/21  1342 10/02/21  0351 10/01/21  0513 09/30/21  0423 09/29/21  0528 09/28/21  0433 09/27/21  0401 09/27/21  0401 09/26/21  0417 09/26/21  0417 09/25/21  0608 09/25/21  0346 09/24/21  0537 09/23/21  0442 09/22/21  1538   PROBNP pg/mL  --   --   --   --   --   --   --   --   --   --   --   --   --   --   --  103.7   CK TOTAL U/L  --   --   --   --   --   --   --   --  34  --  48  --  51   < > 153  --    CRP mg/dL <0.30 <0.30  --   --   --   --   --   --  3.12*   < > 4.89*   < > 9.80*   < > 24.67*  --    D DIMER QUANT MCGFEU/mL 1.06*  --   --  0.60*  --  0.75*  --    < > 0.61*  --   --    < >  --   --  1.66*  --    FERRITIN ng/mL 725.00* 850.00*  --   --   --   --   --   --  1,056.00*   < > 1,212.00*   < > 1,557.00*   < > 1,121.00*  --    LACTATE mmol/L  --   --   --   --   --   --   --   --   --   --   --   --   --   --   --  1.4   LDH U/L  --   --   --   --   --   --   --   --   --   --   --   --   --   --  517*  --    PROCALCITONIN ng/mL  --   --  0.05  --  0.05  --  0.07  --   --    < > 0.09  --   --    < >  --  0.16   PROTIME Seconds  --   --   --   --   --   --   --   --   --   --   --   --   --   --   --  14.9*   INR   --   --   --   --   --   --   --   --   --   --   --   --   --   --   --  1.19*   TROPONIN T ng/mL  --   --   --   --   --   --   --   --   --   --   --   --   --   --   --  <0.010    < > = values in this interval not displayed.         Results Review:    I have reviewed the relevant laboratory results  and independently reviewed the chest imaging from this hospitalization including the available echocardiogram reports personally and summarized it if/ when appropriate below    Assessment    Acute hypoxic respiratory failure  Noninvasive ventilator dependent -9/24  Acute Covid pneumonia  Elevated inflammatory markers s/p Actemra-9/24  Hypernatremia  JENAE on CPAP  Morbid obesity    PLAN:  Patient is doing better with regards to her respiratory failure on heated high flow nasal cannula at 60%.  Review repeat chest x-ray does not show any acute issues.  Will wean down steroids. Continue trend inflammatory markers  Hyponatremia is improved.    Continue with nocturnal BiPAP to help with work of breathing as well as sleep apnea  Will use as needed diuretics to keep her as negative as possible  Headache likely from high flow nasal cannula and recommend nasal sprays.  We will add antihistamine  DVT prophylaxis  Out of bed    Hoping to make further progress over the next few days.    Jerry Matos MD  10/3/2021

## 2021-10-03 NOTE — PROGRESS NOTES
Name: Brodie Bueno ADMIT: 2021   : 1960  PCP: Angie Olivarez MD    MRN: [id number] LOS: 11 days   AGE/SEX: 60 y.o. female  ROOM: [facility]     Subjective   Subjective   CC: dyspnea  No acute events. Patient complains of sinus congestion and mild headache but otherwise is feeling about the same aside from dyspnea which is slowly improving. No CP/f/c/n/v/d.    Objective   Objective   Vital Signs  Temp:  [96 °F (35.6 °C)-98.7 °F (37.1 °C)] 98.7 °F (37.1 °C)  Heart Rate:  [75-95] 83  Resp:  [17-22] 22  BP: ()/(65-68) 94/67  SpO2:  [91 %-98 %] 98 %  on  Flow (L/min):  [7-60] 7;   Device (Oxygen Therapy): humidified;high-flow nasal cannula  Body mass index is 34.05 kg/m².  Physical Exam  Vitals and nursing note reviewed.   Constitutional:       General: She is not in acute distress.     Appearance: She is not toxic-appearing or diaphoretic.   HENT:      Head: Normocephalic and atraumatic.      Nose: Nose normal.      Mouth/Throat:      Mouth: Mucous membranes are moist.      Pharynx: Oropharynx is clear.   Eyes:      Extraocular Movements: Extraocular movements intact.      Conjunctiva/sclera: Conjunctivae normal.      Pupils: Pupils are equal, round, and reactive to light.   Cardiovascular:      Rate and Rhythm: Normal rate and regular rhythm.      Pulses: Normal pulses.   Pulmonary:      Effort: Pulmonary effort is normal.      Breath sounds: Examination of the right-lower field reveals decreased breath sounds. Examination of the left-lower field reveals decreased breath sounds. Decreased breath sounds present.   Abdominal:      General: Bowel sounds are normal.      Tenderness: There is no abdominal tenderness.   Musculoskeletal:         General: Swelling (trace BLE) present. No tenderness.      Cervical back: Normal range of motion and neck supple.   Skin:     General: Skin is warm and dry.      Capillary Refill: Capillary refill takes less than 2 seconds.   Neurological:      General:  No focal deficit present.      Mental Status: She is alert and oriented to person, place, and time.   Psychiatric:         Mood and Affect: Mood normal.         Behavior: Behavior normal.       Results Review     I reviewed the patient's new clinical results.  I reviewed the patient's telemetry.  I reviewed the patient's chest xray  Results from last 7 days   Lab Units 10/03/21  0955 10/02/21  0351 10/01/21  0513 09/30/21  0423   WBC 10*3/mm3 7.36 6.28 5.87 5.29   HEMOGLOBIN g/dL 13.0 12.4 12.5 12.4   PLATELETS 10*3/mm3 262 270 322 332     Results from last 7 days   Lab Units 10/03/21  0955 10/02/21  0351 10/01/21  0513 09/30/21  0423   SODIUM mmol/L 138 139 138 143   POTASSIUM mmol/L 4.2 4.2 4.3 4.7   CHLORIDE mmol/L 99 104 102 106   CO2 mmol/L 29.6* 28.7 27.9 28.9   BUN mg/dL 23 24* 26* 29*   CREATININE mg/dL 0.59 0.49* 0.56* 0.63   GLUCOSE mg/dL 236* 177* 268* 279*   Estimated Creatinine Clearance: 111.3 mL/min (by C-G formula based on SCr of 0.59 mg/dL).  Results from last 7 days   Lab Units 10/03/21  0955 10/02/21  0351 10/01/21  0513 09/30/21  0423   ALBUMIN g/dL 3.20* 2.80* 3.20* 3.10*   BILIRUBIN mg/dL 0.5 0.5 0.5 0.6   ALK PHOS U/L 59 51 70 66   AST (SGOT) U/L 26 21 21 22   ALT (SGPT) U/L 36* 33 26 26     Results from last 7 days   Lab Units 10/03/21  0955 10/02/21  0351 10/01/21  0513 09/30/21  0423   CALCIUM mg/dL 8.8 8.2* 8.5* 8.6   ALBUMIN g/dL 3.20* 2.80* 3.20* 3.10*     Results from last 7 days   Lab Units 10/02/21  0351 09/30/21  0423 09/28/21  0433   PROCALCITONIN ng/mL 0.05 0.05 0.07     COVID19   Date Value Ref Range Status   09/22/2021 Detected (C) Not Detected - Ref. Range Final     Glucose   Date/Time Value Ref Range Status   10/03/2021 1646 119 70 - 130 mg/dL Final     Comment:     Meter: [id number] : [id number] Leni Hackett NA   10/03/2021 1121 256 (H) 70 - 130 mg/dL Final     Comment:     Meter: [id number] : [id number] Randy Edmonds NA   10/03/2021 0557 138 (H) 70 - 130 mg/dL Final      Comment:     Meter: [id number] : [id number] Raymundo Mosley NA   10/03/2021 0006 238 (H) 70 - 130 mg/dL Final     Comment:     Meter: [id number] : [id number] Jonny Denise NA   10/02/2021 1919 185 (H) 70 - 130 mg/dL Final     Comment:     Meter: [id number] : [id number] Jose Doran NA   10/02/2021 1754 193 (H) 70 - 130 mg/dL Final     Comment:     Meter: [id number] : [id number] Kodi Cui NA   10/02/2021 1148 227 (H) 70 - 130 mg/dL Final     Comment:     Meter: [id number] : [id number] Billy Logan NA       XR Chest 1 View  ONE VIEW PORTABLE CHEST AT 2:13 PM     HISTORY: Respiratory failure. Covid 19 pneumonia.     FINDINGS: The lungs are moderately expanded with scattered areas of  pneumonia likely related to Covid 19 pneumonia and showing slight  improvement from 09/23/2021. The heart remains slightly enlarged. A  feeding tube ends below the diaphragm.     This report was finalized on 10/2/2021 2:32 PM by Dr. Laith Balderas M.D.       Scheduled Medications  cetirizine, 10 mg, Oral, Daily  [START ON 10/4/2021] dexamethasone, 6 mg, Intravenous, Daily  dextromethorphan polistirex ER, 10 mL, Nasogastric, BID  enoxaparin, 40 mg, Subcutaneous, Q12H  gabapentin, 100 mg, Nasogastric, Daily  gabapentin, 200 mg, Nasogastric, Q PM  insulin glargine, 20 Units, Subcutaneous, Q12H  insulin lispro, 0-9 Units, Subcutaneous, Q6H  ipratropium-albuterol, 3 mL, Nebulization, 4x Daily - RT  lansoprazole, 30 mg, Oral, QAM  losartan, 50 mg, Nasogastric, Q24H  oxymetazoline, 2 spray, Each Nare, BID  polyethylene glycol, 17 g, Oral, Daily  senna, 1 tablet, Oral, BID  Thyroid, 30 mg, Nasogastric, QAM AC    Infusions   Diet  Diet Dysphagia; IV - Mechanical Soft No Mixed Consistencies; Nectar / Syrup Thick; No Straws, Extra Sauce / Gravy       Assessment/Plan     Active Hospital Problems    Diagnosis  POA   • Hypothyroidism (acquired) [E03.9]  Yes   • Acute hypoxemic respiratory failure due to COVID-19 (HCC) [U07.1,  J96.01]  Yes   • Obesity (BMI 30-39.9) [E66.9]  Yes   • Type II diabetes mellitus, uncontrolled (HCC) [E11.65]  Yes   • Pneumonia due to COVID-19 virus [U07.1, J12.82]  Yes   • Hypertension [I10]  Yes   • JENAE (obstructive sleep apnea) [G47.33]  Yes   • Gastroesophageal reflux disease [K21.9]  Yes      Resolved Hospital Problems   No resolved problems to display.   COVID-19 Pneumonia with Acute Hypoxic Respiratory Failure  - patient is unvaccinated  - s/p remdesivir and tocilizumab  - continue on decadron, slowly wean  - follow inflammatory markers  - encourage pulmonary toilet and wean oxygen as tolerated- nasal saline and antihistamine for congestion associated with high flow oxygen  - appreciate pulmonology recs and critical care management    JENAE  - on BiPAP at night    Type 2 DM  - hyperglycemia exacerbated by steroids-now that these are being weaned will decrease insulin  - decrease lantus to 10 units Q12H  - cover with ssi/hypoglycemia protocol  - has peripheral neuropathy on gabapentin    HTN  - continue losartan    GERD  - on lansoprazole    Hypothyroidism  - continue [place] Thyroid    Morbid Obesity   - Complicating above    Lovenox 40mg SC Q12H for DVT prophylaxis.  Full code.  Discussed with patient and nursing staff.  Anticipate discharge TBD timing yet to be determined.      iNi Cardenas MD  [facility]  10/03/21  17:49 EDT

## 2021-10-03 NOTE — PLAN OF CARE
Goal Outcome Evaluation:           Progress: improving  Outcome Summary: Patient is now on 7L hiflo, o2 sats 93%, complaining of lower back pain, prn meds given, only eating about 15% of her meals today.

## 2021-10-03 NOTE — PLAN OF CARE
Goal Outcome Evaluation:           Progress: improving  Outcome Summary: Medications given with pudding/nectar thick liquids. Patient tolerated this well. She spent most of the the night on bipap. This am she complained of aching pain 8/10 her left hip and back. She stated she would like Tylenol and upon pain reassessment she was 6/10 but visibly uncomfortable and moaning. Offered ice pack/repositioning. Patient declined both and stated she did not need anything further but that when she gets up today she will feel better. Was able to get patient to roll onto her right side briefly but then she wanted to lay on her back again.

## 2021-10-04 LAB
ALBUMIN SERPL-MCNC: 2.9 G/DL (ref 3.5–5.2)
ALBUMIN/GLOB SERPL: 1.3 G/DL
ALP SERPL-CCNC: 55 U/L (ref 39–117)
ALT SERPL W P-5'-P-CCNC: 41 U/L (ref 1–33)
ANION GAP SERPL CALCULATED.3IONS-SCNC: 12.2 MMOL/L (ref 5–15)
AST SERPL-CCNC: 29 U/L (ref 1–32)
BILIRUB SERPL-MCNC: 0.4 MG/DL (ref 0–1.2)
BUN SERPL-MCNC: 23 MG/DL (ref 8–23)
BUN/CREAT SERPL: 36.5 (ref 7–25)
CALCIUM SPEC-SCNC: 8.5 MG/DL (ref 8.6–10.5)
CHLORIDE SERPL-SCNC: 101 MMOL/L (ref 98–107)
CO2 SERPL-SCNC: 27.8 MMOL/L (ref 22–29)
CREAT SERPL-MCNC: 0.63 MG/DL (ref 0.57–1)
CRP SERPL-MCNC: <0.3 MG/DL (ref 0–0.5)
DEPRECATED RDW RBC AUTO: 44 FL (ref 37–54)
ERYTHROCYTE [DISTWIDTH] IN BLOOD BY AUTOMATED COUNT: 13.5 % (ref 12.3–15.4)
FERRITIN SERPL-MCNC: 728 NG/ML (ref 13–150)
GFR SERPL CREATININE-BSD FRML MDRD: 116 ML/MIN/1.73
GLOBULIN UR ELPH-MCNC: 2.3 GM/DL
GLUCOSE BLDC GLUCOMTR-MCNC: 106 MG/DL (ref 70–130)
GLUCOSE BLDC GLUCOMTR-MCNC: 108 MG/DL (ref 70–130)
GLUCOSE BLDC GLUCOMTR-MCNC: 175 MG/DL (ref 70–130)
GLUCOSE BLDC GLUCOMTR-MCNC: 220 MG/DL (ref 70–130)
GLUCOSE BLDC GLUCOMTR-MCNC: 223 MG/DL (ref 70–130)
GLUCOSE SERPL-MCNC: 105 MG/DL (ref 65–99)
HCT VFR BLD AUTO: 38.2 % (ref 34–46.6)
HGB BLD-MCNC: 12.7 G/DL (ref 12–15.9)
MCH RBC QN AUTO: 30.2 PG (ref 26.6–33)
MCHC RBC AUTO-ENTMCNC: 33.2 G/DL (ref 31.5–35.7)
MCV RBC AUTO: 91 FL (ref 79–97)
PLATELET # BLD AUTO: 187 10*3/MM3 (ref 140–450)
PMV BLD AUTO: 10.6 FL (ref 6–12)
POTASSIUM SERPL-SCNC: 4 MMOL/L (ref 3.5–5.2)
PROCALCITONIN SERPL-MCNC: 0.05 NG/ML (ref 0–0.25)
PROT SERPL-MCNC: 5.2 G/DL (ref 6–8.5)
RBC # BLD AUTO: 4.2 10*6/MM3 (ref 3.77–5.28)
SODIUM SERPL-SCNC: 141 MMOL/L (ref 136–145)
WBC # BLD AUTO: 6.39 10*3/MM3 (ref 3.4–10.8)

## 2021-10-04 PROCEDURE — 82728 ASSAY OF FERRITIN: CPT | Performed by: INTERNAL MEDICINE

## 2021-10-04 PROCEDURE — 94799 UNLISTED PULMONARY SVC/PX: CPT

## 2021-10-04 PROCEDURE — 94660 CPAP INITIATION&MGMT: CPT

## 2021-10-04 PROCEDURE — 25010000002 ENOXAPARIN PER 10 MG: Performed by: INTERNAL MEDICINE

## 2021-10-04 PROCEDURE — 86140 C-REACTIVE PROTEIN: CPT | Performed by: INTERNAL MEDICINE

## 2021-10-04 PROCEDURE — 82962 GLUCOSE BLOOD TEST: CPT

## 2021-10-04 PROCEDURE — 63710000001 INSULIN LISPRO (HUMAN) PER 5 UNITS: Performed by: INTERNAL MEDICINE

## 2021-10-04 PROCEDURE — 85027 COMPLETE CBC AUTOMATED: CPT | Performed by: INTERNAL MEDICINE

## 2021-10-04 PROCEDURE — 80053 COMPREHEN METABOLIC PANEL: CPT | Performed by: INTERNAL MEDICINE

## 2021-10-04 PROCEDURE — 84145 PROCALCITONIN (PCT): CPT | Performed by: INTERNAL MEDICINE

## 2021-10-04 PROCEDURE — 25010000002 DEXAMETHASONE PER 1 MG: Performed by: INTERNAL MEDICINE

## 2021-10-04 PROCEDURE — 63710000001 INSULIN GLARGINE PER 5 UNITS: Performed by: INTERNAL MEDICINE

## 2021-10-04 RX ADMIN — OXYMETAZOLINE HCL 2 SPRAY: 0.05 SPRAY NASAL at 21:24

## 2021-10-04 RX ADMIN — POLYETHYLENE GLYCOL 3350 17 G: 17 POWDER, FOR SOLUTION ORAL at 08:35

## 2021-10-04 RX ADMIN — GABAPENTIN 100 MG: 100 CAPSULE ORAL at 08:36

## 2021-10-04 RX ADMIN — ENOXAPARIN SODIUM 40 MG: 40 INJECTION SUBCUTANEOUS at 06:01

## 2021-10-04 RX ADMIN — IPRATROPIUM BROMIDE AND ALBUTEROL SULFATE 3 ML: 2.5; .5 SOLUTION RESPIRATORY (INHALATION) at 12:12

## 2021-10-04 RX ADMIN — GABAPENTIN 200 MG: 100 CAPSULE ORAL at 17:55

## 2021-10-04 RX ADMIN — CETIRIZINE HYDROCHLORIDE 10 MG: 10 TABLET ORAL at 08:35

## 2021-10-04 RX ADMIN — ENOXAPARIN SODIUM 40 MG: 40 INJECTION SUBCUTANEOUS at 17:55

## 2021-10-04 RX ADMIN — DEXTROMETHORPHAN POLISTIREX 60 MG: 30 SUSPENSION, EXTENDED RELEASE ORAL at 08:35

## 2021-10-04 RX ADMIN — DEXAMETHASONE SODIUM PHOSPHATE 6 MG: 10 INJECTION INTRAMUSCULAR; INTRAVENOUS at 08:35

## 2021-10-04 RX ADMIN — INSULIN LISPRO 4 UNITS: 100 INJECTION, SOLUTION INTRAVENOUS; SUBCUTANEOUS at 17:55

## 2021-10-04 RX ADMIN — IPRATROPIUM BROMIDE AND ALBUTEROL SULFATE 3 ML: 2.5; .5 SOLUTION RESPIRATORY (INHALATION) at 16:41

## 2021-10-04 RX ADMIN — OXYMETAZOLINE HCL 2 SPRAY: 0.05 SPRAY NASAL at 08:36

## 2021-10-04 RX ADMIN — DEXTROMETHORPHAN POLISTIREX 60 MG: 30 SUSPENSION, EXTENDED RELEASE ORAL at 21:24

## 2021-10-04 RX ADMIN — IPRATROPIUM BROMIDE AND ALBUTEROL SULFATE 3 ML: 2.5; .5 SOLUTION RESPIRATORY (INHALATION) at 20:20

## 2021-10-04 RX ADMIN — INSULIN GLARGINE 10 UNITS: 100 INJECTION, SOLUTION SUBCUTANEOUS at 21:24

## 2021-10-04 RX ADMIN — LEVOTHYROXINE, LIOTHYRONINE 30 MG: 19; 4.5 TABLET ORAL at 06:01

## 2021-10-04 RX ADMIN — IPRATROPIUM BROMIDE AND ALBUTEROL SULFATE 3 ML: 2.5; .5 SOLUTION RESPIRATORY (INHALATION) at 07:40

## 2021-10-04 RX ADMIN — SENNOSIDES 1 TABLET: 8.6 TABLET, FILM COATED ORAL at 21:23

## 2021-10-04 RX ADMIN — SENNOSIDES 1 TABLET: 8.6 TABLET, FILM COATED ORAL at 08:34

## 2021-10-04 RX ADMIN — INSULIN GLARGINE 10 UNITS: 100 INJECTION, SOLUTION SUBCUTANEOUS at 09:00

## 2021-10-04 RX ADMIN — LANSOPRAZOLE 30 MG: KIT at 06:01

## 2021-10-04 NOTE — CASE MANAGEMENT/SOCIAL WORK
Continued Stay Note  [facility]     Patient Name: Brodie Bueno  MRN: [id number]  Today's Date: 10/4/2021    Admit Date: 9/22/2021    Discharge Plan     Row Name 10/04/21 1533       Plan    Plan  SNF vs return home pending progress    Patient/Family in Agreement with Plan  yes    Plan Comments  Patient is confused per nursing.  Spoke with daughter Jillian by telephone.  She is agreeable to referrals to [facility] and to [facility].  Currently on 7l Hi-flow.  Still has Cortrak.  CCP will continue to follow.  BHumeniuk RN       Expected Discharge Date and Time     Expected Discharge Date Expected Discharge Time    Oct 10, 2021             Becky S. Humeniuk, RN

## 2021-10-04 NOTE — DISCHARGE PLACEMENT REQUEST
"Brodie Bueno (60 y.o. Female)     Date of Birth Social Security Number Address Home Phone MRN    1960  [facility] [address]  [facility] [address] [phone] [id number]    Judaism Marital Status          Buddhist        Admission Date Admission Type Admitting Provider Attending Provider Department, Room/Bed    9/22/21 Emergency Stingl, MD Theresa Agrawal Matthew D, MD [address], [address]    Discharge Date Discharge Disposition Discharge Destination                       Attending Provider: Nii Cardenas MD    Allergies: Codeine, Lactose Intolerance (Gi), Morphine And Related, Amoxicillin, Hctz [Hydrochlorothiazide]    Isolation: Enh Drop/Con   Infection: COVID (confirmed) (09/22/21)   Code Status: CPR    Ht: 165.1 cm (65\")   Wt: 94.6 kg (208 lb 9.6 oz)    Admission Cmt: None   Principal Problem: None                Active Insurance as of 9/22/2021     Primary Coverage     Payor Plan Insurance Group Employer/Plan Group    HUMANA HUMANA [id number]     Payor Plan Address Payor Plan Phone Number Payor Plan Fax Number Effective Dates    [address] [address] [phone]  7/1/2016 - None Entered    [facility] [address]       Subscriber Name Subscriber Birth Date Member ID       BRODIE BUENO 1960 [id number]                 Emergency Contacts      (Rel.) Home Phone Work Phone Mobile Phone    Emmie Gonzalez (Mother) [phone] -- --    yesy Chávez (Daughter) [phone] -- --    Omari Chávez (Son) -- -- [phone]            "

## 2021-10-04 NOTE — PROGRESS NOTES
Jerry Matos MD                          [phone]      Patient ID:    Name:  Brodie Bueno    MRN:  [id number]    1960   60 y.o.  female            Patient Care Team:  Angie Olivarez MD as PCP - General (Internal Medicine)    CC/ Reason for visit: Acute respiratory failure, acute COVID-19 pneumonia, obesity    Subjective: Pt seen and examined this AM. No acute overnight events noted. Doing better.  On regular high flow nasal cannula now.  Wean down to 4 to 5 L. states that she is feeling better from breathing standpoint. Happy with progress    ROS: Denies any subjective fevers, syncope or presyncopal events, new neurological deficits, nausea or vomiting currently    Objective     Vital Signs past 24hrs    BP range: BP: ()/(52-77) 112/74  Pulse range: Heart Rate:  [] 102  Resp rate range: Resp:  [16-20] 20  Temp range: Temp (24hrs), Av.1 °F (36.7 °C), Min:97.8 °F (36.6 °C), Max:98.7 °F (37.1 °C)      Ventilator/Non-Invasive Ventilation Settings (From admission, onward)     Start     Ordered    21 1143  NIPPV (CPAP or BIPAP)  Until Discontinued     Question Answer Comment   Indication: Acute Respiratory Failure    Type: BIPAP    NIPPV Mask Interface: Per Patient Preference    Titrate for SPO2 90%        21 1143                Device (Oxygen Therapy): humidified;high-flow nasal cannula       94.6 kg (208 lb 9.6 oz); Body mass index is 34.71 kg/m².      Intake/Output Summary (Last 24 hours) at 10/4/2021 1803  Last data filed at 10/4/2021 0613  Gross per 24 hour   Intake 1937 ml   Output 600 ml   Net 1337 ml       PHYSICAL EXAM   Constitutional: Middle aged obese -American female pt in chair, No acute respiratory distress, + accessory muscle use  Head: - NCAT  Eyes: No pallor.  Anicteric sclerae, EOMI.  ENMT:  Mallampati 4, no oral thrush. Moist MM.   NECK: Trachea midline, No thyromegaly, no palpable cervical  lymphadenopathy  Heart: RRR, no murmur. Trace pedal edema   Lungs: VALERIANO +, No wheezes/ crackles heard    Abdomen: Soft.  Obese no tenderness, guarding or rigidity. No palpable masses  Extremities: Extremities warm and well perfused. No cyanosis/ clubbing  Neuro: Conscious, answers appropriately, no gross focal neuro deficits  Psych: Mood and affect appropriate    PPE recommended per [facility] infectious disease Isolation protocol for the current clinical scenario(as mentioned below) was followed.     Scheduled meds:  cetirizine, 10 mg, Oral, Daily  dexamethasone, 6 mg, Intravenous, Daily  dextromethorphan polistirex ER, 10 mL, Nasogastric, BID  enoxaparin, 40 mg, Subcutaneous, Q12H  gabapentin, 100 mg, Nasogastric, Daily  gabapentin, 200 mg, Nasogastric, Q PM  insulin glargine, 10 Units, Subcutaneous, Q12H  insulin lispro, 0-9 Units, Subcutaneous, Q6H  ipratropium-albuterol, 3 mL, Nebulization, 4x Daily - RT  lansoprazole, 30 mg, Oral, QAM  losartan, 50 mg, Nasogastric, Q24H  oxymetazoline, 2 spray, Each Nare, BID  polyethylene glycol, 17 g, Oral, Daily  senna, 1 tablet, Oral, BID  Thyroid, 30 mg, Nasogastric, QAM AC        IV meds:                           Data Review:      Results from last 7 days   Lab Units 10/04/21  0527 10/03/21  0955 10/02/21  0351 10/01/21  0513 09/30/21  0423   SODIUM mmol/L 141 138 139   < > 143   POTASSIUM mmol/L 4.0 4.2 4.2   < > 4.7   CHLORIDE mmol/L 101 99 104   < > 106   CO2 mmol/L 27.8 29.6* 28.7   < > 28.9   BUN mg/dL 23 23 24*   < > 29*   CREATININE mg/dL 0.63 0.59 0.49*   < > 0.63   CALCIUM mg/dL 8.5* 8.8 8.2*   < > 8.6   BILIRUBIN mg/dL 0.4 0.5 0.5   < > 0.6   ALK PHOS U/L 55 59 51   < > 66   ALT (SGPT) U/L 41* 36* 33   < > 26   AST (SGOT) U/L 29 26 21   < > 22   GLUCOSE mg/dL 105* 236* 177*   < > 279*   WBC 10*3/mm3 6.39 7.36 6.28   < > 5.29   HEMOGLOBIN g/dL 12.7 13.0 12.4   < > 12.4   PLATELETS 10*3/mm3 187 262 270   < > 332   PROCALCITONIN ng/mL 0.05  --  0.05  --   0.05    < > = values in this interval not displayed.       Lab Results   Component Value Date    CALCIUM 8.5 (L) 10/04/2021             Results from last 7 days   Lab Units 09/29/21  0536   PH, ARTERIAL pH units 7.417   PO2 ART mm Hg 68.6*   PCO2, ARTERIAL mm Hg 44.4   HCO3 ART mmol/L 28.6*      COVID LABS:  Results From Last 14 Days   Lab Units 10/04/21  0527 10/03/21  0955 10/02/21  1342 10/02/21  0351 10/01/21  0513 09/30/21  0423 09/29/21  0528 09/28/21  0433 09/27/21  0401 09/27/21  0401 09/26/21  0417 09/26/21  0417 09/25/21  0608 09/25/21  0346 09/24/21  0537 09/23/21  0442 09/22/21  1538   PROBNP pg/mL  --   --   --   --   --   --   --   --   --   --   --   --   --   --   --   --  103.7   CK TOTAL U/L  --   --   --   --   --   --   --   --   --  34  --  48  --  51   < > 153  --    CRP mg/dL <0.30 <0.30 <0.30  --   --   --   --   --    < > 3.12*   < > 4.89*   < > 9.80*   < > 24.67*  --    D DIMER QUANT MCGFEU/mL  --  1.06*  --   --  0.60*  --  0.75*  --   --  0.61*  --   --    < >  --   --  1.66*  --    FERRITIN ng/mL 728.00* 725.00* 850.00*  --   --   --   --   --    < > 1,056.00*   < > 1,212.00*   < > 1,557.00*   < > 1,121.00*  --    LACTATE mmol/L  --   --   --   --   --   --   --   --   --   --   --   --   --   --   --   --  1.4   LDH U/L  --   --   --   --   --   --   --   --   --   --   --   --   --   --   --  517*  --    PROCALCITONIN ng/mL 0.05  --   --  0.05  --  0.05  --    < >  --   --   --  0.09  --   --    < >  --  0.16   PROTIME Seconds  --   --   --   --   --   --   --   --   --   --   --   --   --   --   --   --  14.9*   INR   --   --   --   --   --   --   --   --   --   --   --   --   --   --   --   --  1.19*   TROPONIN T ng/mL  --   --   --   --   --   --   --   --   --   --   --   --   --   --   --   --  <0.010    < > = values in this interval not displayed.         Results Review:    I have reviewed the relevant laboratory results and independently reviewed the chest imaging from this  hospitalization including the available echocardiogram reports personally and summarized it if/ when appropriate below    Assessment    Acute hypoxic respiratory failure  Noninvasive ventilator dependent -9/24  Acute Covid pneumonia  Elevated inflammatory markers s/p Actemra-9/24  Hypernatremia  JENAE on CPAP  Morbid obesity    PLAN:  Patient is continuing to make progress and is now on regular high flow nasal cannula.  Feels like she is getting better.  Will wean down steroids. Continue trend inflammatory markers.    Continue with nocturnal BiPAP to help with work of breathing as well as sleep apnea  Will use as needed diuretics to keep her as negative as possible    DVT prophylaxis  Out of bed    Heading towards making good recovery.  Should be able to be discharged in 48- 72 hours if she continues to make progress with    Jerry Matos MD  10/4/2021

## 2021-10-04 NOTE — PROGRESS NOTES
Name: Brodie Bueno ADMIT: 2021   : 1960  PCP: Angie Olivarez MD    MRN: [id number] LOS: 12 days   AGE/SEX: 60 y.o. female  ROOM: [facility]     Subjective   Subjective   CC: dyspnea  No acute events. Patient overall feels much better. Her headache has resolved. Tolerating tube feeds. Taking some PO with modified diet. No CP/f/c/n/v/d.    Objective   Objective   Vital Signs  Temp:  [97.8 °F (36.6 °C)-98.7 °F (37.1 °C)] 98 °F (36.7 °C)  Heart Rate:  [] 102  Resp:  [16-20] 20  BP: ()/(52-77) 112/74  SpO2:  [90 %-95 %] 95 %  on  Flow (L/min):  [6-8] 6;   Device (Oxygen Therapy): humidified;high-flow nasal cannula  Body mass index is 34.71 kg/m².  Physical Exam  Vitals and nursing note reviewed.   Constitutional:       General: She is not in acute distress.     Appearance: She is not toxic-appearing or diaphoretic.   HENT:      Head: Normocephalic and atraumatic.      Nose: Nose normal.      Mouth/Throat:      Mouth: Mucous membranes are moist.      Pharynx: Oropharynx is clear.   Eyes:      Extraocular Movements: Extraocular movements intact.      Conjunctiva/sclera: Conjunctivae normal.      Pupils: Pupils are equal, round, and reactive to light.   Cardiovascular:      Rate and Rhythm: Normal rate and regular rhythm.      Pulses: Normal pulses.   Pulmonary:      Effort: Pulmonary effort is normal.      Breath sounds: Examination of the right-lower field reveals decreased breath sounds. Examination of the left-lower field reveals decreased breath sounds. Decreased breath sounds present.   Abdominal:      General: Bowel sounds are normal.      Tenderness: There is no abdominal tenderness.   Musculoskeletal:         General: Swelling (trace BLE) present. No tenderness.      Cervical back: Normal range of motion and neck supple.   Skin:     General: Skin is warm and dry.      Capillary Refill: Capillary refill takes less than 2 seconds.   Neurological:      General: No focal deficit  present.      Mental Status: She is alert and oriented to person, place, and time.   Psychiatric:         Mood and Affect: Mood normal.         Behavior: Behavior normal.     Results Review     I reviewed the patient's new clinical results.  I reviewed the patient's telemetry.  Results from last 7 days   Lab Units 10/04/21  0527 10/03/21  0955 10/02/21  0351 10/01/21  0513   WBC 10*3/mm3 6.39 7.36 6.28 5.87   HEMOGLOBIN g/dL 12.7 13.0 12.4 12.5   PLATELETS 10*3/mm3 187 262 270 322     Results from last 7 days   Lab Units 10/04/21  0527 10/03/21  0955 10/02/21  0351 10/01/21  0513   SODIUM mmol/L 141 138 139 138   POTASSIUM mmol/L 4.0 4.2 4.2 4.3   CHLORIDE mmol/L 101 99 104 102   CO2 mmol/L 27.8 29.6* 28.7 27.9   BUN mg/dL 23 23 24* 26*   CREATININE mg/dL 0.63 0.59 0.49* 0.56*   GLUCOSE mg/dL 105* 236* 177* 268*   Estimated Creatinine Clearance: 105.2 mL/min (by C-G formula based on SCr of 0.63 mg/dL).  Results from last 7 days   Lab Units 10/04/21  0527 10/03/21  0955 10/02/21  0351 10/01/21  0513   ALBUMIN g/dL 2.90* 3.20* 2.80* 3.20*   BILIRUBIN mg/dL 0.4 0.5 0.5 0.5   ALK PHOS U/L 55 59 51 70   AST (SGOT) U/L 29 26 21 21   ALT (SGPT) U/L 41* 36* 33 26     Results from last 7 days   Lab Units 10/04/21  0527 10/03/21  0955 10/02/21  0351 10/01/21  0513   CALCIUM mg/dL 8.5* 8.8 8.2* 8.5*   ALBUMIN g/dL 2.90* 3.20* 2.80* 3.20*     Results from last 7 days   Lab Units 10/04/21  0527 10/02/21  0351 09/30/21  0423 09/28/21  0433   PROCALCITONIN ng/mL 0.05 0.05 0.05 0.07     COVID19   Date Value Ref Range Status   09/22/2021 Detected (C) Not Detected - Ref. Range Final     Glucose   Date/Time Value Ref Range Status   10/04/2021 1658 223 (H) 70 - 130 mg/dL Final     Comment:     Meter: [id number] : [id number] [facility]   10/04/2021 1127 220 (H) 70 - 130 mg/dL Final     Comment:     Meter: [id number] : [id number] [facility]   10/04/2021 0600 108 70 - 130 mg/dL Final     Comment:     Meter: [id number]  : [id number] Ethan Ponce RN   10/04/2021 0024 106 70 - 130 mg/dL Final     Comment:     Meter: [id number] : [id number] Ethan Ponce RN   10/03/2021 2021 118 70 - 130 mg/dL Final     Comment:     Meter: [id number] : [id number] Jose Doran NA   10/03/2021 1646 119 70 - 130 mg/dL Final     Comment:     Meter: [id number] : [id number] McStoqiana Hackett NA   10/03/2021 1121 256 (H) 70 - 130 mg/dL Final     Comment:     Meter: [id number] : [id number] Randy Edmonds NA       XR Chest 1 View  ONE VIEW PORTABLE CHEST AT 2:13 PM     HISTORY: Respiratory failure. Covid 19 pneumonia.     FINDINGS: The lungs are moderately expanded with scattered areas of  pneumonia likely related to Covid 19 pneumonia and showing slight  improvement from 09/23/2021. The heart remains slightly enlarged. A  feeding tube ends below the diaphragm.     This report was finalized on 10/2/2021 2:32 PM by Dr. Laith Balderas M.D.       Scheduled Medications  cetirizine, 10 mg, Oral, Daily  dexamethasone, 6 mg, Intravenous, Daily  dextromethorphan polistirex ER, 10 mL, Nasogastric, BID  enoxaparin, 40 mg, Subcutaneous, Q12H  gabapentin, 100 mg, Nasogastric, Daily  gabapentin, 200 mg, Nasogastric, Q PM  insulin glargine, 10 Units, Subcutaneous, Q12H  insulin lispro, 0-9 Units, Subcutaneous, Q6H  ipratropium-albuterol, 3 mL, Nebulization, 4x Daily - RT  lansoprazole, 30 mg, Oral, QAM  losartan, 50 mg, Nasogastric, Q24H  oxymetazoline, 2 spray, Each Nare, BID  polyethylene glycol, 17 g, Oral, Daily  senna, 1 tablet, Oral, BID  Thyroid, 30 mg, Nasogastric, QAM AC    Infusions   Diet  Diet Dysphagia; IV - Mechanical Soft No Mixed Consistencies; Nectar / Syrup Thick; No Straws, Extra Sauce / Gravy       Assessment/Plan     Active Hospital Problems    Diagnosis  POA   • Hypothyroidism (acquired) [E03.9]  Yes   • Acute hypoxemic respiratory failure due to COVID-19 (HCC) [U07.1, J96.01]  Yes   • Obesity (BMI 30-39.9) [E66.9]  Yes   • Type II  diabetes mellitus, uncontrolled (HCC) [E11.65]  Yes   • Pneumonia due to COVID-19 virus [U07.1, J12.82]  Yes   • Hypertension [I10]  Yes   • JENAE (obstructive sleep apnea) [G47.33]  Yes   • Gastroesophageal reflux disease [K21.9]  Yes      Resolved Hospital Problems   No resolved problems to display.   COVID-19 Pneumonia with Acute Hypoxic Respiratory Failure  - patient is unvaccinated  - s/p remdesivir and tocilizumab  - continue weaning decadron  - follow inflammatory markers  - encourage pulmonary toilet and wean oxygen as tolerated- nasal saline and antihistamine for congestion associated with high flow oxygen  - appreciate pulmonology recs and critical care management    JENAE  - on BiPAP at night    Type 2 DM  - hyperglycemia exacerbated by steroids-these are being weaned  - continue lantus 10 units Q12H  - cover with ssi/hypoglycemia protocol  - has peripheral neuropathy on gabapentin    Oropharyngeal Dysphagia  - on tube feeds  - SLP following, on modified diet-upgrade as able    HTN  - continue losartan    GERD  - on lansoprazole    Hypothyroidism  - continue Whitney Thyroid    Morbid Obesity   - Complicating above    Lovenox 40mg SC Q12H for DVT prophylaxis.  Full code.  Discussed with patient and nursing staff.  Anticipate discharge TBD timing yet to be determined.      Nii Cardenas MD  [facility]  10/04/21  17:23 EDT

## 2021-10-04 NOTE — PLAN OF CARE
Goal Outcome Evaluation:           Progress: improving  Outcome Summary: Pt is on 8L hi flow, no complaints of pain, A&Ox4, still no appetite and only eating about 15% of meals.

## 2021-10-05 PROBLEM — Z79.4 TYPE 2 DIABETES MELLITUS WITH DIABETIC POLYNEUROPATHY, WITH LONG-TERM CURRENT USE OF INSULIN (HCC): Status: ACTIVE | Noted: 2021-09-23

## 2021-10-05 PROBLEM — R13.12 OROPHARYNGEAL DYSPHAGIA: Status: ACTIVE | Noted: 2021-10-05

## 2021-10-05 PROBLEM — E11.42 TYPE 2 DIABETES MELLITUS WITH DIABETIC POLYNEUROPATHY, WITH LONG-TERM CURRENT USE OF INSULIN (HCC): Status: ACTIVE | Noted: 2021-09-23

## 2021-10-05 LAB
ALBUMIN SERPL-MCNC: 3.1 G/DL (ref 3.5–5.2)
ALBUMIN/GLOB SERPL: 1.4 G/DL
ALP SERPL-CCNC: 57 U/L (ref 39–117)
ALT SERPL W P-5'-P-CCNC: 55 U/L (ref 1–33)
ANION GAP SERPL CALCULATED.3IONS-SCNC: 8.8 MMOL/L (ref 5–15)
AST SERPL-CCNC: 27 U/L (ref 1–32)
BILIRUB SERPL-MCNC: 0.4 MG/DL (ref 0–1.2)
BUN SERPL-MCNC: 18 MG/DL (ref 8–23)
BUN/CREAT SERPL: 35.3 (ref 7–25)
CALCIUM SPEC-SCNC: 8.7 MG/DL (ref 8.6–10.5)
CHLORIDE SERPL-SCNC: 101 MMOL/L (ref 98–107)
CO2 SERPL-SCNC: 30.2 MMOL/L (ref 22–29)
CREAT SERPL-MCNC: 0.51 MG/DL (ref 0.57–1)
CRP SERPL-MCNC: <0.3 MG/DL (ref 0–0.5)
D DIMER PPP FEU-MCNC: 0.55 MCGFEU/ML (ref 0–0.49)
DEPRECATED RDW RBC AUTO: 43.5 FL (ref 37–54)
ERYTHROCYTE [DISTWIDTH] IN BLOOD BY AUTOMATED COUNT: 13.7 % (ref 12.3–15.4)
FERRITIN SERPL-MCNC: 646 NG/ML (ref 13–150)
GFR SERPL CREATININE-BSD FRML MDRD: 148 ML/MIN/1.73
GLOBULIN UR ELPH-MCNC: 2.2 GM/DL
GLUCOSE BLDC GLUCOMTR-MCNC: 121 MG/DL (ref 70–130)
GLUCOSE BLDC GLUCOMTR-MCNC: 149 MG/DL (ref 70–130)
GLUCOSE BLDC GLUCOMTR-MCNC: 249 MG/DL (ref 70–130)
GLUCOSE BLDC GLUCOMTR-MCNC: 251 MG/DL (ref 70–130)
GLUCOSE BLDC GLUCOMTR-MCNC: 285 MG/DL (ref 70–130)
GLUCOSE SERPL-MCNC: 133 MG/DL (ref 65–99)
HCT VFR BLD AUTO: 36.3 % (ref 34–46.6)
HGB BLD-MCNC: 12.2 G/DL (ref 12–15.9)
MCH RBC QN AUTO: 29.8 PG (ref 26.6–33)
MCHC RBC AUTO-ENTMCNC: 33.6 G/DL (ref 31.5–35.7)
MCV RBC AUTO: 88.8 FL (ref 79–97)
PLATELET # BLD AUTO: 161 10*3/MM3 (ref 140–450)
PMV BLD AUTO: 11.5 FL (ref 6–12)
POTASSIUM SERPL-SCNC: 3.9 MMOL/L (ref 3.5–5.2)
PROT SERPL-MCNC: 5.3 G/DL (ref 6–8.5)
RBC # BLD AUTO: 4.09 10*6/MM3 (ref 3.77–5.28)
REF LAB TEST RESULTS: NORMAL
SODIUM SERPL-SCNC: 140 MMOL/L (ref 136–145)
WBC # BLD AUTO: 6.34 10*3/MM3 (ref 3.4–10.8)

## 2021-10-05 PROCEDURE — 25010000002 ENOXAPARIN PER 10 MG: Performed by: INTERNAL MEDICINE

## 2021-10-05 PROCEDURE — 86140 C-REACTIVE PROTEIN: CPT | Performed by: INTERNAL MEDICINE

## 2021-10-05 PROCEDURE — 63710000001 INSULIN GLARGINE PER 5 UNITS: Performed by: INTERNAL MEDICINE

## 2021-10-05 PROCEDURE — 36415 COLL VENOUS BLD VENIPUNCTURE: CPT | Performed by: INTERNAL MEDICINE

## 2021-10-05 PROCEDURE — 63710000001 INSULIN LISPRO (HUMAN) PER 5 UNITS: Performed by: INTERNAL MEDICINE

## 2021-10-05 PROCEDURE — 85379 FIBRIN DEGRADATION QUANT: CPT | Performed by: INTERNAL MEDICINE

## 2021-10-05 PROCEDURE — 94799 UNLISTED PULMONARY SVC/PX: CPT

## 2021-10-05 PROCEDURE — 85027 COMPLETE CBC AUTOMATED: CPT | Performed by: INTERNAL MEDICINE

## 2021-10-05 PROCEDURE — 82728 ASSAY OF FERRITIN: CPT | Performed by: INTERNAL MEDICINE

## 2021-10-05 PROCEDURE — 82962 GLUCOSE BLOOD TEST: CPT

## 2021-10-05 PROCEDURE — 92526 ORAL FUNCTION THERAPY: CPT

## 2021-10-05 PROCEDURE — 80053 COMPREHEN METABOLIC PANEL: CPT | Performed by: INTERNAL MEDICINE

## 2021-10-05 PROCEDURE — 94760 N-INVAS EAR/PLS OXIMETRY 1: CPT

## 2021-10-05 PROCEDURE — 25010000002 DEXAMETHASONE PER 1 MG: Performed by: INTERNAL MEDICINE

## 2021-10-05 PROCEDURE — 94660 CPAP INITIATION&MGMT: CPT

## 2021-10-05 RX ORDER — INSULIN GLARGINE 100 [IU]/ML
15 INJECTION, SOLUTION SUBCUTANEOUS EVERY 12 HOURS SCHEDULED
Status: DISCONTINUED | OUTPATIENT
Start: 2021-10-05 | End: 2021-10-08

## 2021-10-05 RX ADMIN — ENOXAPARIN SODIUM 40 MG: 40 INJECTION SUBCUTANEOUS at 17:20

## 2021-10-05 RX ADMIN — IPRATROPIUM BROMIDE AND ALBUTEROL SULFATE 3 ML: 2.5; .5 SOLUTION RESPIRATORY (INHALATION) at 11:59

## 2021-10-05 RX ADMIN — LEVOTHYROXINE, LIOTHYRONINE 30 MG: 19; 4.5 TABLET ORAL at 06:38

## 2021-10-05 RX ADMIN — DEXTROMETHORPHAN POLISTIREX 60 MG: 30 SUSPENSION, EXTENDED RELEASE ORAL at 21:15

## 2021-10-05 RX ADMIN — IPRATROPIUM BROMIDE AND ALBUTEROL SULFATE 3 ML: 2.5; .5 SOLUTION RESPIRATORY (INHALATION) at 15:43

## 2021-10-05 RX ADMIN — LANSOPRAZOLE 30 MG: KIT at 06:38

## 2021-10-05 RX ADMIN — IPRATROPIUM BROMIDE AND ALBUTEROL SULFATE 3 ML: 2.5; .5 SOLUTION RESPIRATORY (INHALATION) at 08:29

## 2021-10-05 RX ADMIN — SENNOSIDES 1 TABLET: 8.6 TABLET, FILM COATED ORAL at 09:01

## 2021-10-05 RX ADMIN — ENOXAPARIN SODIUM 40 MG: 40 INJECTION SUBCUTANEOUS at 06:38

## 2021-10-05 RX ADMIN — INSULIN LISPRO 6 UNITS: 100 INJECTION, SOLUTION INTRAVENOUS; SUBCUTANEOUS at 17:19

## 2021-10-05 RX ADMIN — INSULIN LISPRO 4 UNITS: 100 INJECTION, SOLUTION INTRAVENOUS; SUBCUTANEOUS at 12:21

## 2021-10-05 RX ADMIN — CETIRIZINE HYDROCHLORIDE 10 MG: 10 TABLET ORAL at 09:01

## 2021-10-05 RX ADMIN — GABAPENTIN 200 MG: 100 CAPSULE ORAL at 16:36

## 2021-10-05 RX ADMIN — OXYMETAZOLINE HCL 2 SPRAY: 0.05 SPRAY NASAL at 09:01

## 2021-10-05 RX ADMIN — GABAPENTIN 100 MG: 100 CAPSULE ORAL at 09:01

## 2021-10-05 RX ADMIN — SENNOSIDES 1 TABLET: 8.6 TABLET, FILM COATED ORAL at 21:15

## 2021-10-05 RX ADMIN — POLYETHYLENE GLYCOL 3350 17 G: 17 POWDER, FOR SOLUTION ORAL at 09:01

## 2021-10-05 RX ADMIN — INSULIN GLARGINE 15 UNITS: 100 INJECTION, SOLUTION SUBCUTANEOUS at 21:15

## 2021-10-05 RX ADMIN — DEXAMETHASONE SODIUM PHOSPHATE 6 MG: 10 INJECTION INTRAMUSCULAR; INTRAVENOUS at 09:01

## 2021-10-05 RX ADMIN — INSULIN GLARGINE 10 UNITS: 100 INJECTION, SOLUTION SUBCUTANEOUS at 09:02

## 2021-10-05 RX ADMIN — OXYMETAZOLINE HCL 2 SPRAY: 0.05 SPRAY NASAL at 21:15

## 2021-10-05 RX ADMIN — DEXTROMETHORPHAN POLISTIREX 60 MG: 30 SUSPENSION, EXTENDED RELEASE ORAL at 10:05

## 2021-10-05 RX ADMIN — IPRATROPIUM BROMIDE AND ALBUTEROL SULFATE 3 ML: 2.5; .5 SOLUTION RESPIRATORY (INHALATION) at 20:27

## 2021-10-05 NOTE — PLAN OF CARE
Goal Outcome Evaluation:  Plan of Care Reviewed With: patient        Progress: improving  Outcome Summary: Pt has had no complaints this is shift. Tolerating all oral meds. Diet advance to soft texture/whole foods and thin liquids. Able to wean O2 down to 3L. BP trending low, losarten d/c'd. Assist x's 1. VSS. Will continue to monitor.

## 2021-10-05 NOTE — PROGRESS NOTES
[facility] INPATIENT PROGRESS NOTE         [address]    10/5/2021      PATIENT IDENTIFICATION:  Name: Brodie Bueno ADMIT: 2021   : 1960  PCP: Angie Olivarez MD    MRN: [id number] LOS: 13 days   AGE/SEX: 60 y.o. female  ROOM: [facility]                     LOS 13    Reason for visit: Hypoxemic respiratory failure with Covid pneumonia      SUBJECTIVE:      Resting comfortably.  On 5 L supplemental oxygen with saturations 99% at time of visit.  Decreased her to 3 L.  Tolerating tube feeds.  Speech therapy following and on modified diet.  I am seeing the patient for the first time today.  All patient problems are new to me.      Objective   OBJECTIVE:    Vital Sign Min/Max for last 24 hours  Temp  Min: 98 °F (36.7 °C)  Max: 98.2 °F (36.8 °C)   BP  Min: 87/53  Max: 105/80   Pulse  Min: 90  Max: 102   Resp  Min: 16  Max: 20   SpO2  Min: 93 %  Max: 95 %   No data recorded   Weight  Min: 94.5 kg (208 lb 6.4 oz)  Max: 94.5 kg (208 lb 6.4 oz)                         Body mass index is 34.68 kg/m².    Intake/Output Summary (Last 24 hours) at 10/5/2021 1304  Last data filed at 10/5/2021 1222  Gross per 24 hour   Intake 1332 ml   Output 1300 ml   Net 32 ml         Exam:  GEN:  No distress, appears stated age  EYES:   PERRL, anicteric sclerae  ENT:    External ears/nose normal, OP clear  NECK:  No adenopathy, midline trachea  LUNGS: Normal chest on inspection, palpation and auscultation  CV:  Normal S1S2, without murmur  ABD:  Nontender, nondistended, no hepatosplenomegaly, +BS  EXT:  No edema.  No cyanosis or clubbing.  No mottling and normal cap refill.    Assessment     Scheduled meds:  cetirizine, 10 mg, Oral, Daily  dexamethasone, 6 mg, Intravenous, Daily  dextromethorphan polistirex ER, 10 mL, Nasogastric, BID  enoxaparin, 40 mg, Subcutaneous, Q12H  gabapentin, 100 mg, Nasogastric, Daily  gabapentin, 200 mg, Nasogastric, Q PM  insulin glargine, 10 Units, Subcutaneous, Q12H  insulin  lispro, 0-9 Units, Subcutaneous, Q6H  ipratropium-albuterol, 3 mL, Nebulization, 4x Daily - RT  lansoprazole, 30 mg, Oral, QAM  oxymetazoline, 2 spray, Each Nare, BID  polyethylene glycol, 17 g, Oral, Daily  senna, 1 tablet, Oral, BID  Thyroid, 30 mg, Nasogastric, QAM AC      IV meds:                         Data Review:  Results from last 7 days   Lab Units 10/05/21  0511 10/04/21  0527 10/04/21  0527 10/03/21  0955 10/03/21  0955 10/02/21  0351 10/02/21  0351 10/01/21  0513 10/01/21  0513   SODIUM mmol/L 140  --  141  --  138  --  139  --  138   POTASSIUM mmol/L 3.9  --  4.0  --  4.2  --  4.2  --  4.3   CHLORIDE mmol/L 101  --  101  --  99  --  104  --  102   CO2 mmol/L 30.2*  --  27.8  --  29.6*  --  28.7  --  27.9   BUN mg/dL 18  --  23  --  23  --  24*  --  26*   CREATININE mg/dL 0.51*  --  0.63  --  0.59  --  0.49*  --  0.56*   GLUCOSE mg/dL 133*   < > 105*   < > 236*   < > 177*   < > 268*   CALCIUM mg/dL 8.7  --  8.5*  --  8.8  --  8.2*  --  8.5*    < > = values in this interval not displayed.         Estimated Creatinine Clearance: 130 mL/min (A) (by C-G formula based on SCr of 0.51 mg/dL (L)).  Results from last 7 days   Lab Units 10/05/21  0512 10/04/21  0527 10/03/21  0955 10/02/21  0351 10/01/21  0513   WBC 10*3/mm3 6.34 6.39 7.36 6.28 5.87   HEMOGLOBIN g/dL 12.2 12.7 13.0 12.4 12.5   PLATELETS 10*3/mm3 161 187 262 270 322         Results from last 7 days   Lab Units 10/05/21  0511 10/04/21  0527 10/03/21  0955 10/02/21  0351 10/01/21  0513   ALT (SGPT) U/L 55* 41* 36* 33 26   AST (SGOT) U/L 27 29 26 21 21     Results from last 7 days   Lab Units 09/29/21  0536   PH, ARTERIAL pH units 7.417   PO2 ART mm Hg 68.6*   PCO2, ARTERIAL mm Hg 44.4   HCO3 ART mmol/L 28.6*     Results from last 7 days   Lab Units 10/04/21  0527 10/02/21  0351 09/30/21  0423   PROCALCITONIN ng/mL 0.05 0.05 0.05      COVID LABS:  Results From Last 14 Days   Lab Units 10/05/21  0511 10/04/21  0527 10/03/21  0955 10/02/21  1342  10/02/21  0351 10/01/21  0513 09/30/21  0423 09/29/21  0528 09/28/21  0433 09/27/21  0401 09/26/21  0417 09/26/21  0417 09/25/21  0608 09/25/21  0346 09/24/21  0537 09/23/21  0442 09/22/21  1538   PROBNP pg/mL  --   --   --   --   --   --   --   --   --   --   --   --   --   --   --   --  103.7   CK TOTAL U/L  --   --   --   --   --   --   --   --   --  34  --  48  --  51   < > 153  --    CRP mg/dL <0.30 <0.30 <0.30   < >  --   --   --   --   --  3.12*   < > 4.89*   < > 9.80*   < > 24.67*  --    D DIMER QUANT MCGFEU/mL 0.55*  --  1.06*  --   --  0.60*  --    < >   < > 0.61*  --   --    < >  --   --  1.66*  --    FERRITIN ng/mL 646.00* 728.00* 725.00*   < >  --   --   --   --   --  1,056.00*   < > 1,212.00*   < > 1,557.00*   < > 1,121.00*  --    LACTATE mmol/L  --   --   --   --   --   --   --   --   --   --   --   --   --   --   --   --  1.4   LDH U/L  --   --   --   --   --   --   --   --   --   --   --   --   --   --   --  517*  --    PROCALCITONIN ng/mL  --  0.05  --   --  0.05  --  0.05  --    < >  --   --  0.09  --   --    < >  --  0.16   PROTIME Seconds  --   --   --   --   --   --   --   --   --   --   --   --   --   --   --   --  14.9*   INR   --   --   --   --   --   --   --   --   --   --   --   --   --   --   --   --  1.19*   TROPONIN T ng/mL  --   --   --   --   --   --   --   --   --   --   --   --   --   --   --   --  <0.010    < > = values in this interval not displayed.     \     Glucose   Date/Time Value Ref Range Status   10/05/2021 1109 249 (H) 70 - 130 mg/dL Final     Comment:     Meter: [id number] : [id number] Conrad CASTRO   10/05/2021 0638 121 70 - 130 mg/dL Final     Comment:     Meter: [id number] : [id number] Raymundo CASTRO   10/05/2021 0046 149 (H) 70 - 130 mg/dL Final     Comment:     Meter: [id number] : [id number] Marie Saniya RN   10/04/2021 2051 175 (H) 70 - 130 mg/dL Final     Comment:     Meter: [id number] : [id number] Raymundo CASTRO   10/04/2021 1658 223 (H)  70 - 130 mg/dL Final     Comment:     Meter: [id number] : [id number] Conrad Edgar NA   10/04/2021 1127 220 (H) 70 - 130 mg/dL Final     Comment:     Meter: [id number] : [id number] Conrad Edgar NA   10/04/2021 0600 108 70 - 130 mg/dL Final     Comment:     Meter: [id number] : [id number] Ethan Ponce RN     Most recent chest x-ray 10/2 reviewed: Scattered infiltrates consistent with Covid pneumonia.  Mildly improved compared to previous imaging.      Microbiology reviewed                Active Hospital Problems    Diagnosis  POA   • Hypothyroidism (acquired) [E03.9]  Yes   • Acute hypoxemic respiratory failure due to COVID-19 (HCC) [U07.1, J96.01]  Yes   • Obesity (BMI 30-39.9) [E66.9]  Yes   • Type II diabetes mellitus, uncontrolled (HCC) [E11.65]  Yes   • Pneumonia due to COVID-19 virus [U07.1, J12.82]  Yes   • Hypertension [I10]  Yes   • JENAE (obstructive sleep apnea) [G47.33]  Yes   • Gastroesophageal reflux disease [K21.9]  Yes      Resolved Hospital Problems   No resolved problems to display.         ASSESSMENT:    Acute hypoxic respiratory failure  Noninvasive ventilator dependent -9/24  Acute Covid pneumonia  Elevated inflammatory markers s/p Actemra-9/24  Hypernatremia  JENAE on CPAP  Morbid obesity        PLAN:    Encourage pulmonary toilet.  Weaning steroids.  Weaning oxygen as able.  NIPPV for work of breathing and as needed for sleep apnea.  Nebulized bronchodilators to help with clearance.  Control glucose.  Control blood pressure.  Speech therapy following for dysphagia.        Pierre Carr MD  Pulmonary and Critical Care Medicine  [facility], [facility]  10/5/2021    13:04 EDT

## 2021-10-05 NOTE — THERAPY RE-EVALUATION
Acute Care - Speech Language Pathology   Swallow Re-Evaluation [facility]     Patient Name: Brodie Bueno  : 1960  MRN: [id number]  Today's Date: 10/5/2021               Admit Date: 2021    Visit Dx:     ICD-10-CM ICD-9-CM   1. Pneumonia due to COVID-19 virus  U07.1 480.8    J12.82 079.89   2. Hypoxia  R09.02 799.02   3. Hypokalemia  E87.6 276.8     Patient Active Problem List   Diagnosis   • Gastroesophageal reflux disease   • Hypertension   • JENAE (obstructive sleep apnea)   • BESS (dyspnea on exertion)   • Fatigue   • Clinical diagnosis of COVID-19   • Pneumonia due to COVID-19 virus   • Acute hypoxemic respiratory failure due to COVID-19 (HCC)   • Obesity (BMI 30-39.9)   • Type II diabetes mellitus, uncontrolled (HCC)   • Hypothyroidism (acquired)   • Oropharyngeal dysphagia     Past Medical History:   Diagnosis Date   • Cough    • Fibromyalgia    • Frequent urination    • Gallbladder disease    • GERD (gastroesophageal reflux disease)    • Hemorrhoids    • Hyperlipidemia    • Hypersomnolence    • Hypertension    • Nasal congestion    • JENAE (obstructive sleep apnea)     mild-severe   • Postnasal drip    • Sleep apnea    • Snoring    • Subcutaneous abscess    • Weight gain      Past Surgical History:   Procedure Laterality Date   • CHOLECYSTECTOMY     • COLONOSCOPY  approx     normal per patient   • LYMPH NODE BIOPSY     • UPPER GASTROINTESTINAL ENDOSCOPY  approx     gerd per patient       SLP Recommendation and Plan  Recommend: thin liquids and soft to chew diet. Medications: whole with puree (pt prefers pudding). Compensations: small bites/sips, upright for all PO, reduce distractions while eating.     SLP available for further interventions pending clinical course. Suspect dysphagia was exacerbated by increased oxygen requirements.         SWALLOW EVALUATION (last 72 hours)      SLP Adult Swallow Evaluation     Row Name 10/05/21 1600       Rehab Evaluation    Document Type   re-evaluation  -AB    Subjective Information  no complaints  -AB    Patient Observations  alert;cooperative;agree to therapy  -AB    Patient/Family/Caregiver Comments/Observations  pt is cooperative. mildly confused, cannot recall what she had for lunch despite currently consuming when SLP entered  -AB    Care Plan Review  evaluation/treatment results reviewed;care plan/treatment goals reviewed;risks/benefits reviewed;current/potential barriers reviewed;patient/other agree to care plan  -AB    Patient Effort  good  -AB    Symptoms Noted During/After Treatment  none  -AB       General Information    Patient Profile Reviewed  yes  -AB       Pain    Additional Documentation  Pain Scale: Numbers Pre/Post-Treatment (Group)  -AB       Pain Scale: Numbers Pre/Post-Treatment    Pretreatment Pain Rating  0/10 - no pain  -AB    Posttreatment Pain Rating  0/10 - no pain  -AB      User Key  (r) = Recorded By, (t) = Taken By, (c) = Cosigned By    Initials Name Effective Dates    AB Jumana Jesus, MS CCC-SLP 08/01/21 -           EDUCATION  The patient has been educated in the following areas:   Dysphagia (Swallowing Impairment) Modified Diet Instruction.       SLP GOALS     Row Name 10/05/21 1600       Oral Nutrition/Hydration Goal 1 (SLP)    Oral Nutrition/Hydration Goal 1, SLP  Tolerate diet without overt/clinical s/s of aspiration  -AB    Time Frame (Oral Nutrition/Hydration Goal 1, SLP)  by discharge  -AB    Barriers (Oral Nutrition/Hydration Goal 1, SLP)  RE-EVAL: Bedside swallow re-evaluation completed. Pt is pleasant, cooperative. She endorses no current complaints regarding current diet. O2 needs decreased from 60L at time of evaluation to 5L this date. She has just completed lunch with sweet potatoes, pork, and green beans, NTL. Amenable to trials of thin liquids and regular solids for potential upgrade. Mastication timely and efficient. Pt with complaints of dry/crunchy foods requiring thin water to form bolus and  clear from the oral cavity. Mild distractibility with cues required to attend to PO. No significant residuals. Pharyngeal phase with no overt s/s aspiration. Palpation suggests timely initiation. O2 sats maintained at  throughout.   -AB    Progress/Outcomes (Oral Nutrition/Hydration Goal 1, SLP)  goal ongoing  -AB       Oral Nutrition/Hydration Goal 2 (SLP)    Oral Nutrition/Hydration Goal 2, SLP  Monitor for need for VFSS/FEES  -AB    Time Frame (Oral Nutrition/Hydration Goal 2, SLP)  by discharge  -AB    Progress/Outcomes (Oral Nutrition/Hydration Goal 2, SLP)  goal met  -AB      User Key  (r) = Recorded By, (t) = Taken By, (c) = Cosigned By    Initials Name Provider Type    AB Jumana Jesus MS CCC-SLP Speech and Language Pathologist           SLP Outcome Measures (last 72 hours)      SLP Outcome Measures     Row Name 10/02/21 1700             SLP Outcome Measures    Outcome Measure Used?  Adult NOMS  -NR         Adult FCM Scores    FCM Chosen  Swallowing  -NR      Swallowing FCM Score  4  -NR        User Key  (r) = Recorded By, (t) = Taken By, (c) = Cosigned By    Initials Name Effective Dates    NR Amber Brown MA,CCC-SLP 06/16/21 -            Time Calculation:   Time Calculation- SLP     Row Name 10/05/21 1605             Time Calculation- SLP    SLP Received On  10/05/21  -AB        User Key  (r) = Recorded By, (t) = Taken By, (c) = Cosigned By    Initials Name Provider Type    AB Jumana Jesus, MS CCC-SLP Speech and Language Pathologist          Therapy Charges for Today     Code Description Service Date Service Provider Modifiers Qty    [id number]  ST TREATMENT SWALLOW 4 10/5/2021 Jumana Jesus, MS CCC-SLP GN 1        PPE:  Patient was not wearing a face mask during this therapy encounter. Therapist used appropriate personal protective equipment including gown, eye protection, mask and gloves.  Mask used was N95/duckbill. Appropriate PPE was worn during the entire therapy session. The  patient coughed during this evaluation. Therapist was within 6 feet for 15 minutes or more during the evaluation. Hand hygiene was completed before and after therapy session. Patient is in enhanced droplet precautions.            Jumana Jesus MS CCC-SLP  10/5/2021

## 2021-10-05 NOTE — PROGRESS NOTES
Name: Brodie Bueno ADMIT: 2021   : 1960  PCP: Angie Olivarez MD    MRN: [id number] LOS: 13 days   AGE/SEX: 60 y.o. female  ROOM: [facility]     Subjective   Subjective   CC: dyspnea  No acute events. Dyspnea continues to improve. Taking more PO. Tolerating tube feeds. No CP/f/c/n/v/d.    Objective   Objective   Vital Signs  Temp:  [97.5 °F (36.4 °C)-98.2 °F (36.8 °C)] 97.5 °F (36.4 °C)  Heart Rate:  [90-98] 98  Resp:  [16-20] 18  BP: ()/(53-80) 109/70  SpO2:  [93 %-96 %] 96 %  on  Flow (L/min):  [3-6] 3;   Device (Oxygen Therapy): nasal cannula  Body mass index is 34.68 kg/m².  Physical Exam  Vitals and nursing note reviewed.   Constitutional:       General: She is not in acute distress.     Appearance: She is not toxic-appearing or diaphoretic.   HENT:      Head: Normocephalic and atraumatic.      Nose: Nose normal.      Mouth/Throat:      Mouth: Mucous membranes are moist.      Pharynx: Oropharynx is clear.   Eyes:      Extraocular Movements: Extraocular movements intact.      Conjunctiva/sclera: Conjunctivae normal.      Pupils: Pupils are equal, round, and reactive to light.   Cardiovascular:      Rate and Rhythm: Normal rate and regular rhythm.      Pulses: Normal pulses.   Pulmonary:      Effort: Pulmonary effort is normal.      Breath sounds: Examination of the right-lower field reveals decreased breath sounds. Examination of the left-lower field reveals decreased breath sounds. Decreased breath sounds present.   Abdominal:      General: Bowel sounds are normal.      Tenderness: There is no abdominal tenderness.   Musculoskeletal:         General: Swelling (trace BLE) present. No tenderness.      Cervical back: Normal range of motion and neck supple.   Skin:     General: Skin is warm and dry.      Capillary Refill: Capillary refill takes less than 2 seconds.   Neurological:      General: No focal deficit present.      Mental Status: She is alert and oriented to person, place, and  time.   Psychiatric:         Mood and Affect: Mood normal.         Behavior: Behavior normal.     Results Review     I reviewed the patient's new clinical results.  I reviewed the patient's telemetry.  Results from last 7 days   Lab Units 10/05/21  0512 10/04/21  0527 10/03/21  0955 10/02/21  0351   WBC 10*3/mm3 6.34 6.39 7.36 6.28   HEMOGLOBIN g/dL 12.2 12.7 13.0 12.4   PLATELETS 10*3/mm3 161 187 262 270     Results from last 7 days   Lab Units 10/05/21  0511 10/04/21  0527 10/03/21  0955 10/02/21  0351   SODIUM mmol/L 140 141 138 139   POTASSIUM mmol/L 3.9 4.0 4.2 4.2   CHLORIDE mmol/L 101 101 99 104   CO2 mmol/L 30.2* 27.8 29.6* 28.7   BUN mg/dL 18 23 23 24*   CREATININE mg/dL 0.51* 0.63 0.59 0.49*   GLUCOSE mg/dL 133* 105* 236* 177*   Estimated Creatinine Clearance: 130 mL/min (A) (by C-G formula based on SCr of 0.51 mg/dL (L)).  Results from last 7 days   Lab Units 10/05/21  0511 10/04/21  0527 10/03/21  0955 10/02/21  0351   ALBUMIN g/dL 3.10* 2.90* 3.20* 2.80*   BILIRUBIN mg/dL 0.4 0.4 0.5 0.5   ALK PHOS U/L 57 55 59 51   AST (SGOT) U/L 27 29 26 21   ALT (SGPT) U/L 55* 41* 36* 33     Results from last 7 days   Lab Units 10/05/21  0511 10/04/21  0527 10/03/21  0955 10/02/21  0351   CALCIUM mg/dL 8.7 8.5* 8.8 8.2*   ALBUMIN g/dL 3.10* 2.90* 3.20* 2.80*     Results from last 7 days   Lab Units 10/04/21  0527 10/02/21  0351 09/30/21  0423   PROCALCITONIN ng/mL 0.05 0.05 0.05     COVID19   Date Value Ref Range Status   09/22/2021 Detected (C) Not Detected - Ref. Range Final     Glucose   Date/Time Value Ref Range Status   10/05/2021 1617 285 (H) 70 - 130 mg/dL Final     Comment:     Meter: [id number] : [id number] [facility]   10/05/2021 1109 249 (H) 70 - 130 mg/dL Final     Comment:     Meter: [id number] : [id number] [facility]   10/05/2021 0638 121 70 - 130 mg/dL Final     Comment:     Meter: [id number] : [id number] Fonseca Melany    10/05/2021 0046 149 (H) 70 - 130 mg/dL Final     Comment:      Meter: [id number] : [id number] Marie Kothari RN   10/04/2021 2051 175 (H) 70 - 130 mg/dL Final     Comment:     Meter: [id number] : [id number] Raymundo Mosley NA   10/04/2021 1658 223 (H) 70 - 130 mg/dL Final     Comment:     Meter: [id number] : [id number] Conrad Edgar NA   10/04/2021 1127 220 (H) 70 - 130 mg/dL Final     Comment:     Meter: [id number] : [id number] Conrad Edgar NA       XR Chest 1 View  ONE VIEW PORTABLE CHEST AT 2:13 PM     HISTORY: Respiratory failure. Covid 19 pneumonia.     FINDINGS: The lungs are moderately expanded with scattered areas of  pneumonia likely related to Covid 19 pneumonia and showing slight  improvement from 09/23/2021. The heart remains slightly enlarged. A  feeding tube ends below the diaphragm.     This report was finalized on 10/2/2021 2:32 PM by Dr. Laith Balderas M.D.       Scheduled Medications  cetirizine, 10 mg, Oral, Daily  dexamethasone, 6 mg, Intravenous, Daily  dextromethorphan polistirex ER, 10 mL, Nasogastric, BID  enoxaparin, 40 mg, Subcutaneous, Q12H  gabapentin, 100 mg, Nasogastric, Daily  gabapentin, 200 mg, Nasogastric, Q PM  insulin glargine, 15 Units, Subcutaneous, Q12H  insulin lispro, 0-9 Units, Subcutaneous, Q6H  ipratropium-albuterol, 3 mL, Nebulization, 4x Daily - RT  lansoprazole, 30 mg, Oral, QAM  oxymetazoline, 2 spray, Each Nare, BID  polyethylene glycol, 17 g, Oral, Daily  senna, 1 tablet, Oral, BID  Thyroid, 30 mg, Nasogastric, QAM AC    Infusions   Diet  Diet Soft Texture; Whole Foods; Thin; Consistent Carbohydrate       Assessment/Plan     Active Hospital Problems    Diagnosis  POA   • Oropharyngeal dysphagia [R13.12]  Yes   • Hypothyroidism (acquired) [E03.9]  Yes   • Acute hypoxemic respiratory failure due to COVID-19 (HCC) [U07.1, J96.01]  Yes   • Obesity (BMI 30-39.9) [E66.9]  Yes   • Type 2 diabetes mellitus with diabetic polyneuropathy, without long-term current use of insulin (HCC) [E11.42]  Yes   • Pneumonia due to  COVID-19 virus [U07.1, J12.82]  Yes   • Hypertension [I10]  Yes   • JENAE (obstructive sleep apnea) [G47.33]  Yes   • Gastroesophageal reflux disease [K21.9]  Yes      Resolved Hospital Problems   No resolved problems to display.   COVID-19 Pneumonia with Acute Hypoxic Respiratory Failure  - patient is unvaccinated  - s/p remdesivir and tocilizumab  - continue decadron  - follow inflammatory markers  - encourage pulmonary toilet and wean oxygen as tolerated  - appreciate pulmonology recs and critical care management    JENAE  - on BiPAP at night    Type 2 DM  - hyperglycemia exacerbated by steroids-these are being weaned  - continue lantus but will increase to 15 units Q12H  - cover with ssi/hypoglycemia protocol  - has peripheral neuropathy on gabapentin    Oropharyngeal Dysphagia  - SLP has upgraded diet and she is taking more PO  - will hold tube feeds and see how she does-hopefully we can get the coretrak out tomorrow    HTN  - BP is on the low end  - hold losartan    GERD  - on lansoprazole    Hypothyroidism  - continue [place] Thyroid    Morbid Obesity   - Complicating above    Lovenox 40mg SC Q12H for DVT prophylaxis.  Full code.  Discussed with patient and nursing staff.  Anticipate discharge to SNU facility in 1-2 days.      Nii Cardenas MD  [facility]  10/05/21  17:28 EDT

## 2021-10-05 NOTE — PLAN OF CARE
Goal Outcome Evaluation:  Plan of Care Reviewed With: patient  Progress: improving  Outcome Summary: All needs met. Isolation maintained. Assist x 1. Diabetasource continuous tube feeds remain at goal rate and flush, bag and tubeing changed this shift. Mechanical/soft no mixed nectar thick liquid diet w/o straws. Takes meds whole in pudding. Requested several snacks throughout the night and ate 100% of them. VSS. A&Ox4. On 6L HFNC, sats maintaining 90%. NSR on the monitor. No complaints. Blood glucose monitoring. Will CTM.

## 2021-10-05 NOTE — PLAN OF CARE
Goal Outcome Evaluation:  Plan of Care Reviewed With: patient        Progress: improving  Outcome Summary: Bedside swallow re-evaluation completed. Pt is pleasant, cooperative. She endorses no current complaints regarding current diet. O2 needs decreased from 60L at time of evaluation to 5L this date. She has just completed lunch with sweet potatoes, pork, and green beans, NTL. Amenable to trials of thin liquids and regular solids for potential upgrade. Mastication timely and efficient. Pt with complaints of dry/crunchy foods requiring thin water to form bolus and clear from the oral cavity. Mild distractibility with cues required to attend to PO. No significant residuals. Pharyngeal phase with no overt s/s aspiration. Palpation suggests timely initiation. O2 sats maintained at  throughout.     Recommend: thin liquids and soft to chew diet. Medications: whole with puree (pt prefers pudding). Compensations: small bites/sips, upright for all PO, reduce distractions while eating.     SLP available for further interventions pending clinical course. Suspect dysphagia was exacerbated by increased oxygen requirements.

## 2021-10-06 LAB
ALBUMIN SERPL-MCNC: 3.4 G/DL (ref 3.5–5.2)
ALBUMIN/GLOB SERPL: 1.5 G/DL
ALP SERPL-CCNC: 52 U/L (ref 39–117)
ALT SERPL W P-5'-P-CCNC: 79 U/L (ref 1–33)
ANION GAP SERPL CALCULATED.3IONS-SCNC: 9.1 MMOL/L (ref 5–15)
AST SERPL-CCNC: 39 U/L (ref 1–32)
BILIRUB SERPL-MCNC: 0.6 MG/DL (ref 0–1.2)
BUN SERPL-MCNC: 15 MG/DL (ref 8–23)
BUN/CREAT SERPL: 23.4 (ref 7–25)
CALCIUM SPEC-SCNC: 8.9 MG/DL (ref 8.6–10.5)
CHLORIDE SERPL-SCNC: 102 MMOL/L (ref 98–107)
CO2 SERPL-SCNC: 29.9 MMOL/L (ref 22–29)
CREAT SERPL-MCNC: 0.64 MG/DL (ref 0.57–1)
CRP SERPL-MCNC: <0.3 MG/DL (ref 0–0.5)
DEPRECATED RDW RBC AUTO: 43.9 FL (ref 37–54)
ERYTHROCYTE [DISTWIDTH] IN BLOOD BY AUTOMATED COUNT: 14 % (ref 12.3–15.4)
FERRITIN SERPL-MCNC: 712 NG/ML (ref 13–150)
GFR SERPL CREATININE-BSD FRML MDRD: 114 ML/MIN/1.73
GLOBULIN UR ELPH-MCNC: 2.2 GM/DL
GLUCOSE BLDC GLUCOMTR-MCNC: 107 MG/DL (ref 70–130)
GLUCOSE BLDC GLUCOMTR-MCNC: 111 MG/DL (ref 70–130)
GLUCOSE BLDC GLUCOMTR-MCNC: 129 MG/DL (ref 70–130)
GLUCOSE BLDC GLUCOMTR-MCNC: 152 MG/DL (ref 70–130)
GLUCOSE SERPL-MCNC: 86 MG/DL (ref 65–99)
HCT VFR BLD AUTO: 37.6 % (ref 34–46.6)
HGB BLD-MCNC: 12.7 G/DL (ref 12–15.9)
MCH RBC QN AUTO: 30.3 PG (ref 26.6–33)
MCHC RBC AUTO-ENTMCNC: 33.8 G/DL (ref 31.5–35.7)
MCV RBC AUTO: 89.7 FL (ref 79–97)
PLATELET # BLD AUTO: 164 10*3/MM3 (ref 140–450)
PMV BLD AUTO: 11.3 FL (ref 6–12)
POTASSIUM SERPL-SCNC: 3.7 MMOL/L (ref 3.5–5.2)
PROCALCITONIN SERPL-MCNC: 0.05 NG/ML (ref 0–0.25)
PROT SERPL-MCNC: 5.6 G/DL (ref 6–8.5)
RBC # BLD AUTO: 4.19 10*6/MM3 (ref 3.77–5.28)
SODIUM SERPL-SCNC: 141 MMOL/L (ref 136–145)
WBC # BLD AUTO: 6.21 10*3/MM3 (ref 3.4–10.8)

## 2021-10-06 PROCEDURE — 94760 N-INVAS EAR/PLS OXIMETRY 1: CPT

## 2021-10-06 PROCEDURE — 82962 GLUCOSE BLOOD TEST: CPT

## 2021-10-06 PROCEDURE — 25010000002 DEXAMETHASONE PER 1 MG: Performed by: INTERNAL MEDICINE

## 2021-10-06 PROCEDURE — 25010000002 ENOXAPARIN PER 10 MG: Performed by: INTERNAL MEDICINE

## 2021-10-06 PROCEDURE — 63710000001 INSULIN GLARGINE PER 5 UNITS: Performed by: INTERNAL MEDICINE

## 2021-10-06 PROCEDURE — 94799 UNLISTED PULMONARY SVC/PX: CPT

## 2021-10-06 PROCEDURE — 94660 CPAP INITIATION&MGMT: CPT

## 2021-10-06 PROCEDURE — 86140 C-REACTIVE PROTEIN: CPT | Performed by: INTERNAL MEDICINE

## 2021-10-06 PROCEDURE — 85027 COMPLETE CBC AUTOMATED: CPT | Performed by: INTERNAL MEDICINE

## 2021-10-06 PROCEDURE — 82728 ASSAY OF FERRITIN: CPT | Performed by: INTERNAL MEDICINE

## 2021-10-06 PROCEDURE — 84145 PROCALCITONIN (PCT): CPT | Performed by: INTERNAL MEDICINE

## 2021-10-06 PROCEDURE — 80053 COMPREHEN METABOLIC PANEL: CPT | Performed by: INTERNAL MEDICINE

## 2021-10-06 PROCEDURE — 63710000001 INSULIN LISPRO (HUMAN) PER 5 UNITS: Performed by: NURSE PRACTITIONER

## 2021-10-06 RX ORDER — INSULIN LISPRO 100 [IU]/ML
0-9 INJECTION, SOLUTION INTRAVENOUS; SUBCUTANEOUS
Status: DISCONTINUED | OUTPATIENT
Start: 2021-10-06 | End: 2021-10-09 | Stop reason: HOSPADM

## 2021-10-06 RX ADMIN — INSULIN LISPRO 2 UNITS: 100 INJECTION, SOLUTION INTRAVENOUS; SUBCUTANEOUS at 21:23

## 2021-10-06 RX ADMIN — IPRATROPIUM BROMIDE AND ALBUTEROL SULFATE 3 ML: 2.5; .5 SOLUTION RESPIRATORY (INHALATION) at 22:31

## 2021-10-06 RX ADMIN — IPRATROPIUM BROMIDE AND ALBUTEROL SULFATE 3 ML: 2.5; .5 SOLUTION RESPIRATORY (INHALATION) at 16:28

## 2021-10-06 RX ADMIN — LEVOTHYROXINE, LIOTHYRONINE 30 MG: 19; 4.5 TABLET ORAL at 06:27

## 2021-10-06 RX ADMIN — IPRATROPIUM BROMIDE AND ALBUTEROL SULFATE 3 ML: 2.5; .5 SOLUTION RESPIRATORY (INHALATION) at 08:20

## 2021-10-06 RX ADMIN — SENNOSIDES 1 TABLET: 8.6 TABLET, FILM COATED ORAL at 09:01

## 2021-10-06 RX ADMIN — OXYMETAZOLINE HCL 2 SPRAY: 0.05 SPRAY NASAL at 09:02

## 2021-10-06 RX ADMIN — DEXTROMETHORPHAN POLISTIREX 60 MG: 30 SUSPENSION, EXTENDED RELEASE ORAL at 21:18

## 2021-10-06 RX ADMIN — IPRATROPIUM BROMIDE AND ALBUTEROL SULFATE 3 ML: 2.5; .5 SOLUTION RESPIRATORY (INHALATION) at 11:45

## 2021-10-06 RX ADMIN — ENOXAPARIN SODIUM 40 MG: 40 INJECTION SUBCUTANEOUS at 17:39

## 2021-10-06 RX ADMIN — INSULIN GLARGINE 15 UNITS: 100 INJECTION, SOLUTION SUBCUTANEOUS at 09:02

## 2021-10-06 RX ADMIN — ENOXAPARIN SODIUM 40 MG: 40 INJECTION SUBCUTANEOUS at 06:27

## 2021-10-06 RX ADMIN — GABAPENTIN 200 MG: 100 CAPSULE ORAL at 17:39

## 2021-10-06 RX ADMIN — POLYETHYLENE GLYCOL 3350 17 G: 17 POWDER, FOR SOLUTION ORAL at 09:01

## 2021-10-06 RX ADMIN — DEXTROMETHORPHAN POLISTIREX 60 MG: 30 SUSPENSION, EXTENDED RELEASE ORAL at 09:01

## 2021-10-06 RX ADMIN — SENNOSIDES 1 TABLET: 8.6 TABLET, FILM COATED ORAL at 21:18

## 2021-10-06 RX ADMIN — LANSOPRAZOLE 30 MG: KIT at 06:26

## 2021-10-06 RX ADMIN — DEXAMETHASONE SODIUM PHOSPHATE 6 MG: 10 INJECTION INTRAMUSCULAR; INTRAVENOUS at 09:01

## 2021-10-06 RX ADMIN — GABAPENTIN 100 MG: 100 CAPSULE ORAL at 09:01

## 2021-10-06 RX ADMIN — CETIRIZINE HYDROCHLORIDE 10 MG: 10 TABLET ORAL at 09:01

## 2021-10-06 RX ADMIN — INSULIN GLARGINE 15 UNITS: 100 INJECTION, SOLUTION SUBCUTANEOUS at 21:24

## 2021-10-06 NOTE — CASE MANAGEMENT/SOCIAL WORK
Continued Stay Note  [facility]     Patient Name: Brodie Bueno  MRN: [id number]  Today's Date: 10/6/2021    Admit Date: 9/22/2021    Discharge Plan     Row Name 10/06/21 1411       Plan    Plan  [facility]-bed available Saturday    Patient/Family in Agreement with Plan  yes    Plan Comments  Spoke with [facility].  [facility] accepted and will have a bed on Saturday.  Called and spoke with daughter, Jillian.  She is agreeable as long as the patient is in agreement.  Attempted to call into patient's room, but she did not answer.  CCP will follow. Yoselin Callahan RN        Discharge Codes    No documentation.             Yoselin Callahan RN

## 2021-10-06 NOTE — CASE MANAGEMENT/SOCIAL WORK
Continued Stay Note  [facility]     Patient Name: Brodie Bueno  MRN: [id number]  Today's Date: 10/6/2021    Admit Date: 9/22/2021    Discharge Plan     Row Name 10/06/21 1001       Plan    Plan Comments  Per Pedro/Deniz, patient is out of COVID window so [facility] will not be able to accept her on their COVID unit.  Pedro spoke with the daughter and she is agreeable to [facility], [facility], or [facility].  Pedro to check on bed availability at these facilities and will follow up with CCP. Yoselin Callahan RN        Discharge Codes    No documentation.             Yoselin Callahan RN

## 2021-10-06 NOTE — PLAN OF CARE
Goal Outcome Evaluation:  Plan of Care Reviewed With: patient  Progress: improving  Outcome Summary: All needs met. Assist x 1. Purewick in place and care provided. Turns. Soft texture diet/whole foods w/ thin liquids. Meds whole in pudding. VSS. Mental status at baseline. Remains on 2l NC, sats maintaining 90%. NSR on the monitor. No complaints. Will CTM.

## 2021-10-06 NOTE — PROGRESS NOTES
[facility] INPATIENT PROGRESS NOTE         [address]    10/6/2021      PATIENT IDENTIFICATION:  Name: Brodie Bueno ADMIT: 2021   : 1960  PCP: Angie Olivarez MD    MRN: [id number] LOS: 14 days   AGE/SEX: 60 y.o. female  ROOM: [facility]                     LOS 14    Reason for visit: Hypoxemic respiratory failure with Covid pneumonia      SUBJECTIVE:      Resting comfortably.  Says that she feels that her breathing has significantly improved.  Down to 2 L supplemental oxygen.  Taking p.o. diet okay and no longer tube feeds.  Hopefully feeding tube can be removed today.      Objective   OBJECTIVE:    Vital Sign Min/Max for last 24 hours  Temp  Min: 95.7 °F (35.4 °C)  Max: 97.5 °F (36.4 °C)   BP  Min: 90/58  Max: 120/78   Pulse  Min: 91  Max: 104   Resp  Min: 16  Max: 18   SpO2  Min: 93 %  Max: 96 %   No data recorded   Weight  Min: 93.6 kg (206 lb 6.4 oz)  Max: 93.6 kg (206 lb 6.4 oz)                         Body mass index is 34.35 kg/m².    Intake/Output Summary (Last 24 hours) at 10/6/2021 0904  Last data filed at 10/6/2021 0900  Gross per 24 hour   Intake 1751 ml   Output 1650 ml   Net 101 ml         Exam:  GEN:  No distress, appears stated age  EYES:   PERRL, anicteric sclerae  ENT:    External ears/nose normal, OP clear  NECK:  No adenopathy, midline trachea  LUNGS: Normal chest on inspection, palpation and auscultation  CV:  Normal S1S2, without murmur  ABD:  Nontender, nondistended, no hepatosplenomegaly, +BS  EXT:  No edema.  No cyanosis or clubbing.  No mottling and normal cap refill.    Assessment     Scheduled meds:  cetirizine, 10 mg, Oral, Daily  dexamethasone, 6 mg, Intravenous, Daily  dextromethorphan polistirex ER, 10 mL, Nasogastric, BID  enoxaparin, 40 mg, Subcutaneous, Q12H  gabapentin, 100 mg, Nasogastric, Daily  gabapentin, 200 mg, Nasogastric, Q PM  insulin glargine, 15 Units, Subcutaneous, Q12H  insulin lispro, 0-9 Units, Subcutaneous,  Q6H  ipratropium-albuterol, 3 mL, Nebulization, 4x Daily - RT  lansoprazole, 30 mg, Oral, QAM  polyethylene glycol, 17 g, Oral, Daily  senna, 1 tablet, Oral, BID  Thyroid, 30 mg, Nasogastric, QAM AC      IV meds:                         Data Review:  Results from last 7 days   Lab Units 10/06/21  0705 10/05/21  0511 10/05/21  0511 10/04/21  0527 10/04/21  0527 10/03/21  0955 10/03/21  0955 10/02/21  0351 10/02/21  0351   SODIUM mmol/L 141  --  140  --  141  --  138  --  139   POTASSIUM mmol/L 3.7  --  3.9  --  4.0  --  4.2  --  4.2   CHLORIDE mmol/L 102  --  101  --  101  --  99  --  104   CO2 mmol/L 29.9*  --  30.2*  --  27.8  --  29.6*  --  28.7   BUN mg/dL 15  --  18  --  23  --  23  --  24*   CREATININE mg/dL 0.64  --  0.51*  --  0.63  --  0.59  --  0.49*   GLUCOSE mg/dL 86   < > 133*   < > 105*   < > 236*   < > 177*   CALCIUM mg/dL 8.9  --  8.7  --  8.5*  --  8.8  --  8.2*    < > = values in this interval not displayed.         Estimated Creatinine Clearance: 103 mL/min (by C-G formula based on SCr of 0.64 mg/dL).  Results from last 7 days   Lab Units 10/06/21  0705 10/05/21  0512 10/04/21  0527 10/03/21  0955 10/02/21  0351   WBC 10*3/mm3 6.21 6.34 6.39 7.36 6.28   HEMOGLOBIN g/dL 12.7 12.2 12.7 13.0 12.4   PLATELETS 10*3/mm3 164 161 187 262 270         Results from last 7 days   Lab Units 10/06/21  0705 10/05/21  0511 10/04/21  0527 10/03/21  0955 10/02/21  0351   ALT (SGPT) U/L 79* 55* 41* 36* 33   AST (SGOT) U/L 39* 27 29 26 21         Results from last 7 days   Lab Units 10/06/21  0705 10/04/21  0527 10/02/21  0351 09/30/21  0423   PROCALCITONIN ng/mL 0.05 0.05 0.05 0.05      COVID LABS:  Results From Last 14 Days   Lab Units 10/06/21  0705 10/05/21  0511 10/04/21  0527 10/03/21  0955 10/03/21  0955 10/02/21  1342 10/02/21  0351 10/01/21  0513 09/30/21  0423 09/28/21  0433 09/27/21  0401 09/26/21  0417 09/26/21  0417 09/25/21  0608 09/25/21  0346 09/24/21  0537 09/23/21  0442 09/22/21  1538   PROBNP pg/mL   --   --   --   --   --   --   --   --   --   --   --   --   --   --   --   --   --  103.7   CK TOTAL U/L  --   --   --   --   --   --   --   --   --   --  34  --  48  --  51   < > 153  --    CRP mg/dL <0.30 <0.30 <0.30   < > <0.30   < >  --   --   --   --  3.12*   < > 4.89*   < > 9.80*   < > 24.67*  --    D DIMER QUANT MCGFEU/mL  --  0.55*  --   --  1.06*  --   --  0.60*  --    < > 0.61*  --   --    < >  --   --  1.66*  --    FERRITIN ng/mL 712.00* 646.00* 728.00*   < > 725.00*   < >  --   --   --   --  1,056.00*   < > 1,212.00*   < > 1,557.00*   < > 1,121.00*  --    LACTATE mmol/L  --   --   --   --   --   --   --   --   --   --   --   --   --   --   --   --   --  1.4   LDH U/L  --   --   --   --   --   --   --   --   --   --   --   --   --   --   --   --  517*  --    PROCALCITONIN ng/mL 0.05  --  0.05  --   --   --  0.05  --    < >   < >  --   --  0.09  --   --    < >  --  0.16   PROTIME Seconds  --   --   --   --   --   --   --   --   --   --   --   --   --   --   --   --   --  14.9*   INR   --   --   --   --   --   --   --   --   --   --   --   --   --   --   --   --   --  1.19*   TROPONIN T ng/mL  --   --   --   --   --   --   --   --   --   --   --   --   --   --   --   --   --  <0.010    < > = values in this interval not displayed.     \     Glucose   Date/Time Value Ref Range Status   10/06/2021 0015 107 70 - 130 mg/dL Final     Comment:     Meter: [id number] : [id number] Marie Kothari RN   10/05/2021 2040 251 (H) 70 - 130 mg/dL Final     Comment:     Meter: [id number] : [id number] Raymundo Mosley NA   10/05/2021 1617 285 (H) 70 - 130 mg/dL Final     Comment:     Meter: [id number] : [id number] Conradheath Jacobs NA   10/05/2021 1109 249 (H) 70 - 130 mg/dL Final     Comment:     Meter: [id number] : [id number] Conrad Jacobs NA   10/05/2021 0638 121 70 - 130 mg/dL Final     Comment:     Meter: [id number] : [id number] Raymundo Mosley NA   10/05/2021 0046 149 (H) 70 - 130 mg/dL Final     Comment:      Meter: [id number] : [id number] Marie Kothari RN   10/04/2021 2051 175 (H) 70 - 130 mg/dL Final     Comment:     Meter: [id number] : [id number] Raymundo Mosley MATTHEW     Most recent chest x-ray 10/2 reviewed: Scattered infiltrates consistent with Covid pneumonia.  Mildly improved compared to previous imaging.      Microbiology reviewed                Active Hospital Problems    Diagnosis  POA   • Oropharyngeal dysphagia [R13.12]  Yes   • Hypothyroidism (acquired) [E03.9]  Yes   • Acute hypoxemic respiratory failure due to COVID-19 (HCC) [U07.1, J96.01]  Yes   • Obesity (BMI 30-39.9) [E66.9]  Yes   • Type 2 diabetes mellitus with diabetic polyneuropathy, without long-term current use of insulin (HCC) [E11.42]  Yes   • Pneumonia due to COVID-19 virus [U07.1, J12.82]  Yes   • Hypertension [I10]  Yes   • JENAE (obstructive sleep apnea) [G47.33]  Yes   • Gastroesophageal reflux disease [K21.9]  Yes      Resolved Hospital Problems   No resolved problems to display.         ASSESSMENT:    Acute hypoxic respiratory failure  Noninvasive ventilator dependent -9/24  Acute Covid pneumonia  Elevated inflammatory markers s/p Actemra-9/24  Hypernatremia  JENAE on CPAP  Morbid obesity        PLAN:    Encourage pulmonary toilet.  Weaning steroids.  Weaning oxygen as able.  NIPPV for work of breathing and as needed for sleep apnea.  Nebulized bronchodilators to help with clearance.  Control glucose.  Control blood pressure.  Speech therapy following for dysphagia.  Hopefully feeding tube can come out today.  Likely discharge to rehabilitation for the next day or so.      Pierre Carr MD  Pulmonary and Critical Care Medicine  [facility], [facility]  10/6/2021    09:04 EDT

## 2021-10-06 NOTE — PROGRESS NOTES
Name: Brodie Bueno ADMIT: 2021   : 1960  PCP: Angie Olivarez MD    MRN: [id number] LOS: 14 days   AGE/SEX: 60 y.o. female  ROOM: [facility]     Subjective   Subjective   CC: dyspnea  No acute events. Dyspnea continues to improve. Taking more PO.  . No CP/f/c/n/v/d.    Objective   Objective   Vital Signs  Temp:  [95.7 °F (35.4 °C)-97.5 °F (36.4 °C)] 95.7 °F (35.4 °C)  Heart Rate:  [] 91  Resp:  [16-18] 18  BP: ()/(58-78) 120/78  SpO2:  [90 %-96 %] 90 %  on  Flow (L/min):  [2-4] 3;   Device (Oxygen Therapy): nasal cannula  Body mass index is 34.35 kg/m².  Physical Exam  Vitals and nursing note reviewed.   Constitutional:       General: She is not in acute distress.     Appearance: She is not toxic-appearing or diaphoretic.   HENT:      Head: Normocephalic and atraumatic.      Nose: Nose normal.      Mouth/Throat:      Mouth: Mucous membranes are moist.      Pharynx: Oropharynx is clear.   Eyes:      Extraocular Movements: Extraocular movements intact.      Conjunctiva/sclera: Conjunctivae normal.      Pupils: Pupils are equal, round, and reactive to light.   Cardiovascular:      Rate and Rhythm: Normal rate and regular rhythm.      Pulses: Normal pulses.   Pulmonary:      Effort: Pulmonary effort is normal.      Breath sounds: Examination of the right-lower field reveals decreased breath sounds. Examination of the left-lower field reveals decreased breath sounds. Decreased breath sounds present.   Abdominal:      General: Bowel sounds are normal.      Tenderness: There is no abdominal tenderness.   Musculoskeletal:         General: Swelling (trace BLE) present. No tenderness.      Cervical back: Normal range of motion and neck supple.   Skin:     General: Skin is warm and dry.      Capillary Refill: Capillary refill takes less than 2 seconds.   Neurological:      General: No focal deficit present.      Mental Status: She is alert and oriented to person, place, and time.    Psychiatric:         Mood and Affect: Mood normal.         Behavior: Behavior normal.     Results Review     I reviewed the patient's new clinical results.  I reviewed the patient's telemetry.  Results from last 7 days   Lab Units 10/06/21  0705 10/05/21  0512 10/04/21  0527 10/03/21  0955   WBC 10*3/mm3 6.21 6.34 6.39 7.36   HEMOGLOBIN g/dL 12.7 12.2 12.7 13.0   PLATELETS 10*3/mm3 164 161 187 262     Results from last 7 days   Lab Units 10/06/21  0705 10/05/21  0511 10/04/21  0527 10/03/21  0955   SODIUM mmol/L 141 140 141 138   POTASSIUM mmol/L 3.7 3.9 4.0 4.2   CHLORIDE mmol/L 102 101 101 99   CO2 mmol/L 29.9* 30.2* 27.8 29.6*   BUN mg/dL 15 18 23 23   CREATININE mg/dL 0.64 0.51* 0.63 0.59   GLUCOSE mg/dL 86 133* 105* 236*   Estimated Creatinine Clearance: 103 mL/min (by C-G formula based on SCr of 0.64 mg/dL).  Results from last 7 days   Lab Units 10/06/21  0705 10/05/21  0511 10/04/21  0527 10/03/21  0955   ALBUMIN g/dL 3.40* 3.10* 2.90* 3.20*   BILIRUBIN mg/dL 0.6 0.4 0.4 0.5   ALK PHOS U/L 52 57 55 59   AST (SGOT) U/L 39* 27 29 26   ALT (SGPT) U/L 79* 55* 41* 36*     Results from last 7 days   Lab Units 10/06/21  0705 10/05/21  0511 10/04/21  0527 10/03/21  0955   CALCIUM mg/dL 8.9 8.7 8.5* 8.8   ALBUMIN g/dL 3.40* 3.10* 2.90* 3.20*     Results from last 7 days   Lab Units 10/06/21  0705 10/04/21  0527 10/02/21  0351 09/30/21  0423   PROCALCITONIN ng/mL 0.05 0.05 0.05 0.05     COVID19   Date Value Ref Range Status   09/22/2021 Detected (C) Not Detected - Ref. Range Final     Glucose   Date/Time Value Ref Range Status   10/06/2021 1121 111 70 - 130 mg/dL Final     Comment:     Meter: [id number] : [id number] Randy Edmonds NA   10/06/2021 0015 107 70 - 130 mg/dL Final     Comment:     Meter: [id number] : [id number] Marie Kothari RN   10/05/2021 2040 251 (H) 70 - 130 mg/dL Final     Comment:     Meter: [id number] : [id number] Raymundo Mosley NA   10/05/2021 1617 285 (H) 70 - 130 mg/dL Final      Comment:     Meter: [id number] : [id number] Conrad CASTRO   10/05/2021 1109 249 (H) 70 - 130 mg/dL Final     Comment:     Meter: [id number] : [id number] Conrad Edgar NA   10/05/2021 0638 121 70 - 130 mg/dL Final     Comment:     Meter: [id number] : [id number] Raymundo Mosley NA   10/05/2021 0046 149 (H) 70 - 130 mg/dL Final     Comment:     Meter: [id number] : [id number] Marie Kothari RN     COVID LABS:  Results From Last 14 Days   Lab Units 10/06/21  0705 10/05/21  0511 10/04/21  0527 10/03/21  0955 10/03/21  0955 10/02/21  1342 10/02/21  0351 10/01/21  0513 09/30/21  0423 09/28/21  0433 09/27/21  0401 09/26/21  0417 09/26/21  0417 09/25/21  0608 09/25/21  0346 09/24/21  0537 09/23/21  0442 09/22/21  1538   PROBNP pg/mL  --   --   --   --   --   --   --   --   --   --   --   --   --   --   --   --   --  103.7   CK TOTAL U/L  --   --   --   --   --   --   --   --   --   --  34  --  48  --  51   < > 153  --    CRP mg/dL <0.30 <0.30 <0.30   < > <0.30   < >  --   --   --   --  3.12*   < > 4.89*   < > 9.80*   < > 24.67*  --    D DIMER QUANT MCGFEU/mL  --  0.55*  --   --  1.06*  --   --  0.60*  --    < > 0.61*  --   --    < >  --   --  1.66*  --    FERRITIN ng/mL 712.00* 646.00* 728.00*   < > 725.00*   < >  --   --   --   --  1,056.00*   < > 1,212.00*   < > 1,557.00*   < > 1,121.00*  --    LACTATE mmol/L  --   --   --   --   --   --   --   --   --   --   --   --   --   --   --   --   --  1.4   LDH U/L  --   --   --   --   --   --   --   --   --   --   --   --   --   --   --   --  517*  --    PROCALCITONIN ng/mL 0.05  --  0.05  --   --   --  0.05  --    < >   < >  --   --  0.09  --   --    < >  --  0.16   PROTIME Seconds  --   --   --   --   --   --   --   --   --   --   --   --   --   --   --   --   --  14.9*   INR   --   --   --   --   --   --   --   --   --   --   --   --   --   --   --   --   --  1.19*   TROPONIN T ng/mL  --   --   --   --   --   --   --   --   --   --   --   --   --   --   --   --   --   <0.010    < > = values in this interval not displayed.         XR Chest 1 View  ONE VIEW PORTABLE CHEST AT 2:13 PM     HISTORY: Respiratory failure. Covid 19 pneumonia.     FINDINGS: The lungs are moderately expanded with scattered areas of  pneumonia likely related to Covid 19 pneumonia and showing slight  improvement from 09/23/2021. The heart remains slightly enlarged. A  feeding tube ends below the diaphragm.     This report was finalized on 10/2/2021 2:32 PM by Dr. Laith Balderas M.D.       Scheduled Medications  cetirizine, 10 mg, Oral, Daily  dexamethasone, 6 mg, Intravenous, Daily  dextromethorphan polistirex ER, 10 mL, Nasogastric, BID  enoxaparin, 40 mg, Subcutaneous, Q12H  gabapentin, 100 mg, Nasogastric, Daily  gabapentin, 200 mg, Nasogastric, Q PM  insulin glargine, 15 Units, Subcutaneous, Q12H  insulin lispro, 0-9 Units, Subcutaneous, Q6H  ipratropium-albuterol, 3 mL, Nebulization, 4x Daily - RT  lansoprazole, 30 mg, Oral, QAM  polyethylene glycol, 17 g, Oral, Daily  senna, 1 tablet, Oral, BID  Thyroid, 30 mg, Nasogastric, QAM AC    Infusions   Diet  Diet Soft Texture; Whole Foods; Thin; Consistent Carbohydrate       Assessment/Plan     Active Hospital Problems    Diagnosis  POA   • Oropharyngeal dysphagia [R13.12]  Yes   • Hypothyroidism (acquired) [E03.9]  Yes   • Acute hypoxemic respiratory failure due to COVID-19 (HCC) [U07.1, J96.01]  Yes   • Obesity (BMI 30-39.9) [E66.9]  Yes   • Type 2 diabetes mellitus with diabetic polyneuropathy, without long-term current use of insulin (HCC) [E11.42]  Yes   • Pneumonia due to COVID-19 virus [U07.1, J12.82]  Yes   • Hypertension [I10]  Yes   • JENAE (obstructive sleep apnea) [G47.33]  Yes   • Gastroesophageal reflux disease [K21.9]  Yes      Resolved Hospital Problems   No resolved problems to display.   COVID-19 Pneumonia with Acute Hypoxic Respiratory Failure  - patient is unvaccinated  - s/p remdesivir and tocilizumab  - continue decadron  - follow  inflammatory markers  - encourage pulmonary toilet and wean oxygen as tolerated  - appreciate pulmonology recs and critical care management    JENAE  - on BiPAP at night    Type 2 DM  - hyperglycemia exacerbated by steroids-these are being weaned  - continue lantus, glucose better with  increase to 15 units Q12H  - cover with ssi/hypoglycemia protocol  - has peripheral neuropathy on gabapentin    Oropharyngeal Dysphagia  - SLP has upgraded diet and she is taking more PO  - DC coretrack    HTN  - BP is on the low end  - hold losartan    GERD  - on lansoprazole    Hypothyroidism  - continue [place] Thyroid    Morbid Obesity   - Complicating above    Lovenox 40mg SC Q12H for DVT prophylaxis.  Full code.  Discussed with patient and nursing staff.  Anticipate discharge to SNU facility in 1-2 days.      MADHURI Roberts  [facility]  10/06/21  11:48 EDT

## 2021-10-07 LAB
ALBUMIN SERPL-MCNC: 3.4 G/DL (ref 3.5–5.2)
ALBUMIN/GLOB SERPL: 1.7 G/DL
ALP SERPL-CCNC: 53 U/L (ref 39–117)
ALT SERPL W P-5'-P-CCNC: 110 U/L (ref 1–33)
ANION GAP SERPL CALCULATED.3IONS-SCNC: 9.5 MMOL/L (ref 5–15)
AST SERPL-CCNC: 47 U/L (ref 1–32)
BILIRUB SERPL-MCNC: 0.6 MG/DL (ref 0–1.2)
BUN SERPL-MCNC: 19 MG/DL (ref 8–23)
BUN/CREAT SERPL: 27.9 (ref 7–25)
CALCIUM SPEC-SCNC: 8.7 MG/DL (ref 8.6–10.5)
CHLORIDE SERPL-SCNC: 104 MMOL/L (ref 98–107)
CO2 SERPL-SCNC: 28.5 MMOL/L (ref 22–29)
CREAT SERPL-MCNC: 0.68 MG/DL (ref 0.57–1)
CRP SERPL-MCNC: <0.3 MG/DL (ref 0–0.5)
D DIMER PPP FEU-MCNC: 0.51 MCGFEU/ML (ref 0–0.49)
DEPRECATED RDW RBC AUTO: 46.7 FL (ref 37–54)
ERYTHROCYTE [DISTWIDTH] IN BLOOD BY AUTOMATED COUNT: 14.8 % (ref 12.3–15.4)
FERRITIN SERPL-MCNC: 598 NG/ML (ref 13–150)
GFR SERPL CREATININE-BSD FRML MDRD: 106 ML/MIN/1.73
GLOBULIN UR ELPH-MCNC: 2 GM/DL
GLUCOSE BLDC GLUCOMTR-MCNC: 108 MG/DL (ref 70–130)
GLUCOSE BLDC GLUCOMTR-MCNC: 111 MG/DL (ref 70–130)
GLUCOSE BLDC GLUCOMTR-MCNC: 142 MG/DL (ref 70–130)
GLUCOSE BLDC GLUCOMTR-MCNC: 153 MG/DL (ref 70–130)
GLUCOSE BLDC GLUCOMTR-MCNC: 72 MG/DL (ref 70–130)
GLUCOSE SERPL-MCNC: 72 MG/DL (ref 65–99)
HCT VFR BLD AUTO: 37.3 % (ref 34–46.6)
HGB BLD-MCNC: 12.6 G/DL (ref 12–15.9)
MCH RBC QN AUTO: 30.3 PG (ref 26.6–33)
MCHC RBC AUTO-ENTMCNC: 33.8 G/DL (ref 31.5–35.7)
MCV RBC AUTO: 89.7 FL (ref 79–97)
PLATELET # BLD AUTO: 130 10*3/MM3 (ref 140–450)
PMV BLD AUTO: 11.3 FL (ref 6–12)
POTASSIUM SERPL-SCNC: 3.6 MMOL/L (ref 3.5–5.2)
POTASSIUM SERPL-SCNC: 4.1 MMOL/L (ref 3.5–5.2)
PROT SERPL-MCNC: 5.4 G/DL (ref 6–8.5)
RBC # BLD AUTO: 4.16 10*6/MM3 (ref 3.77–5.28)
SODIUM SERPL-SCNC: 142 MMOL/L (ref 136–145)
WBC # BLD AUTO: 3.69 10*3/MM3 (ref 3.4–10.8)

## 2021-10-07 PROCEDURE — 85379 FIBRIN DEGRADATION QUANT: CPT | Performed by: INTERNAL MEDICINE

## 2021-10-07 PROCEDURE — 84132 ASSAY OF SERUM POTASSIUM: CPT | Performed by: HOSPITALIST

## 2021-10-07 PROCEDURE — 82962 GLUCOSE BLOOD TEST: CPT

## 2021-10-07 PROCEDURE — 94799 UNLISTED PULMONARY SVC/PX: CPT

## 2021-10-07 PROCEDURE — 94660 CPAP INITIATION&MGMT: CPT

## 2021-10-07 PROCEDURE — 25010000002 ENOXAPARIN PER 10 MG: Performed by: INTERNAL MEDICINE

## 2021-10-07 PROCEDURE — 80053 COMPREHEN METABOLIC PANEL: CPT | Performed by: INTERNAL MEDICINE

## 2021-10-07 PROCEDURE — 97161 PT EVAL LOW COMPLEX 20 MIN: CPT

## 2021-10-07 PROCEDURE — 86140 C-REACTIVE PROTEIN: CPT | Performed by: INTERNAL MEDICINE

## 2021-10-07 PROCEDURE — 25010000002 DEXAMETHASONE PER 1 MG: Performed by: INTERNAL MEDICINE

## 2021-10-07 PROCEDURE — 97110 THERAPEUTIC EXERCISES: CPT

## 2021-10-07 PROCEDURE — 36415 COLL VENOUS BLD VENIPUNCTURE: CPT | Performed by: INTERNAL MEDICINE

## 2021-10-07 PROCEDURE — 94760 N-INVAS EAR/PLS OXIMETRY 1: CPT

## 2021-10-07 PROCEDURE — 63710000001 INSULIN GLARGINE PER 5 UNITS: Performed by: INTERNAL MEDICINE

## 2021-10-07 PROCEDURE — 82728 ASSAY OF FERRITIN: CPT | Performed by: INTERNAL MEDICINE

## 2021-10-07 PROCEDURE — 85027 COMPLETE CBC AUTOMATED: CPT | Performed by: INTERNAL MEDICINE

## 2021-10-07 RX ORDER — GABAPENTIN 100 MG/1
200 CAPSULE ORAL EVERY EVENING
Status: DISCONTINUED | OUTPATIENT
Start: 2021-10-08 | End: 2021-10-09 | Stop reason: HOSPADM

## 2021-10-07 RX ORDER — GABAPENTIN 100 MG/1
100 CAPSULE ORAL DAILY
Status: DISCONTINUED | OUTPATIENT
Start: 2021-10-08 | End: 2021-10-09 | Stop reason: HOSPADM

## 2021-10-07 RX ADMIN — IPRATROPIUM BROMIDE AND ALBUTEROL SULFATE 3 ML: 2.5; .5 SOLUTION RESPIRATORY (INHALATION) at 16:22

## 2021-10-07 RX ADMIN — ENOXAPARIN SODIUM 40 MG: 40 INJECTION SUBCUTANEOUS at 17:30

## 2021-10-07 RX ADMIN — POTASSIUM CHLORIDE 40 MEQ: 1.5 POWDER, FOR SOLUTION ORAL at 09:11

## 2021-10-07 RX ADMIN — DEXAMETHASONE SODIUM PHOSPHATE 6 MG: 10 INJECTION INTRAMUSCULAR; INTRAVENOUS at 08:33

## 2021-10-07 RX ADMIN — CETIRIZINE HYDROCHLORIDE 10 MG: 10 TABLET ORAL at 08:33

## 2021-10-07 RX ADMIN — DEXTROMETHORPHAN POLISTIREX 60 MG: 30 SUSPENSION, EXTENDED RELEASE ORAL at 21:06

## 2021-10-07 RX ADMIN — INSULIN GLARGINE 15 UNITS: 100 INJECTION, SOLUTION SUBCUTANEOUS at 22:00

## 2021-10-07 RX ADMIN — POTASSIUM CHLORIDE 40 MEQ: 750 TABLET, EXTENDED RELEASE ORAL at 13:41

## 2021-10-07 RX ADMIN — LEVOTHYROXINE, LIOTHYRONINE 30 MG: 19; 4.5 TABLET ORAL at 06:46

## 2021-10-07 RX ADMIN — SENNOSIDES 1 TABLET: 8.6 TABLET, FILM COATED ORAL at 08:33

## 2021-10-07 RX ADMIN — GABAPENTIN 100 MG: 100 CAPSULE ORAL at 08:36

## 2021-10-07 RX ADMIN — ENOXAPARIN SODIUM 40 MG: 40 INJECTION SUBCUTANEOUS at 06:46

## 2021-10-07 RX ADMIN — IPRATROPIUM BROMIDE AND ALBUTEROL SULFATE 3 ML: 2.5; .5 SOLUTION RESPIRATORY (INHALATION) at 08:38

## 2021-10-07 RX ADMIN — DEXTROMETHORPHAN POLISTIREX 60 MG: 30 SUSPENSION, EXTENDED RELEASE ORAL at 08:33

## 2021-10-07 RX ADMIN — GABAPENTIN 200 MG: 100 CAPSULE ORAL at 17:31

## 2021-10-07 RX ADMIN — IPRATROPIUM BROMIDE AND ALBUTEROL SULFATE 3 ML: 2.5; .5 SOLUTION RESPIRATORY (INHALATION) at 20:35

## 2021-10-07 RX ADMIN — INSULIN GLARGINE 15 UNITS: 100 INJECTION, SOLUTION SUBCUTANEOUS at 08:36

## 2021-10-07 RX ADMIN — LANSOPRAZOLE 30 MG: KIT at 06:46

## 2021-10-07 RX ADMIN — IPRATROPIUM BROMIDE AND ALBUTEROL SULFATE 3 ML: 2.5; .5 SOLUTION RESPIRATORY (INHALATION) at 10:56

## 2021-10-07 RX ADMIN — SENNOSIDES 1 TABLET: 8.6 TABLET, FILM COATED ORAL at 21:06

## 2021-10-07 NOTE — PROGRESS NOTES
[facility] INPATIENT PROGRESS NOTE         [address]    10/7/2021      PATIENT IDENTIFICATION:  Name: Brodie Bueno ADMIT: 2021   : 1960  PCP: Angie Olivarez MD    MRN: [id number] LOS: 15 days   AGE/SEX: 60 y.o. female  ROOM: [facility]                     LOS 15    Reason for visit: Hypoxemic respiratory failure with Covid pneumonia      SUBJECTIVE:      Resting comfortably.  No new complaints.      Objective   OBJECTIVE:    Vital Sign Min/Max for last 24 hours  Temp  Min: 96.7 °F (35.9 °C)  Max: 98.1 °F (36.7 °C)   BP  Min: 99/66  Max: 106/69   Pulse  Min: 86  Max: 98   Resp  Min: 18  Max: 20   SpO2  Min: 90 %  Max: 96 %   No data recorded   Weight  Min: 93.6 kg (206 lb 6.4 oz)  Max: 93.6 kg (206 lb 6.4 oz)                         Body mass index is 34.35 kg/m².    Intake/Output Summary (Last 24 hours) at 10/7/2021 1043  Last data filed at 10/7/2021 0646  Gross per 24 hour   Intake 922 ml   Output --   Net 922 ml         Exam:  GEN:  No distress, appears stated age  LUNGS: Normal chest on inspection, palpation and auscultation  CV:  Normal S1S2, without murmur      Assessment     Scheduled meds:  cetirizine, 10 mg, Oral, Daily  dexamethasone, 6 mg, Intravenous, Daily  dextromethorphan polistirex ER, 10 mL, Nasogastric, BID  enoxaparin, 40 mg, Subcutaneous, Q12H  gabapentin, 100 mg, Nasogastric, Daily  gabapentin, 200 mg, Nasogastric, Q PM  insulin glargine, 15 Units, Subcutaneous, Q12H  insulin lispro, 0-9 Units, Subcutaneous, 4x Daily With Meals & Nightly  ipratropium-albuterol, 3 mL, Nebulization, 4x Daily - RT  lansoprazole, 30 mg, Oral, QAM  polyethylene glycol, 17 g, Oral, Daily  senna, 1 tablet, Oral, BID  Thyroid, 30 mg, Nasogastric, QAM AC      IV meds:                         Data Review:  Results from last 7 days   Lab Units 10/07/21  0631 10/06/21  0705 10/06/21  0705 10/05/21  0511 10/05/21  0511 10/04/21  0527 10/04/21  0527 10/03/21  0955 10/03/21  0955    SODIUM mmol/L 142  --  141  --  140  --  141  --  138   POTASSIUM mmol/L 3.6  --  3.7  --  3.9  --  4.0  --  4.2   CHLORIDE mmol/L 104  --  102  --  101  --  101  --  99   CO2 mmol/L 28.5  --  29.9*  --  30.2*  --  27.8  --  29.6*   BUN mg/dL 19  --  15  --  18  --  23  --  23   CREATININE mg/dL 0.68  --  0.64  --  0.51*  --  0.63  --  0.59   GLUCOSE mg/dL 72   < > 86   < > 133*   < > 105*   < > 236*   CALCIUM mg/dL 8.7  --  8.9  --  8.7  --  8.5*  --  8.8    < > = values in this interval not displayed.         Estimated Creatinine Clearance: 97 mL/min (by C-G formula based on SCr of 0.68 mg/dL).  Results from last 7 days   Lab Units 10/07/21  0631 10/06/21  0705 10/05/21  0512 10/04/21  0527 10/03/21  0955   WBC 10*3/mm3 3.69 6.21 6.34 6.39 7.36   HEMOGLOBIN g/dL 12.6 12.7 12.2 12.7 13.0   PLATELETS 10*3/mm3 130* 164 161 187 262         Results from last 7 days   Lab Units 10/07/21  0631 10/06/21  0705 10/05/21  0511 10/04/21  0527 10/03/21  0955   ALT (SGPT) U/L 110* 79* 55* 41* 36*   AST (SGOT) U/L 47* 39* 27 29 26         Results from last 7 days   Lab Units 10/06/21  0705 10/04/21  0527 10/02/21  0351   PROCALCITONIN ng/mL 0.05 0.05 0.05      COVID LABS:  Results From Last 14 Days   Lab Units 10/07/21  0631 10/06/21  0705 10/05/21  0511 10/04/21  0527 10/04/21  0527 10/03/21  0955 10/03/21  0955 10/02/21  1342 10/02/21  0351 09/28/21  0433 09/27/21  0401 09/26/21  0417 09/26/21  0417 09/25/21  0608 09/25/21  0346   CK TOTAL U/L  --   --   --   --   --   --   --   --   --   --  34  --  48  --  51   CRP mg/dL <0.30 <0.30 <0.30   < > <0.30   < > <0.30   < >  --   --  3.12*   < > 4.89*   < > 9.80*   D DIMER QUANT MCGFEU/mL 0.51*  --  0.55*  --   --   --  1.06*  --   --    < > 0.61*  --   --    < >  --    FERRITIN ng/mL 598.00* 712.00* 646.00*   < > 728.00*   < > 725.00*   < >  --   --  1,056.00*   < > 1,212.00*   < > 1,557.00*   PROCALCITONIN ng/mL  --  0.05  --   --  0.05  --   --   --  0.05   < >  --   --  0.09   --   --     < > = values in this interval not displayed.     \     Glucose   Date/Time Value Ref Range Status   10/07/2021 0645 72 70 - 130 mg/dL Final     Comment:     Meter: [id number] : [id number] Raymundo Mosley NA   10/07/2021 0020 142 (H) 70 - 130 mg/dL Final     Comment:     Meter: [id number] : [id number] Raymundo Melany NA   10/06/2021 2115 152 (H) 70 - 130 mg/dL Final     Comment:     Meter: [id number] : [id number] Marie Kothari RN   10/06/2021 1639 129 70 - 130 mg/dL Final     Comment:     Meter: [id number] : [id number] Randy Edmonds NA   10/06/2021 1121 111 70 - 130 mg/dL Final     Comment:     Meter: [id number] : [id number] Randy Edmonds NA   10/06/2021 0015 107 70 - 130 mg/dL Final     Comment:     Meter: [id number] : [id number] Marie Kothari RN   10/05/2021 2040 251 (H) 70 - 130 mg/dL Final     Comment:     Meter: [id number] : [id number] Raymundo Melany MATTHEW     Most recent chest x-ray 10/2 reviewed: Scattered infiltrates consistent with Covid pneumonia.  Mildly improved compared to previous imaging.      Microbiology reviewed                Active Hospital Problems    Diagnosis  POA   • Oropharyngeal dysphagia [R13.12]  Yes   • Hypothyroidism (acquired) [E03.9]  Yes   • Acute hypoxemic respiratory failure due to COVID-19 (HCC) [U07.1, J96.01]  Yes   • Obesity (BMI 30-39.9) [E66.9]  Yes   • Type 2 diabetes mellitus with diabetic polyneuropathy, without long-term current use of insulin (HCC) [E11.42]  Yes   • Pneumonia due to COVID-19 virus [U07.1, J12.82]  Yes   • Hypertension [I10]  Yes   • JENAE (obstructive sleep apnea) [G47.33]  Yes   • Gastroesophageal reflux disease [K21.9]  Yes      Resolved Hospital Problems   No resolved problems to display.         ASSESSMENT:    Acute hypoxic respiratory failure  Noninvasive ventilator dependent -9/24  Acute Covid pneumonia  Elevated inflammatory markers s/p Actemra-9/24  Hypernatremia  JENAE on CPAP  Morbid obesity        PLAN:    Encourage  pulmonary toilet.  Weaning steroids.  Weaning oxygen as able.  NIPPV for work of breathing and as needed for sleep apnea.  Nebulized bronchodilators to help with clearance.  Control glucose.  Control blood pressure.  Noted plan for discharge to [facility] on Saturday.  No objections from pulmonary standpoint.  Following peripherally for respiratory issues.        Pierre Carr MD  Pulmonary and Critical Care Medicine  [facility], [facility]  10/7/2021    10:43 EDT

## 2021-10-07 NOTE — PLAN OF CARE
Goal Outcome Evaluation:  Plan of Care Reviewed With: patient           Outcome Summary: Pt. is a 60 year old Female admitted to the hospital with COVID infection. Pt. reports that prior to admission she was independent with functional mobility and she did not use any type of A.D. during ambulation.  Pt. currently presents with decreased strength, decreased balance, and decreased tolerance to functional activity. This AM, pt. able to ambulate 10 feet, CGA x 1, with no use of A.D. Pt. requires CGA x 1 for bed mobility and CGA x 1 for sit <-> stand transfers. BLE ther. ex. program x 10 reps complete for general strengthening. Pt. will benefit from continued skilled inpt. P.T. to address her functional deficits and to assist pt. in regaining her maximum level of independence with functional mobility.    Patient was intermittently wearing a face mask during this therapy encounter. Therapist used appropriate personal protective equipment including eye protection, mask, and gloves.  Mask used was standard procedure mask. Appropriate PPE was worn during the entire therapy session. Hand hygiene was completed before and after therapy session. Patient is in enhanced droplet precautions.

## 2021-10-07 NOTE — THERAPY EVALUATION
Patient Name: Brodie Bueno  : 1960    MRN: [id number]                              Today's Date: 10/7/2021       Admit Date: 2021    Visit Dx:     ICD-10-CM ICD-9-CM   1. Pneumonia due to COVID-19 virus  U07.1 480.8    J12.82 079.89   2. Hypoxia  R09.02 799.02   3. Hypokalemia  E87.6 276.8     Patient Active Problem List   Diagnosis   • Gastroesophageal reflux disease   • Hypertension   • JENAE (obstructive sleep apnea)   • BESS (dyspnea on exertion)   • Fatigue   • Clinical diagnosis of COVID-19   • Pneumonia due to COVID-19 virus   • Acute hypoxemic respiratory failure due to COVID-19 (HCC)   • Obesity (BMI 30-39.9)   • Type 2 diabetes mellitus with diabetic polyneuropathy, without long-term current use of insulin (HCC)   • Hypothyroidism (acquired)   • Oropharyngeal dysphagia     Past Medical History:   Diagnosis Date   • Cough    • Fibromyalgia    • Frequent urination    • Gallbladder disease    • GERD (gastroesophageal reflux disease)    • Hemorrhoids    • Hyperlipidemia    • Hypersomnolence    • Hypertension    • Nasal congestion    • JENAE (obstructive sleep apnea)     mild-severe   • Postnasal drip    • Sleep apnea    • Snoring    • Subcutaneous abscess    • Weight gain      Past Surgical History:   Procedure Laterality Date   • CHOLECYSTECTOMY     • COLONOSCOPY  approx     normal per patient   • LYMPH NODE BIOPSY     • UPPER GASTROINTESTINAL ENDOSCOPY  approx     gerd per patient     General Information     Row Name 10/07/21 1107          Physical Therapy Time and Intention    Document Type  evaluation Pt. admitted with COVID  -MS     Mode of Treatment  physical therapy;individual therapy  -MS     Row Name 10/07/21 1107          General Information    Patient Profile Reviewed  yes  -MS     Prior Level of Function  independent:  -MS     Existing Precautions/Restrictions  (S) fall;oxygen therapy device and L/min COVID Isolation; Exit alarm  -MS     Barriers to Rehab  none identified   -MS     Row Name 10/07/21 1107          Cognition    Orientation Status (Cognition)  oriented x 3  -MS     Row Name 10/07/21 1107          Safety Issues, Functional Mobility    Comment, Safety Issues/Impairments (Mobility)  Gait belt used for safety.  -MS       User Key  (r) = Recorded By, (t) = Taken By, (c) = Cosigned By    Initials Name Provider Type    Nii Medel, PT Physical Therapist        Mobility     Row Name 10/07/21 1108          Bed Mobility    Bed Mobility  supine-sit;sit-supine  -MS     Supine-Sit [place] (Bed Mobility)  contact guard  -MS     Sit-Supine [place] (Bed Mobility)  contact guard  -MS     Row Name 10/07/21 1108          Sit-Stand Transfer    Sit-Stand [place] (Transfers)  contact guard  -MS     Row Name 10/07/21 1108          Gait/Stairs (Locomotion)    [place] Level (Gait)  contact guard  -MS     Distance in Feet (Gait)  10 feet  -MS     Deviations/Abnormal Patterns (Gait)  arti decreased  -MS     Bilateral Gait Deviations  forward flexed posture  -MS     Comment (Gait/Stairs)  Verbal/tactile cues for posture correction.  Limited in gait distance due to fatigue and increased SOA.  -MS       User Key  (r) = Recorded By, (t) = Taken By, (c) = Cosigned By    Initials Name Provider Type    Nii Medel PT Physical Therapist        Obj/Interventions     Row Name 10/07/21 1108          Range of Motion Comprehensive    Comment, General Range of Motion  BUE/LE (WFL's)  -MS     Row Name 10/07/21 1108          Strength Comprehensive (MMT)    Comment, General Manual Muscle Testing (MMT) Assessment  BUE/LE (3+/5)  -MS     Row Name 10/07/21 1108          Motor Skills    Therapeutic Exercise  -- BLE ther. ex. program x 10 reps completed (Ankle pumps, Hip Flexion, LAQ's)  -MS       User Key  (r) = Recorded By, (t) = Taken By, (c) = Cosigned By    Initials Name Provider Type    Nii Medel PT Physical Therapist        Goals/Plan     Row Name 10/07/21  1110          Bed Mobility Goal 1 (PT)    Activity/Assistive Device (Bed Mobility Goal 1, PT)  bed mobility activities, all  -MS     [place] Level/Cues Needed (Bed Mobility Goal 1, PT)  independent  -MS     Time Frame (Bed Mobility Goal 1, PT)  1 week;long term goal (LTG)  -MS     Row Name 10/07/21 1110          Transfer Goal 1 (PT)    Activity/Assistive Device (Transfer Goal 1, PT)  transfers, all  -MS     [place] Level/Cues Needed (Transfer Goal 1, PT)  independent  -MS     Time Frame (Transfer Goal 1, PT)  long term goal (LTG);1 week  -MS     Row Name 10/07/21 1110          Gait Training Goal 1 (PT)    Activity/Assistive Device (Gait Training Goal 1, PT)  gait (walking locomotion)  -MS     [place] Level (Gait Training Goal 1, PT)  independent  -MS     Distance (Gait Training Goal 1, PT)  100 feet  -MS     Time Frame (Gait Training Goal 1, PT)  long term goal (LTG);1 week  -MS       User Key  (r) = Recorded By, (t) = Taken By, (c) = Cosigned By    Initials Name Provider Type    Nii Medel, PT Physical Therapist        Clinical Impression     Row Name 10/07/21 1109          Pain Scale: Numbers Pre/Post-Treatment    Pretreatment Pain Rating  0/10 - no pain  -MS     Posttreatment Pain Rating  0/10 - no pain  -MS     Row Name 10/07/21 1109          Plan of Care Review    Plan of Care Reviewed With  patient  -MS     Row Name 10/07/21 1109          Therapy Assessment/Plan (PT)    Rehab Potential (PT)  good, to achieve stated therapy goals  -MS     Criteria for Skilled Interventions Met (PT)  skilled treatment is necessary  -MS     Row Name 10/07/21 1109          Vital Signs    Pre SpO2 (%)  91  -MS     O2 Delivery Pre Treatment  supplemental O2  -MS     Intra SpO2 (%)  83  -MS     O2 Delivery Intra Treatment  supplemental O2  -MS     Post SpO2 (%)  90  -MS     O2 Delivery Post Treatment  supplemental O2  -MS     Recovery Time  ~ 2 minutes post ambulation.  -MS     Row Name 10/07/21 110           Positioning and Restraints    Pre-Treatment Position  in bed  -MS     Post Treatment Position  bed  -MS     In Bed  notified nsg;supine;call light within reach;encouraged to call for assist;exit alarm on All lines intact.  -MS       User Key  (r) = Recorded By, (t) = Taken By, (c) = Cosigned By    Initials Name Provider Type    Nii Medel, PT Physical Therapist        Outcome Measures     Row Name 10/07/21 1110          How much help from another person do you currently need...    Turning from your back to your side while in flat bed without using bedrails?  3  -MS     Moving from lying on back to sitting on the side of a flat bed without bedrails?  3  -MS     Moving to and from a bed to a chair (including a wheelchair)?  3  -MS     Standing up from a chair using your arms (e.g., wheelchair, bedside chair)?  3  -MS     Climbing 3-5 steps with a railing?  3  -MS     To walk in hospital room?  3  -MS     AM-PAC 6 Clicks Score (PT)  18  -MS     Row Name 10/07/21 1110          Functional Assessment    Outcome Measure Options  AM-PAC 6 Clicks Basic Mobility (PT)  -MS       User Key  (r) = Recorded By, (t) = Taken By, (c) = Cosigned By    Initials Name Provider Type    Nii Medel, PT Physical Therapist                       Physical Therapy Education                 Title: PT OT SLP Therapies (Done)     Topic: Physical Therapy (Done)     Point: Mobility training (Done)     Learning Progress Summary           Patient Acceptance, E,D, VU,NR by MS at 10/7/2021 1111                   Point: Home exercise program (Done)     Learning Progress Summary           Patient Acceptance, E,D, VU,NR by MS at 10/7/2021 1111                   Point: Body mechanics (Done)     Learning Progress Summary           Patient Acceptance, E,D, VU,NR by MS at 10/7/2021 1111                   Point: Precautions (Done)     Learning Progress Summary           Patient Acceptance, E,D, VU,NR by MS at 10/7/2021 1111                                User Key     Initials Effective Dates Name Provider Type Discipline    MS 06/16/21 -  Nii Fernández PT Physical Therapist PT              PT Recommendation and Plan  Planned Therapy Interventions (PT): balance training, bed mobility training, gait training, home exercise program, patient/family education, postural re-education, transfer training, strengthening  Plan of Care Reviewed With: patient  Outcome Summary: Pt. is a 60 year old Female admitted to the hospital with COVID infection. Pt. reports that prior to admission she was independent with functional mobility and she did not use any type of A.D. during ambulation.  Pt. currently presents with decreased strength, decreased balance, and decreased tolerance to functional activity. This AM, pt. able to ambulate 10 feet, CGA x 1, with no use of A.D. Pt. requires CGA x 1 for bed mobility and CGA x 1 for sit <-> stand transfers. BLE ther. ex. program x 10 reps complete for general strengthening. Pt. will benefit from continued skilled inpt. P.T. to address her functional deficits and to assist pt. in regaining her maximum level of independence with functional mobility.     Time Calculation:   PT Charges     Row Name 10/07/21 1117             Time Calculation    Start Time  0840  -MS      Stop Time  0857  -MS      Time Calculation (min)  17 min  -MS      PT Received On  10/07/21  -MS      PT - Next Appointment  10/09/21  -MS      PT Goal Re-Cert Due Date  10/14/21  -MS         Time Calculation- PT    Total Timed Code Minutes- PT  16 minute(s)  -MS        User Key  (r) = Recorded By, (t) = Taken By, (c) = Cosigned By    Initials Name Provider Type    MS Nii Fernández PT Physical Therapist        Therapy Charges for Today     Code Description Service Date Service Provider Modifiers Qty    02708020158 HC PT EVAL LOW COMPLEXITY 2 10/7/2021 Nii Fernández, PT GP 1    42295402023 HC PT THER PROC EA 15 MIN 10/7/2021 Nii Fernández, PT GP 1           PT G-Codes  Outcome Measure Options: AM-PAC 6 Clicks Basic Mobility (PT)  AM-PAC 6 Clicks Score (PT): 18    Nii Fernández, PT  10/7/2021

## 2021-10-07 NOTE — CASE MANAGEMENT/SOCIAL WORK
Continued Stay Note  [facility]     Patient Name: Brodie Bueno  MRN: [id number]  Today's Date: 10/7/2021    Admit Date: 9/22/2021    Discharge Plan     Row Name 10/07/21 1454       Plan    Plan  [facility]- bed available Saturday    Patient/Family in Agreement with Plan  yes    Plan Comments  Spoke with patient by telephone and she is agreeable to transfer to [facility] [place].  Spoke with Pedro/[facility] and she confirms that they can accept patient on Saturday.  Patient will not need insurance pre-cert because [facility] is [facility] offering a waiver.  Transfer packet in [facility].  Patient will need ambulance at [place]. Yoselin Callahan RN        Discharge Codes    No documentation.       Expected Discharge Date and Time     Expected Discharge Date Expected Discharge Time    Oct 9, 2021             Yoselin Callahan RN

## 2021-10-07 NOTE — PROGRESS NOTES
"    Name: Brodie Bueno ADMIT: 2021   : 1960  PCP: Angie Olivarez MD    MRN: [id number] LOS: 15 days   AGE/SEX: 60 y.o. female  ROOM: [facility]     Subjective   Subjective   Fatigued. Appetite fair. Denies pain. States her breathing is \"low\"; verified as mild shortness of breath. Denies fever, chest pain    Review of Systems   Constitutional: Positive for fatigue.   HENT: Negative for congestion.    Respiratory: Positive for shortness of breath.    Cardiovascular: Negative for chest pain.   Gastrointestinal: Negative for nausea.   Genitourinary: Negative for difficulty urinating.   Musculoskeletal: Negative for arthralgias and myalgias.   Skin: Negative for rash.   Neurological: Negative for headaches.   Psychiatric/Behavioral: Negative for sleep disturbance.        Objective   Objective   Vital Signs  Temp:  [96.7 °F (35.9 °C)-98.1 °F (36.7 °C)] 96.7 °F (35.9 °C)  Heart Rate:  [] 87  Resp:  [18-20] 18  BP: ()/(64-69) 106/69  SpO2:  [91 %-98 %] 98 %  on  Flow (L/min):  [1-2] 1;   Device (Oxygen Therapy): nasal cannula  Body mass index is 34.35 kg/m².  Physical Exam  Vitals and nursing note reviewed.   Constitutional:       General: She is sleeping. She is not in acute distress.  HENT:      Head: Normocephalic.      Mouth/Throat:      Mouth: Mucous membranes are moist.   Eyes:      Conjunctiva/sclera: Conjunctivae normal.   Cardiovascular:      Rate and Rhythm: Normal rate and regular rhythm.   Pulmonary:      Effort: Pulmonary effort is normal. No respiratory distress.      Breath sounds: No wheezing or rales.   Abdominal:      General: Bowel sounds are normal.      Palpations: Abdomen is soft.   Musculoskeletal:      Cervical back: Neck supple.      Right lower leg: No edema.      Left lower leg: No edema.   Skin:     General: Skin is warm and dry.   Neurological:      Mental Status: She is oriented to person, place, and time and easily aroused.   Psychiatric:         Mood and " Affect: Affect is flat.         Results Review     I reviewed the patient's new clinical results.  Results from last 7 days   Lab Units 10/07/21  0631 10/06/21  0705 10/05/21  0512 10/04/21  0527   WBC 10*3/mm3 3.69 6.21 6.34 6.39   HEMOGLOBIN g/dL 12.6 12.7 12.2 12.7   PLATELETS 10*3/mm3 130* 164 161 187     Results from last 7 days   Lab Units 10/07/21  0631 10/06/21  0705 10/05/21  0511 10/04/21  0527   SODIUM mmol/L 142 141 140 141   POTASSIUM mmol/L 3.6 3.7 3.9 4.0   CHLORIDE mmol/L 104 102 101 101   CO2 mmol/L 28.5 29.9* 30.2* 27.8   BUN mg/dL 19 15 18 23   CREATININE mg/dL 0.68 0.64 0.51* 0.63   GLUCOSE mg/dL 72 86 133* 105*   Estimated Creatinine Clearance: 97 mL/min (by C-G formula based on SCr of 0.68 mg/dL).  Results from last 7 days   Lab Units 10/07/21  0631 10/06/21  0705 10/05/21  0511 10/04/21  0527   ALBUMIN g/dL 3.40* 3.40* 3.10* 2.90*   BILIRUBIN mg/dL 0.6 0.6 0.4 0.4   ALK PHOS U/L 53 52 57 55   AST (SGOT) U/L 47* 39* 27 29   ALT (SGPT) U/L 110* 79* 55* 41*     Results from last 7 days   Lab Units 10/07/21  0631 10/06/21  0705 10/05/21  0511 10/04/21  0527   CALCIUM mg/dL 8.7 8.9 8.7 8.5*   ALBUMIN g/dL 3.40* 3.40* 3.10* 2.90*     Results from last 7 days   Lab Units 10/06/21  0705 10/04/21  0527 10/02/21  0351   PROCALCITONIN ng/mL 0.05 0.05 0.05     COVID19   Date Value Ref Range Status   09/22/2021 Detected (C) Not Detected - Ref. Range Final     Glucose   Date/Time Value Ref Range Status   10/07/2021 1056 153 (H) 70 - 130 mg/dL Final     Comment:     Meter: [id number] : [id number] Jesus Adams RN   10/07/2021 0645 72 70 - 130 mg/dL Final     Comment:     Meter: [id number] : [id number] Raymundo Mosley NA   10/07/2021 0020 142 (H) 70 - 130 mg/dL Final     Comment:     Meter: [id number] : [id number] Raymundo Mosley NA   10/06/2021 2115 152 (H) 70 - 130 mg/dL Final     Comment:     Meter: [id number] : [id number] Marie Kothari RN   10/06/2021 1639 129 70 - 130 mg/dL Final      Comment:     Meter: [id number] : [id number] Randy CASTRO   10/06/2021 1121 111 70 - 130 mg/dL Final     Comment:     Meter: [id number] : [id number] Randy CASTRO   10/06/2021 0015 107 70 - 130 mg/dL Final     Comment:     Meter: [id number] : [id number] Marie Kothari RN       XR Chest 1 View  ONE VIEW PORTABLE CHEST AT 2:13 PM     HISTORY: Respiratory failure. Covid 19 pneumonia.     FINDINGS: The lungs are moderately expanded with scattered areas of  pneumonia likely related to Covid 19 pneumonia and showing slight  improvement from 09/23/2021. The heart remains slightly enlarged. A  feeding tube ends below the diaphragm.     This report was finalized on 10/2/2021 2:32 PM by Dr. Laith Balderas M.D.       Scheduled Medications  cetirizine, 10 mg, Oral, Daily  dexamethasone, 6 mg, Intravenous, Daily  dextromethorphan polistirex ER, 10 mL, Nasogastric, BID  enoxaparin, 40 mg, Subcutaneous, Q12H  gabapentin, 100 mg, Nasogastric, Daily  gabapentin, 200 mg, Nasogastric, Q PM  insulin glargine, 15 Units, Subcutaneous, Q12H  insulin lispro, 0-9 Units, Subcutaneous, 4x Daily With Meals & Nightly  ipratropium-albuterol, 3 mL, Nebulization, 4x Daily - RT  lansoprazole, 30 mg, Oral, QAM  polyethylene glycol, 17 g, Oral, Daily  senna, 1 tablet, Oral, BID  Thyroid, 30 mg, Nasogastric, QAM AC    Infusions   Diet  Diet Soft Texture; Whole Foods; Thin; Consistent Carbohydrate       Assessment/Plan     Active Hospital Problems    Diagnosis  POA   • **Acute hypoxemic respiratory failure due to COVID-19 (HCC) [U07.1, J96.01]  Yes   • Oropharyngeal dysphagia [R13.12]  Yes   • Hypothyroidism (acquired) [E03.9]  Yes   • Obesity (BMI 30-39.9) [E66.9]  Yes   • Type 2 diabetes mellitus with diabetic polyneuropathy, without long-term current use of insulin (HCC) [E11.42]  Yes   • Pneumonia due to COVID-19 virus [U07.1, J12.82]  Yes   • Hypertension [I10]  Yes   • JENAE (obstructive sleep apnea) [G47.33]  Yes   • Gastroesophageal  reflux disease [K21.9]  Yes      Resolved Hospital Problems   No resolved problems to display.       60 y.o. female admitted with Acute hypoxemic respiratory failure due to COVID-19 (HCC).    COVID-19 Pneumonia with Acute Hypoxic Respiratory Failure  - patient is unvaccinated  - s/p remdesivir and tocilizumab  - continue decadron  - follow inflammatory markers; trending down  - encourage pulmonary toilet and wean oxygen as tolerated; sats stable on 2L NC  - appreciate pulmonology recs and critical care management     JENAE  - on BiPAP at night     Type 2 DM  - hyperglycemia exacerbated by steroids-these are being weaned  - continue lantus, glucose better with  increase to 15 units Q12H  - cover with ssi/hypoglycemia protocol  - has peripheral neuropathy on gabapentin     Oropharyngeal Dysphagia  - SLP has upgraded diet and she is taking more PO     HTN  - BP is on the low end  - hold losartan     GERD  - on lansoprazole     Hypothyroidism  - continue [place] Thyroid     Morbid Obesity   - Complicating above     Lovenox 40 mg SC daily for DVT prophylaxis.  Full code.  Discussed with patient.  Anticipate discharge to SNU facility Saturday      MADHURI Bonilla  [facility]  10/07/21  13:24 EDT

## 2021-10-07 NOTE — PLAN OF CARE
Goal Outcome Evaluation:  Plan of Care Reviewed With: patient        Progress: no change  Outcome Summary: Room air sats 87-88%.  States she is unsteady when ambulating.  Denies shortness of air with oxygen on.  Swallowing well.  K+ replaced today.
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
## 2021-10-07 NOTE — PLAN OF CARE
Goal Outcome Evaluation:  Plan of Care Reviewed With: patient        Progress: no change  Outcome Summary: pt alert and oriented x 4. denies any pain or soa. up to bsc throughout shift.  pt remains on  2lpm via nc. pt refused lunchtime insulin d/t decreased appetite.  potassium protocol administered per MAR. will continue to monitor.

## 2021-10-07 NOTE — PLAN OF CARE
Goal Outcome Evaluation:  Plan of Care Reviewed With: patient  Progress: improving  Outcome Summary: All needs met. Up w/ assist x 1 to BSC several times this shift. Soft whole food diet w/ thin liquids, meds whole in pudding. VSS. No mental status changes. On 2L NC. Wore BiPAP for a little tonight, but did not like it.  NSR on the monitor. No complaints. Blood glucose monitoring. Plans to d/c on Saturday to [facility]. Will CTM.

## 2021-10-08 LAB
ALBUMIN SERPL-MCNC: 3.1 G/DL (ref 3.5–5.2)
ALBUMIN/GLOB SERPL: 1.4 G/DL
ALP SERPL-CCNC: 56 U/L (ref 39–117)
ALT SERPL W P-5'-P-CCNC: 115 U/L (ref 1–33)
ANION GAP SERPL CALCULATED.3IONS-SCNC: 8.7 MMOL/L (ref 5–15)
AST SERPL-CCNC: 44 U/L (ref 1–32)
BILIRUB SERPL-MCNC: 0.6 MG/DL (ref 0–1.2)
BUN SERPL-MCNC: 14 MG/DL (ref 8–23)
BUN/CREAT SERPL: 19.7 (ref 7–25)
CALCIUM SPEC-SCNC: 8.7 MG/DL (ref 8.6–10.5)
CHLORIDE SERPL-SCNC: 108 MMOL/L (ref 98–107)
CO2 SERPL-SCNC: 26.3 MMOL/L (ref 22–29)
CREAT SERPL-MCNC: 0.71 MG/DL (ref 0.57–1)
CRP SERPL-MCNC: <0.3 MG/DL (ref 0–0.5)
DEPRECATED RDW RBC AUTO: 45.8 FL (ref 37–54)
ERYTHROCYTE [DISTWIDTH] IN BLOOD BY AUTOMATED COUNT: 14.8 % (ref 12.3–15.4)
FERRITIN SERPL-MCNC: 601 NG/ML (ref 13–150)
GFR SERPL CREATININE-BSD FRML MDRD: 101 ML/MIN/1.73
GLOBULIN UR ELPH-MCNC: 2.2 GM/DL
GLUCOSE BLDC GLUCOMTR-MCNC: 161 MG/DL (ref 70–130)
GLUCOSE BLDC GLUCOMTR-MCNC: 205 MG/DL (ref 70–130)
GLUCOSE BLDC GLUCOMTR-MCNC: 259 MG/DL (ref 70–130)
GLUCOSE BLDC GLUCOMTR-MCNC: 85 MG/DL (ref 70–130)
GLUCOSE SERPL-MCNC: 71 MG/DL (ref 65–99)
HCT VFR BLD AUTO: 37.8 % (ref 34–46.6)
HGB BLD-MCNC: 12.6 G/DL (ref 12–15.9)
MCH RBC QN AUTO: 29.9 PG (ref 26.6–33)
MCHC RBC AUTO-ENTMCNC: 33.3 G/DL (ref 31.5–35.7)
MCV RBC AUTO: 89.6 FL (ref 79–97)
PLATELET # BLD AUTO: 137 10*3/MM3 (ref 140–450)
PMV BLD AUTO: 11.3 FL (ref 6–12)
POTASSIUM SERPL-SCNC: 4 MMOL/L (ref 3.5–5.2)
PROCALCITONIN SERPL-MCNC: 0.05 NG/ML (ref 0–0.25)
PROT SERPL-MCNC: 5.3 G/DL (ref 6–8.5)
RBC # BLD AUTO: 4.22 10*6/MM3 (ref 3.77–5.28)
SODIUM SERPL-SCNC: 143 MMOL/L (ref 136–145)
WBC # BLD AUTO: 3.42 10*3/MM3 (ref 3.4–10.8)

## 2021-10-08 PROCEDURE — 63710000001 INSULIN GLARGINE PER 5 UNITS: Performed by: NURSE PRACTITIONER

## 2021-10-08 PROCEDURE — 82962 GLUCOSE BLOOD TEST: CPT

## 2021-10-08 PROCEDURE — 86140 C-REACTIVE PROTEIN: CPT | Performed by: INTERNAL MEDICINE

## 2021-10-08 PROCEDURE — 25010000002 ENOXAPARIN PER 10 MG: Performed by: INTERNAL MEDICINE

## 2021-10-08 PROCEDURE — 84145 PROCALCITONIN (PCT): CPT | Performed by: INTERNAL MEDICINE

## 2021-10-08 PROCEDURE — 94799 UNLISTED PULMONARY SVC/PX: CPT

## 2021-10-08 PROCEDURE — 63710000001 INSULIN LISPRO (HUMAN) PER 5 UNITS: Performed by: NURSE PRACTITIONER

## 2021-10-08 PROCEDURE — 94760 N-INVAS EAR/PLS OXIMETRY 1: CPT

## 2021-10-08 PROCEDURE — 85027 COMPLETE CBC AUTOMATED: CPT | Performed by: INTERNAL MEDICINE

## 2021-10-08 PROCEDURE — 80053 COMPREHEN METABOLIC PANEL: CPT | Performed by: INTERNAL MEDICINE

## 2021-10-08 PROCEDURE — 82728 ASSAY OF FERRITIN: CPT | Performed by: INTERNAL MEDICINE

## 2021-10-08 PROCEDURE — 25010000002 DEXAMETHASONE PER 1 MG: Performed by: INTERNAL MEDICINE

## 2021-10-08 PROCEDURE — 63710000001 INSULIN GLARGINE PER 5 UNITS: Performed by: INTERNAL MEDICINE

## 2021-10-08 RX ORDER — INSULIN GLARGINE 100 [IU]/ML
10 INJECTION, SOLUTION SUBCUTANEOUS EVERY 12 HOURS SCHEDULED
Status: DISCONTINUED | OUTPATIENT
Start: 2021-10-08 | End: 2021-10-09 | Stop reason: HOSPADM

## 2021-10-08 RX ADMIN — DEXAMETHASONE SODIUM PHOSPHATE 6 MG: 10 INJECTION INTRAMUSCULAR; INTRAVENOUS at 08:49

## 2021-10-08 RX ADMIN — IPRATROPIUM BROMIDE AND ALBUTEROL SULFATE 3 ML: 2.5; .5 SOLUTION RESPIRATORY (INHALATION) at 21:15

## 2021-10-08 RX ADMIN — INSULIN LISPRO 6 UNITS: 100 INJECTION, SOLUTION INTRAVENOUS; SUBCUTANEOUS at 17:30

## 2021-10-08 RX ADMIN — SENNOSIDES 1 TABLET: 8.6 TABLET, FILM COATED ORAL at 08:49

## 2021-10-08 RX ADMIN — IPRATROPIUM BROMIDE AND ALBUTEROL SULFATE 3 ML: 2.5; .5 SOLUTION RESPIRATORY (INHALATION) at 10:49

## 2021-10-08 RX ADMIN — LANSOPRAZOLE 30 MG: KIT at 06:58

## 2021-10-08 RX ADMIN — POLYETHYLENE GLYCOL 3350 17 G: 17 POWDER, FOR SOLUTION ORAL at 08:49

## 2021-10-08 RX ADMIN — DEXTROMETHORPHAN POLISTIREX 60 MG: 30 SUSPENSION, EXTENDED RELEASE ORAL at 08:49

## 2021-10-08 RX ADMIN — INSULIN GLARGINE 10 UNITS: 100 INJECTION, SOLUTION SUBCUTANEOUS at 21:15

## 2021-10-08 RX ADMIN — GABAPENTIN 100 MG: 100 CAPSULE ORAL at 08:49

## 2021-10-08 RX ADMIN — ENOXAPARIN SODIUM 40 MG: 40 INJECTION SUBCUTANEOUS at 06:58

## 2021-10-08 RX ADMIN — DEXTROMETHORPHAN POLISTIREX 60 MG: 30 SUSPENSION, EXTENDED RELEASE ORAL at 20:47

## 2021-10-08 RX ADMIN — SENNOSIDES 1 TABLET: 8.6 TABLET, FILM COATED ORAL at 20:47

## 2021-10-08 RX ADMIN — ENOXAPARIN SODIUM 40 MG: 40 INJECTION SUBCUTANEOUS at 17:30

## 2021-10-08 RX ADMIN — IPRATROPIUM BROMIDE AND ALBUTEROL SULFATE 3 ML: 2.5; .5 SOLUTION RESPIRATORY (INHALATION) at 14:51

## 2021-10-08 RX ADMIN — GABAPENTIN 200 MG: 100 CAPSULE ORAL at 17:30

## 2021-10-08 RX ADMIN — INSULIN GLARGINE 15 UNITS: 100 INJECTION, SOLUTION SUBCUTANEOUS at 08:52

## 2021-10-08 RX ADMIN — LEVOTHYROXINE, LIOTHYRONINE 30 MG: 19; 4.5 TABLET ORAL at 06:58

## 2021-10-08 RX ADMIN — IPRATROPIUM BROMIDE AND ALBUTEROL SULFATE 3 ML: 2.5; .5 SOLUTION RESPIRATORY (INHALATION) at 07:24

## 2021-10-08 RX ADMIN — CETIRIZINE HYDROCHLORIDE 10 MG: 10 TABLET ORAL at 08:49

## 2021-10-08 RX ADMIN — INSULIN LISPRO 2 UNITS: 100 INJECTION, SOLUTION INTRAVENOUS; SUBCUTANEOUS at 12:48

## 2021-10-08 RX ADMIN — INSULIN LISPRO 4 UNITS: 100 INJECTION, SOLUTION INTRAVENOUS; SUBCUTANEOUS at 20:47

## 2021-10-08 NOTE — PLAN OF CARE
Goal Outcome Evaluation:              Outcome Summary: No complaints overnight. Pt refuses to wear bipap. Currently on 2L nasal cannula. Possible d/c to [facility] on saturday. VSS. Will continue to monitor closely.

## 2021-10-08 NOTE — PROGRESS NOTES
[facility] INPATIENT PROGRESS NOTE         [address]    10/8/2021      PATIENT IDENTIFICATION:  Name: Brodie Bueno ADMIT: 2021   : 1960  PCP: Angie Olivarez MD    MRN: [id number] LOS: 16 days   AGE/SEX: 60 y.o. female  ROOM: [facility]                     LOS 16    Reason for visit: Hypoxemic respiratory failure with Covid pneumonia      SUBJECTIVE:      Resting comfortably.  No new complaints.      Objective   OBJECTIVE:    Vital Sign Min/Max for last 24 hours  Temp  Min: 97.5 °F (36.4 °C)  Max: 98.3 °F (36.8 °C)   BP  Min: 92/69  Max: 109/75   Pulse  Min: 84  Max: 102   Resp  Min: 18  Max: 18   SpO2  Min: 88 %  Max: 99 %   No data recorded   Weight  Min: 92.8 kg (204 lb 9.6 oz)  Max: 92.8 kg (204 lb 9.6 oz)                         Body mass index is 34.05 kg/m².    Intake/Output Summary (Last 24 hours) at 10/8/2021 1048  Last data filed at 10/8/2021 0900  Gross per 24 hour   Intake 960 ml   Output --   Net 960 ml         Exam:  GEN:  No distress, appears stated age  LUNGS: Normal chest on inspection, palpation and auscultation  CV:  Normal S1S2, without murmur      Assessment     Scheduled meds:  cetirizine, 10 mg, Oral, Daily  dextromethorphan polistirex ER, 10 mL, Nasogastric, BID  enoxaparin, 40 mg, Subcutaneous, Q12H  gabapentin, 100 mg, Oral, Daily  gabapentin, 200 mg, Oral, Q PM  insulin glargine, 15 Units, Subcutaneous, Q12H  insulin lispro, 0-9 Units, Subcutaneous, 4x Daily With Meals & Nightly  ipratropium-albuterol, 3 mL, Nebulization, 4x Daily - RT  lansoprazole, 30 mg, Oral, QAM  polyethylene glycol, 17 g, Oral, Daily  senna, 1 tablet, Oral, BID  Thyroid, 30 mg, Nasogastric, QAM AC      IV meds:                         Data Review:  Results from last 7 days   Lab Units 10/08/21  0446 10/07/21  1559 10/07/21  0631 10/07/21  0631 10/06/21  0705 10/06/21  0705 10/05/21  0511 10/05/21  0511 10/04/21  0527 10/04/21  0527   SODIUM mmol/L 143  --   --  142  --  141  --   140  --  141   POTASSIUM mmol/L 4.0 4.1  --  3.6  --  3.7  --  3.9   < > 4.0   CHLORIDE mmol/L 108*  --   --  104  --  102  --  101  --  101   CO2 mmol/L 26.3  --   --  28.5  --  29.9*  --  30.2*  --  27.8   BUN mg/dL 14  --   --  19  --  15  --  18  --  23   CREATININE mg/dL 0.71  --   --  0.68  --  0.64  --  0.51*  --  0.63   GLUCOSE mg/dL 71  --    < > 72   < > 86   < > 133*   < > 105*   CALCIUM mg/dL 8.7  --   --  8.7  --  8.9  --  8.7  --  8.5*    < > = values in this interval not displayed.         Estimated Creatinine Clearance: 92.5 mL/min (by C-G formula based on SCr of 0.71 mg/dL).  Results from last 7 days   Lab Units 10/08/21  0446 10/07/21  0631 10/06/21  0705 10/05/21  0512 10/04/21  0527   WBC 10*3/mm3 3.42 3.69 6.21 6.34 6.39   HEMOGLOBIN g/dL 12.6 12.6 12.7 12.2 12.7   PLATELETS 10*3/mm3 137* 130* 164 161 187         Results from last 7 days   Lab Units 10/08/21  0446 10/07/21  0631 10/06/21  0705 10/05/21  0511 10/04/21  0527   ALT (SGPT) U/L 115* 110* 79* 55* 41*   AST (SGOT) U/L 44* 47* 39* 27 29         Results from last 7 days   Lab Units 10/08/21  0446 10/06/21  0705 10/04/21  0527 10/02/21  0351   PROCALCITONIN ng/mL 0.05 0.05 0.05 0.05      COVID LABS:  Results From Last 14 Days   Lab Units 10/08/21  0446 10/07/21  0631 10/06/21  0705 10/05/21  0511 10/05/21  0511 10/04/21  0527 10/04/21  0527 10/03/21  0955 10/03/21  0955 09/28/21  0433 09/27/21  0401 09/26/21  0417 09/26/21  0417 09/25/21  0608 09/25/21  0346   CK TOTAL U/L  --   --   --   --   --   --   --   --   --   --  34  --  48  --  51   CRP mg/dL <0.30 <0.30 <0.30   < > <0.30   < > <0.30   < > <0.30   < > 3.12*   < > 4.89*   < > 9.80*   D DIMER QUANT MCGFEU/mL  --  0.51*  --   --  0.55*  --   --   --  1.06*   < > 0.61*  --   --    < >  --    FERRITIN ng/mL 601.00* 598.00* 712.00*   < > 646.00*   < > 728.00*   < > 725.00*   < > 1,056.00*   < > 1,212.00*   < > 1,557.00*   PROCALCITONIN ng/mL 0.05  --  0.05  --   --   --  0.05  --    --    < >  --   --  0.09  --   --     < > = values in this interval not displayed.     \     Glucose   Date/Time Value Ref Range Status   10/08/2021 0609 85 70 - 130 mg/dL Final     Comment:     Meter: [id number] : [id number] Jewel Kim NA   10/07/2021 2008 111 70 - 130 mg/dL Final     Comment:     Meter: [id number] : [id number] Jewel Kim NA   10/07/2021 1612 108 70 - 130 mg/dL Final     Comment:     Meter: [id number] : [id number] Jesus Adams RN   10/07/2021 1056 153 (H) 70 - 130 mg/dL Final     Comment:     Meter: [id number] : [id number] Jesus Adams RN   10/07/2021 0645 72 70 - 130 mg/dL Final     Comment:     Meter: [id number] : [id number] Raymundo Mosley NA   10/07/2021 0020 142 (H) 70 - 130 mg/dL Final     Comment:     Meter: [id number] : [id number] Raymundo Mosley NA   10/06/2021 2115 152 (H) 70 - 130 mg/dL Final     Comment:     Meter: [id number] : [id number] Marie Kothari RN     Most recent chest x-ray 10/2 reviewed: Scattered infiltrates consistent with Covid pneumonia.  Mildly improved compared to previous imaging.      Microbiology reviewed                Active Hospital Problems    Diagnosis  POA   • **Acute hypoxemic respiratory failure due to COVID-19 (HCC) [U07.1, J96.01]  Yes   • Oropharyngeal dysphagia [R13.12]  Yes   • Hypothyroidism (acquired) [E03.9]  Yes   • Obesity (BMI 30-39.9) [E66.9]  Yes   • Type 2 diabetes mellitus with diabetic polyneuropathy, without long-term current use of insulin (HCC) [E11.42]  Yes   • Pneumonia due to COVID-19 virus [U07.1, J12.82]  Yes   • Hypertension [I10]  Yes   • JENAE (obstructive sleep apnea) [G47.33]  Yes   • Gastroesophageal reflux disease [K21.9]  Yes      Resolved Hospital Problems   No resolved problems to display.         ASSESSMENT:    Acute hypoxic respiratory failure  Noninvasive ventilator dependent -9/24  Acute Covid pneumonia  Elevated inflammatory markers s/p Actemra-9/24  Hypernatremia  JENAE on CPAP  Morbid  obesity        PLAN:    Encourage pulmonary toilet.  Weaning steroids.  Weaning oxygen as able.  Encourage NIPPV for work of breathing and as needed for sleep apnea.  Patient has been refusing.  Nebulized bronchodilators to help with clearance.  Control glucose.  Control blood pressure.  Noted plan for discharge to [facility] on Saturday.  No objections from pulmonary standpoint.  Following peripherally for respiratory issues.        Pierre Carr MD  Pulmonary and Critical Care Medicine  [facility]  10/8/2021    10:48 EDT

## 2021-10-08 NOTE — PLAN OF CARE
Patient remains on 2L NC. Still awaiting a bed at rehab. VSS. WCTM.       Problem: Adult Inpatient Plan of Care  Goal: Plan of Care Review  Outcome: Ongoing, Progressing  Flowsheets  Taken 10/8/2021 1814 by Carmelita Zuniga, RN  Progress: no change  Taken 10/7/2021 1801 by Spring Cordon, RN  Plan of Care Reviewed With: patient   Goal Outcome Evaluation:           Progress: no change

## 2021-10-08 NOTE — PROGRESS NOTES
Name: Brodie Bueno ADMIT: 2021   : 1960  PCP: Angie Olivarez MD    MRN: [id number] LOS: 16 days   AGE/SEX: 60 y.o. female  ROOM: [facility]     Subjective   Subjective   She is tired but has no new complaints. Excited to be discharged.  No SOB.     Review of Systems   Constitutional: Positive for fatigue.   HENT: Negative for congestion.    Respiratory: Positive for shortness of breath.    Cardiovascular: Negative for chest pain.   Gastrointestinal: Negative for nausea.   Genitourinary: Negative for difficulty urinating.   Musculoskeletal: Negative for arthralgias and myalgias.   Skin: Negative for rash.   Neurological: Negative for headaches.   Psychiatric/Behavioral: Negative for sleep disturbance.        Objective   Objective   Vital Signs  Temp:  [97.5 °F (36.4 °C)-98.3 °F (36.8 °C)] 97.7 °F (36.5 °C)  Heart Rate:  [] 105  Resp:  [18] 18  BP: ()/(56-75) 122/75  SpO2:  [88 %-99 %] 94 %  on  Flow (L/min):  [2] 2;   Device (Oxygen Therapy): nasal cannula  Body mass index is 34.05 kg/m².  Physical Exam  Vitals and nursing note reviewed.   Constitutional:       General: She is sleeping. She is not in acute distress.  HENT:      Head: Normocephalic.      Mouth/Throat:      Mouth: Mucous membranes are moist.   Eyes:      Conjunctiva/sclera: Conjunctivae normal.   Cardiovascular:      Rate and Rhythm: Normal rate and regular rhythm.   Pulmonary:      Effort: Pulmonary effort is normal. No respiratory distress.      Breath sounds: No wheezing or rales.   Abdominal:      General: Bowel sounds are normal.      Palpations: Abdomen is soft.   Musculoskeletal:      Cervical back: Neck supple.      Right lower leg: No edema.      Left lower leg: No edema.   Skin:     General: Skin is warm and dry.   Neurological:      Mental Status: She is oriented to person, place, and time and easily aroused.   Psychiatric:         Mood and Affect: Affect is flat.         Results Review     I reviewed the  patient's new clinical results.  Results from last 7 days   Lab Units 10/08/21  0446 10/07/21  0631 10/06/21  0705 10/05/21  0512   WBC 10*3/mm3 3.42 3.69 6.21 6.34   HEMOGLOBIN g/dL 12.6 12.6 12.7 12.2   PLATELETS 10*3/mm3 137* 130* 164 161     Results from last 7 days   Lab Units 10/08/21  0446 10/07/21  1559 10/07/21  0631 10/06/21  0705 10/05/21  0511 10/05/21  0511   SODIUM mmol/L 143  --  142 141  --  140   POTASSIUM mmol/L 4.0 4.1 3.6 3.7   < > 3.9   CHLORIDE mmol/L 108*  --  104 102  --  101   CO2 mmol/L 26.3  --  28.5 29.9*  --  30.2*   BUN mg/dL 14  --  19 15  --  18   CREATININE mg/dL 0.71  --  0.68 0.64  --  0.51*   GLUCOSE mg/dL 71  --  72 86  --  133*    < > = values in this interval not displayed.   Estimated Creatinine Clearance: 92.5 mL/min (by C-G formula based on SCr of 0.71 mg/dL).  Results from last 7 days   Lab Units 10/08/21  0446 10/07/21  0631 10/06/21  0705 10/05/21  0511   ALBUMIN g/dL 3.10* 3.40* 3.40* 3.10*   BILIRUBIN mg/dL 0.6 0.6 0.6 0.4   ALK PHOS U/L 56 53 52 57   AST (SGOT) U/L 44* 47* 39* 27   ALT (SGPT) U/L 115* 110* 79* 55*     Results from last 7 days   Lab Units 10/08/21  0446 10/07/21  0631 10/06/21  0705 10/05/21  0511   CALCIUM mg/dL 8.7 8.7 8.9 8.7   ALBUMIN g/dL 3.10* 3.40* 3.40* 3.10*     Results from last 7 days   Lab Units 10/08/21  0446 10/06/21  0705 10/04/21  0527 10/02/21  0351   PROCALCITONIN ng/mL 0.05 0.05 0.05 0.05         COVID19   Date Value Ref Range Status   09/22/2021 Detected (C) Not Detected - Ref. Range Final     Glucose   Date/Time Value Ref Range Status   10/08/2021 1137 161 (H) 70 - 130 mg/dL Final     Comment:     Meter: [id number] : [id number] Caleb Aneta GONZALEZ   10/08/2021 0609 85 70 - 130 mg/dL Final     Comment:     Meter: [id number] : [id number] Jewel Kim NA   10/07/2021 2008 111 70 - 130 mg/dL Final     Comment:     Meter: [id number] : [id number] Jewel Kim NA   10/07/2021 1612 108 70 - 130 mg/dL Final     Comment:     Meter:  [id number] : [id number] Jesus Tilleyelle RN   10/07/2021 1056 153 (H) 70 - 130 mg/dL Final     Comment:     Meter: [id number] : [id number] Jesus Adams RN   10/07/2021 0645 72 70 - 130 mg/dL Final     Comment:     Meter: [id number] : [id number] Raymundo Mosley NA   10/07/2021 0020 142 (H) 70 - 130 mg/dL Final     Comment:     Meter: [id number] : [id number] Raymundo Mosley NA       XR Chest 1 View  ONE VIEW PORTABLE CHEST AT 2:13 PM     HISTORY: Respiratory failure. Covid 19 pneumonia.     FINDINGS: The lungs are moderately expanded with scattered areas of  pneumonia likely related to Covid 19 pneumonia and showing slight  improvement from 09/23/2021. The heart remains slightly enlarged. A  feeding tube ends below the diaphragm.     This report was finalized on 10/2/2021 2:32 PM by Dr. Laith Balderas M.D.       Scheduled Medications  cetirizine, 10 mg, Oral, Daily  dextromethorphan polistirex ER, 10 mL, Nasogastric, BID  enoxaparin, 40 mg, Subcutaneous, Q12H  gabapentin, 100 mg, Oral, Daily  gabapentin, 200 mg, Oral, Q PM  insulin glargine, 15 Units, Subcutaneous, Q12H  insulin lispro, 0-9 Units, Subcutaneous, 4x Daily With Meals & Nightly  ipratropium-albuterol, 3 mL, Nebulization, 4x Daily - RT  lansoprazole, 30 mg, Oral, QAM  polyethylene glycol, 17 g, Oral, Daily  senna, 1 tablet, Oral, BID  Thyroid, 30 mg, Nasogastric, QAM AC    Infusions   Diet  Diet Soft Texture; Whole Foods; Thin; Consistent Carbohydrate       Assessment/Plan     Active Hospital Problems    Diagnosis  POA   • **Acute hypoxemic respiratory failure due to COVID-19 (HCC) [U07.1, J96.01]  Yes   • Oropharyngeal dysphagia [R13.12]  Yes   • Hypothyroidism (acquired) [E03.9]  Yes   • Obesity (BMI 30-39.9) [E66.9]  Yes   • Type 2 diabetes mellitus with diabetic polyneuropathy, without long-term current use of insulin (HCC) [E11.42]  Yes   • Pneumonia due to COVID-19 virus [U07.1, J12.82]  Yes   • Hypertension [I10]  Yes   • JENAE  (obstructive sleep apnea) [G47.33]  Yes   • Gastroesophageal reflux disease [K21.9]  Yes      Resolved Hospital Problems   No resolved problems to display.       60 y.o. female admitted with Acute hypoxemic respiratory failure due to COVID-19 (HCC).    COVID-19 Pneumonia with Acute Hypoxic Respiratory Failure  - patient is unvaccinated  - s/p remdesivir and tocilizumab and decadron  - stop inflammatory markers; trended down  - encourage pulmonary toilet and wean oxygen as tolerated; sats stable on 2L NC  - appreciate pulmonology recs and critical care management     JENAE  - on BiPAP at night     Type 2 DM  - hyperglycemia exacerbated by steroids-these are being weaned so better   - decrease lantus, glucose better    - cover with ssi/hypoglycemia protocol  - has peripheral neuropathy on gabapentin     Oropharyngeal Dysphagia  - SLP has upgraded diet and she is taking more PO     HTN  - BP is fine without losartan so will hold        GERD  - on lansoprazole     Hypothyroidism  - continue Pollok Thyroid     Morbid Obesity   - Complicating above     Lovenox 40 mg SC daily for DVT prophylaxis.  Full code.  Discussed with patient.  Anticipate discharge to SNU facility Saturday       MADHURI Roberts  [facility]  10/08/21  14:25 EDT

## 2021-10-09 VITALS
SYSTOLIC BLOOD PRESSURE: 112 MMHG | WEIGHT: 203.1 LBS | BODY MASS INDEX: 33.84 KG/M2 | RESPIRATION RATE: 18 BRPM | TEMPERATURE: 98.3 F | OXYGEN SATURATION: 95 % | HEIGHT: 65 IN | DIASTOLIC BLOOD PRESSURE: 76 MMHG | HEART RATE: 80 BPM

## 2021-10-09 PROBLEM — D89.833 CYTOKINE RELEASE SYNDROME, GRADE 3: Status: ACTIVE | Noted: 2021-10-09

## 2021-10-09 LAB
ALBUMIN SERPL-MCNC: 3.4 G/DL (ref 3.5–5.2)
ALBUMIN/GLOB SERPL: 1.8 G/DL
ALP SERPL-CCNC: 57 U/L (ref 39–117)
ALT SERPL W P-5'-P-CCNC: 127 U/L (ref 1–33)
ANION GAP SERPL CALCULATED.3IONS-SCNC: 8.4 MMOL/L (ref 5–15)
AST SERPL-CCNC: 44 U/L (ref 1–32)
BILIRUB SERPL-MCNC: 0.6 MG/DL (ref 0–1.2)
BUN SERPL-MCNC: 13 MG/DL (ref 8–23)
BUN/CREAT SERPL: 21.3 (ref 7–25)
CALCIUM SPEC-SCNC: 8.9 MG/DL (ref 8.6–10.5)
CHLORIDE SERPL-SCNC: 108 MMOL/L (ref 98–107)
CO2 SERPL-SCNC: 27.6 MMOL/L (ref 22–29)
CREAT SERPL-MCNC: 0.61 MG/DL (ref 0.57–1)
CRP SERPL-MCNC: <0.3 MG/DL (ref 0–0.5)
D DIMER PPP FEU-MCNC: 0.39 MCGFEU/ML (ref 0–0.49)
DEPRECATED RDW RBC AUTO: 46.1 FL (ref 37–54)
ERYTHROCYTE [DISTWIDTH] IN BLOOD BY AUTOMATED COUNT: 14.7 % (ref 12.3–15.4)
FERRITIN SERPL-MCNC: 551 NG/ML (ref 13–150)
GFR SERPL CREATININE-BSD FRML MDRD: 120 ML/MIN/1.73
GLOBULIN UR ELPH-MCNC: 1.9 GM/DL
GLUCOSE BLDC GLUCOMTR-MCNC: 117 MG/DL (ref 70–130)
GLUCOSE BLDC GLUCOMTR-MCNC: 67 MG/DL (ref 70–130)
GLUCOSE SERPL-MCNC: 57 MG/DL (ref 65–99)
HCT VFR BLD AUTO: 35.2 % (ref 34–46.6)
HGB BLD-MCNC: 11.8 G/DL (ref 12–15.9)
MCH RBC QN AUTO: 30 PG (ref 26.6–33)
MCHC RBC AUTO-ENTMCNC: 33.5 G/DL (ref 31.5–35.7)
MCV RBC AUTO: 89.6 FL (ref 79–97)
PLATELET # BLD AUTO: 115 10*3/MM3 (ref 140–450)
PMV BLD AUTO: 11.9 FL (ref 6–12)
POTASSIUM SERPL-SCNC: 3.8 MMOL/L (ref 3.5–5.2)
PROT SERPL-MCNC: 5.3 G/DL (ref 6–8.5)
RBC # BLD AUTO: 3.93 10*6/MM3 (ref 3.77–5.28)
SODIUM SERPL-SCNC: 144 MMOL/L (ref 136–145)
WBC # BLD AUTO: 4.43 10*3/MM3 (ref 3.4–10.8)

## 2021-10-09 PROCEDURE — 63710000001 INSULIN GLARGINE PER 5 UNITS: Performed by: NURSE PRACTITIONER

## 2021-10-09 PROCEDURE — 94799 UNLISTED PULMONARY SVC/PX: CPT

## 2021-10-09 PROCEDURE — 85379 FIBRIN DEGRADATION QUANT: CPT | Performed by: INTERNAL MEDICINE

## 2021-10-09 PROCEDURE — 25010000002 ENOXAPARIN PER 10 MG: Performed by: INTERNAL MEDICINE

## 2021-10-09 PROCEDURE — 85027 COMPLETE CBC AUTOMATED: CPT | Performed by: INTERNAL MEDICINE

## 2021-10-09 PROCEDURE — 36415 COLL VENOUS BLD VENIPUNCTURE: CPT | Performed by: INTERNAL MEDICINE

## 2021-10-09 PROCEDURE — 86140 C-REACTIVE PROTEIN: CPT | Performed by: INTERNAL MEDICINE

## 2021-10-09 PROCEDURE — 80053 COMPREHEN METABOLIC PANEL: CPT | Performed by: INTERNAL MEDICINE

## 2021-10-09 PROCEDURE — 82962 GLUCOSE BLOOD TEST: CPT

## 2021-10-09 PROCEDURE — 82728 ASSAY OF FERRITIN: CPT | Performed by: INTERNAL MEDICINE

## 2021-10-09 RX ORDER — GABAPENTIN 100 MG/1
CAPSULE ORAL
Qty: 9 CAPSULE | Refills: 0 | Status: SHIPPED | OUTPATIENT
Start: 2021-10-09

## 2021-10-09 RX ORDER — INSULIN GLARGINE 100 [IU]/ML
10 INJECTION, SOLUTION SUBCUTANEOUS EVERY 12 HOURS SCHEDULED
Refills: 12
Start: 2021-10-09

## 2021-10-09 RX ADMIN — SENNOSIDES 1 TABLET: 8.6 TABLET, FILM COATED ORAL at 09:16

## 2021-10-09 RX ADMIN — GABAPENTIN 100 MG: 100 CAPSULE ORAL at 09:16

## 2021-10-09 RX ADMIN — ACETAMINOPHEN 650 MG: 325 TABLET, FILM COATED ORAL at 00:57

## 2021-10-09 RX ADMIN — IPRATROPIUM BROMIDE AND ALBUTEROL SULFATE 3 ML: 2.5; .5 SOLUTION RESPIRATORY (INHALATION) at 08:43

## 2021-10-09 RX ADMIN — ENOXAPARIN SODIUM 40 MG: 40 INJECTION SUBCUTANEOUS at 06:11

## 2021-10-09 RX ADMIN — LEVOTHYROXINE, LIOTHYRONINE 30 MG: 19; 4.5 TABLET ORAL at 06:12

## 2021-10-09 RX ADMIN — CETIRIZINE HYDROCHLORIDE 10 MG: 10 TABLET ORAL at 09:16

## 2021-10-09 RX ADMIN — INSULIN GLARGINE 10 UNITS: 100 INJECTION, SOLUTION SUBCUTANEOUS at 09:16

## 2021-10-09 RX ADMIN — DEXTROMETHORPHAN POLISTIREX 60 MG: 30 SUSPENSION, EXTENDED RELEASE ORAL at 09:16

## 2021-10-09 NOTE — PLAN OF CARE
Goal Outcome Evaluation:           Progress: improving  Outcome Summary: Upon entering patient's room at beginning of shift she was found without her nasal cannula because she said she couldn't find it. It had been off for several minutes but her O2 was 92%. After she fell asleep she dropped into high 80s so bipap was applied. One dose of Tylenol given for stomach pain. Patient pleasant, no other complaints. She feels ready to discharge. VSS.

## 2021-10-09 NOTE — DISCHARGE SUMMARY
Date of Admission: 9/22/2021  Date of Discharge:  10/9/2021  Primary Care Physician: Angie Olivarez MD     Discharge Diagnosis:  Active Hospital Problems    Diagnosis  POA   • **Acute hypoxemic respiratory failure due to COVID-19 (HCC) [U07.1, J96.01]  Yes   • Cytokine release syndrome, grade 3 [D89.833]  Unknown   • Oropharyngeal dysphagia [R13.12]  Yes   • Hypothyroidism (acquired) [E03.9]  Yes   • Obesity (BMI 30-39.9) [E66.9]  Yes   • Type 2 diabetes mellitus with diabetic polyneuropathy, without long-term current use of insulin (HCC) [E11.42]  Yes   • Pneumonia due to COVID-19 virus [U07.1, J12.82]  Yes   • Hypertension [I10]  Yes   • JENAE (obstructive sleep apnea) [G47.33]  Yes   • Gastroesophageal reflux disease [K21.9]  Yes      Resolved Hospital Problems   No resolved problems to display.       Presenting Problem/History of Present Illness:  Hypokalemia [E87.6]  Hypoxia [R09.02]  Pneumonia due to COVID-19 virus [U07.1, J12.82]     60 year old female who presented to the emergency room with shortness of breath, cough,congestion for about a week; she is worse with exertion but has been short of breath at rest; she tested positive for covid several days ago and had low oxygen at the infusion center; her daughter has covid as well; she is not vaccinated; she had a temp of 103 at home    Hospital Course:  The patient is a 60 y.o. female who presented with acute hypoxic respiratory failure secondary to COVID-19 pneumonia.  She was admitted and pulmonology consulted.  She required transfer to the ICU for high oxygen requirements.  There she was stabilized.  She received Actemra remdesivir and dexamethasone.  She was transferred back to the floor and her dexamethasone has been weaned.  She has diabetes with an elevated A1c of 7.8.  She has been placed on insulin therapy and this dose has been decreased some since she has had decreased steroid requirements.  Continue to monitor this in the outpatient setting  and she may be able to come back off of insulin and start orals in the future.  She is still requiring 2 L of oxygen but has been stable on this for several days.  She been cleared for discharge by consulting service and can go to rehab.    Her blood pressure has been low and her home blood pressure medicines were stopped.  Since she has not developed any elevated blood pressure here we will continue off of these medications for now.  Resume as needed in the outpatient setting probably starting with ARB due to diabetes.    She did have oropharyngeal dysphagia requiring core track at 1 point.  Speech has reevaluated and she has advanced diet and is improving with oral intake.    Exam Today:  General AA NAD  HEENT NCAT EOMI  CV RRR  Lungs decreased breath sounds no wheezes  Abdomen ND NT  Extremity no cyanosis or edema  Neuro CN II through XII grossly intact  Psych normal mood and affect.    Results:  CXR  Patchy bilateral infiltrates, follow-up recommended.  Borderline heart size. Tortuous aorta.     CXR  Interval worsening of bilateral infiltrates.    CXR  The lungs are moderately expanded with scattered areas of  pneumonia likely related to Covid 19 pneumonia and showing slight  improvement from 09/23/2021. The heart remains slightly enlarged. A  feeding tube ends below the diaphragm.    Procedures Performed:         Consults:   Consults     Date and Time Order Name Status Description    9/23/2021  3:59 AM Inpatient Pulmonology Consult Completed     9/22/2021  5:00 PM [facility] (on-call MD unless specified) Details Completed            Discharge Disposition:  Skilled Nursing Facility (DC - External)    Discharge Medications:     Discharge Medications      New Medications      Instructions Start Date   insulin glargine 100 UNIT/ML injection  Commonly known as: LANTUS, SEMGLEE   10 Units, Subcutaneous, Every 12 Hours Scheduled         Changes to Medications      Instructions Start Date   gabapentin 100 MG capsule  Commonly  known as: NEURONTIN  What changed:   · how much to take  · when to take this  · additional instructions  · Another medication with the same name was removed. Continue taking this medication, and follow the directions you see here.   Take 1 tab QAM and 2 tabs QHS PO         Continue These Medications      Instructions Start Date   albuterol sulfate  (90 Base) MCG/ACT inhaler  Commonly known as: PROVENTIL HFA;VENTOLIN HFA;PROAIR HFA   2 puffs, Inhalation, Every 4 Hours PRN      celecoxib 200 MG capsule  Commonly known as: CeleBREX   200 mg, Oral, Daily      fluticasone 50 MCG/ACT nasal spray  Commonly known as: FLONASE   3 sprays, Nasal, Daily      lansoprazole 30 MG capsule  Commonly known as: PREVACID   TAKE ONE CAPSULE BY MOUTH EVERY DAY      levocetirizine 5 MG tablet  Commonly known as: XYZAL   5 mg, Oral, Every Evening      ondansetron ODT 4 MG disintegrating tablet  Commonly known as: Zofran ODT   4 mg, Oral, Every 8 Hours PRN      Thyroid 32.5 MG tablet  Commonly known as: ARMOUR   32.5 mg, Oral, Daily      vitamin B-12 1000 MCG tablet  Commonly known as: CYANOCOBALAMIN   5,000 mcg, Oral, Daily      Vitamin D3 50 MCG (2000 UT) capsule   1,000 Units, Oral         Stop These Medications    Benicar 20 MG tablet  Generic drug: olmesartan     chlorothiazide 250 MG tablet  Commonly known as: DIURIL     Dexilant 60 MG capsule  Generic drug: dexlansoprazole     indapamide 1.25 MG tablet  Commonly known as: LOZOL            Discharge Diet:   Diet Instructions     Diet: Consistent Carbohydrate, Soft Texture; Thin Liquids, No Restrictions; Whole      Discharge Diet:  Consistent Carbohydrate  Soft Texture       Fluid Consistency: Thin Liquids, No Restrictions    Soft Options: Whole          Activity at Discharge:   Activity Instructions     Activity as Tolerated      Other Activity Instructions      Activity Instructions: Follow CDC guidelines for isolation and vaccination recommendations.          Follow-up  Appointments:   Contact information for follow-up providers     Angie Olivarez MD Follow up.    Specialty: Internal Medicine  Contact information:  [address]  [address] [address]  [phone]                   Contact information for after-discharge care     Destination     [facility] .    Service: Skilled Nursing  Contact information:  [address]  [facility] [address] [phone]                             Test Results Pending at Discharge:       Brandon Mari MD  10/09/21  11:34 EDT    Time Spent on Discharge Activities: >30 minutes    Dictated portions using Dragon dictation software.  During the entire encounter, I was wearing recommended PPE including face mask and eye protection. Hand sanitization was performed prior to entering room and upon exit.

## 2021-10-09 NOTE — NURSING NOTE
Report called to receiving facility @ [facility]- spoke to Nurse Amado - all questions answered. Aware of pending transport time.

## 2021-10-09 NOTE — NURSING NOTE
Notified daughter, Jillian, of pt's pending discharge to [facility] - aware of ambulance  time for 3pm.  All questions answered.

## 2021-10-09 NOTE — CASE MANAGEMENT/SOCIAL WORK
Continued Stay Note  [facility]     Patient Name: Brodie Bueno  MRN: [id number]  Today's Date: 10/9/2021    Admit Date: 9/22/2021     Discharge Plan     Row Name 10/09/21 1233       Plan    Plan DC to [facility] via EMS    Plan Comments [facility] rec’d call from Nurse Ingris for pt- Brodie Bueno , asking for transportation be arrange for pt to go to [facility] today.  Pt will need EMS transfer as she is on O2 2L/NC.  CCP called [facility] [phone] and pt can return today.  CCP called EMS and [facility] scheduled  time for 3pm today. CCP called Nurse, Ingris gave her above information and to call report to [phone]. Nurse voiced understanding. Nurse calling patients family to inform of Discharge today. Thanks, ENRICO Bauer    Row Name 10/09/21 1225       Plan    Plan DC to [facility] via EMS               Discharge Codes    No documentation.               Expected Discharge Date and Time     Expected Discharge Date Expected Discharge Time    Oct 9, 2021             Yvrose Hardy

## 2021-10-09 NOTE — CASE MANAGEMENT/SOCIAL WORK
"Physicians Statement of Medical Necessity for  Ambulance Transportation    GENERAL INFORMATION     Name: Brodie Bueno  YOB: 1960  Medicare #:   Transport Date: 10/09/2021(Valid for round trips this date, or for scheduled repetitive trips for 60 days from the date signed below.)  Origin: [facility], [address] [place]  Destination: [address] [place] [address]  Is the Patient's stay covered under Medicare Part A (PPS/DRG?)No   Closest appropriate facility? No, reason transport to more distant facility is required: [facility]  If this a hosp-hosp transfer? No  Is this a hospice patient? No    MEDICAL NECESSITY QUESTIONAIRE    Ambulance Transportation is medically necessary only if other means of transportation are contraindicated or would be potentially harmful to the patient.  To meet this requirement, the patient must be either \"bed confined\" or suffer from a condition such that transport by means other than an ambulance is contraindicated by the patient's condition.  The following questions must be answered by the healthcare professional signing below for this form to be valid:     1) Describe the MEDICAL CONDITION (physical and/or mental) of this patient AT THE TIME OF AMBULANCE TRANSPORT that requires the patient to be transported in an ambulance, and why transport by other means is contraindicated by the patient's condition:     Requires O2, 2/L, continuously    Past Medical History:   Diagnosis Date   • Cough    • Fibromyalgia    • Frequent urination    • Gallbladder disease    • GERD (gastroesophageal reflux disease)    • Hemorrhoids    • Hyperlipidemia    • Hypersomnolence    • Hypertension    • Nasal congestion    • JENAE (obstructive sleep apnea)     mild-severe   • Postnasal drip    • Sleep apnea    • Snoring    • Subcutaneous abscess    • Weight gain       Past Surgical History:   Procedure Laterality Date   • CHOLECYSTECTOMY     • COLONOSCOPY  " "approx 2009    normal per patient   • LYMPH NODE BIOPSY     • UPPER GASTROINTESTINAL ENDOSCOPY  approx 2011    gerd per patient      2) Is this patient \"bed confined\" as defined below?No    To be \"bed confined\" the patient must satisfy all three of the following criteria:  (1) unable to get up from bed without assistance; AND (2) unable to ambulate;  AND (3) unable to sit in a chair or wheelchair.  3) Can this patient safely be transported by car or wheelchair van (I.e., may safely sit during transport, without an attendant or monitoring?)No   4. In addition to completing questions 1-3 above, please check any of the following conditions that apply          *Note: supporting documentation for any boxes checked must be maintained in the patient's medical records Requires oxygen - unable to self administer      SIGNATURE OF PHYSICIAN OR OTHER AUTHORIZED HEALTHCARE PROFESSIONAL    I certify that the above information is true and correct based on my evaluation of this patient, and represent that the patient requires transport by ambulance and that other forms of transport are contraindicated.  I understand that this information will be used by the Centers for Medicare and Medicaid Services (CMS) to support the determiniation of medical necessity for ambulance services, and I represent that I have personal knowledge of the patient's condition at the time of transport.       If this box is checked, I also certify that the patient is physically or mentally incapable of signing the ambulance service's claim form and that the institution with which I am affiliated has furnished care, services or assistance to the patient.  My signature below is made on behalf of the patient pursuant to 42 .36(b)(4). In accordance with 42 .37, the specific reason(s) that the patient is physically or mentally incapable of signing the claim for is as follows:       Signature of Physician or Healthcare Professional  Date/Time:    "     (For Scheduled repetitive transport, this form is not valid for transports performed more than 60 days after this date).                                                                                                                                            --------------------------------------------------------------------------------------------  Printed Name and Credentials of Physician or Authorized Healthcare Professional     *Form must be signed by patient's attending physician for scheduled, repetitive transports,.  For non-repetitive ambulance transports, if unable to obtain the signature of the attending physician, any of the following may sign (please select below):     Physician  Clinical Nurse Specialist  Registered Nurse     Physician Assistant  Discharge Planner  Licensed Practical Nurse     Nurse Practitioner

## 2021-10-11 NOTE — CASE MANAGEMENT/SOCIAL WORK
Case Management Discharge Note      Final Note: DC'd to skilled bed at [facility] via [facility] 10/9         Selected Continued Care - Discharged on 10/9/2021 Admission date: 9/22/2021 - Discharge disposition: Skilled Nursing Facility (DC - External)    Destination Coordination complete.    Service Provider Selected Services Address Phone Fax Patient Preferred    [facility]  Skilled Nursing [facility] [address] [phone] --                   Transportation Services  Ambulance: [facility]    Final Discharge Disposition Code: 03 - skilled nursing facility (SNF)

## 2021-11-17 ENCOUNTER — HOSPITAL ENCOUNTER (OUTPATIENT)
Dept: GENERAL RADIOLOGY | Facility: HOSPITAL | Age: 61
Discharge: HOME | End: 2021-11-17
Admitting: INTERNAL MEDICINE

## 2021-11-17 DIAGNOSIS — U07.1 COVID-19: ICD-10-CM

## 2021-11-17 PROCEDURE — 71046 X-RAY EXAM CHEST 2 VIEWS: CPT

## 2022-12-07 ENCOUNTER — HOSPITAL ENCOUNTER (OUTPATIENT)
Dept: GENERAL RADIOLOGY | Facility: HOSPITAL | Age: 62
Discharge: HOME | End: 2022-12-07
Admitting: INTERNAL MEDICINE

## 2022-12-07 DIAGNOSIS — R09.89 ABNORMAL LUNG SOUNDS: ICD-10-CM

## 2022-12-07 PROCEDURE — 71046 X-RAY EXAM CHEST 2 VIEWS: CPT

## 2023-01-09 ENCOUNTER — OFFICE VISIT (OUTPATIENT)
Dept: CARDIOLOGY | Facility: CLINIC | Age: 63
End: 2023-01-09
Payer: COMMERCIAL

## 2023-01-09 VITALS
SYSTOLIC BLOOD PRESSURE: 124 MMHG | WEIGHT: 207.4 LBS | HEART RATE: 85 BPM | HEIGHT: 66 IN | BODY MASS INDEX: 33.33 KG/M2 | DIASTOLIC BLOOD PRESSURE: 84 MMHG

## 2023-01-09 DIAGNOSIS — R53.83 OTHER FATIGUE: ICD-10-CM

## 2023-01-09 DIAGNOSIS — R07.2 PRECORDIAL PAIN: ICD-10-CM

## 2023-01-09 DIAGNOSIS — R06.09 EXERTIONAL DYSPNEA: Primary | ICD-10-CM

## 2023-01-09 PROCEDURE — 99204 OFFICE O/P NEW MOD 45 MIN: CPT | Performed by: INTERNAL MEDICINE

## 2023-01-09 RX ORDER — LISDEXAMFETAMINE DIMESYLATE 30 MG/1
30 CAPSULE ORAL DAILY
COMMUNITY
Start: 2022-12-29

## 2023-01-09 RX ORDER — INDAPAMIDE 1.25 MG/1
1.25 TABLET, FILM COATED ORAL EVERY MORNING
COMMUNITY
Start: 2022-11-08

## 2023-01-09 RX ORDER — ASPIRIN 81 MG/1
TABLET ORAL EVERY OTHER DAY
COMMUNITY
Start: 2020-10-21

## 2023-01-09 RX ORDER — FLASH GLUCOSE SENSOR
KIT MISCELLANEOUS
COMMUNITY
Start: 2022-11-10

## 2023-01-09 RX ORDER — GABAPENTIN 300 MG/1
CAPSULE ORAL
COMMUNITY
Start: 2021-11-16

## 2023-01-09 RX ORDER — POTASSIUM CHLORIDE 750 MG/1
TABLET, FILM COATED, EXTENDED RELEASE ORAL 2 TIMES DAILY
COMMUNITY
Start: 2022-12-15

## 2023-01-09 RX ORDER — DEXLANSOPRAZOLE 60 MG/1
CAPSULE, DELAYED RELEASE ORAL DAILY
COMMUNITY
Start: 2022-12-29

## 2023-01-09 RX ORDER — CANAGLIFLOZIN 100 MG/1
1 TABLET, FILM COATED ORAL DAILY
COMMUNITY
Start: 2022-11-10

## 2023-01-09 RX ORDER — ROSUVASTATIN CALCIUM 10 MG/1
5 TABLET, COATED ORAL NIGHTLY
COMMUNITY
Start: 2022-02-15

## 2023-01-09 RX ORDER — AMLODIPINE BESYLATE 2.5 MG/1
2.5 TABLET ORAL
COMMUNITY
Start: 2022-12-07

## 2023-01-09 RX ORDER — FLUTICASONE FUROATE, UMECLIDINIUM BROMIDE AND VILANTEROL TRIFENATATE 200; 62.5; 25 UG/1; UG/1; UG/1
1 POWDER RESPIRATORY (INHALATION) DAILY
COMMUNITY
Start: 2022-12-29

## 2023-01-09 NOTE — PROGRESS NOTES
[facility]      Patient Name: Brodie Bueno  :1960  Age: 62 y.o.  Encounter Provider:  Cr Marrero Jr, MD      Chief Complaint:   Chief Complaint   Patient presents with   • Fatigue         HPI  Brodie Bueno is a 62 y.o. female past medical history of diabetes, hypertension and dyslipidemia who presents for initial evaluation of severe fatigue.  Patient had COVID in 2021 but seems to have recovered well.  She has been noticing increased fatigue especially after activity for the last 4 to 5 months.  She did have RSV at around the time of the initiation of complaints.  She now notices more significant dyspnea on exertion and decreased exercise tolerance.  She has occasional atypical chest discomfort but no complaints that would be consistent with true angina.  She denies orthopnea, PND or edema.  No palpitations, dizziness or syncope.  Heart rate and blood pressure are well controlled today in clinic.  She has been doing a good job on glucose control and A1c has decreased significantly.  Her mother had a history of CABG in her 60s and her father suffered a fatal MI in his 80s.  She is a lifelong non-smoker drinks socially and denies illicit drug use.  No cardiac complaints at time of interview.      The following portions of the patient's history were reviewed and updated as appropriate: allergies, current medications, past family history, past medical history, past social history, past surgical history and problem list.      Review of Systems   Constitutional: Positive for malaise/fatigue. Negative for chills and fever.   HENT: Negative for hoarse voice and sore throat.    Eyes: Negative for double vision and photophobia.   Cardiovascular: Positive for dyspnea on exertion. Negative for chest pain, leg swelling, near-syncope, orthopnea, palpitations, paroxysmal nocturnal dyspnea and syncope.   Respiratory: Negative for cough and wheezing.    Skin: Negative for  poor wound healing and rash.   Musculoskeletal: Negative for arthritis and joint swelling.   Gastrointestinal: Negative for bloating, abdominal pain, hematemesis and hematochezia.   Neurological: Negative for dizziness and focal weakness.   Psychiatric/Behavioral: Negative for depression and suicidal ideas.       OBJECTIVE:   Vital Signs  Vitals:    01/09/23 1101   BP: 124/84   Pulse: 85     Estimated body mass index is 33.99 kg/m² as calculated from the following:    Height as of this encounter: 166.4 cm (65.5\").    Weight as of this encounter: 94.1 kg (207 lb 6.4 oz).    Vitals reviewed.   Constitutional:       Appearance: Healthy appearance. Not in distress.   Neck:      Vascular: No JVR. JVD normal.   Pulmonary:      Effort: Pulmonary effort is normal.      Breath sounds: Normal breath sounds. No wheezing. No rhonchi. No rales.   Chest:      Chest wall: Not tender to palpatation.   Cardiovascular:      PMI at left midclavicular line. Normal rate. Regular rhythm. Normal S1. Normal S2.      Murmurs: There is no murmur.      No gallop. No click. No rub.   Pulses:     Intact distal pulses.   Edema:     Peripheral edema absent.   Abdominal:      General: Bowel sounds are normal.      Palpations: Abdomen is soft.      Tenderness: There is no abdominal tenderness.   Musculoskeletal: Normal range of motion.         General: No tenderness. Skin:     General: Skin is warm and dry.   Neurological:      General: No focal deficit present.      Mental Status: Alert and oriented to person, place and time.           ECG 12 Lead    Date/Time: 1/10/2023 7:53 AM  Performed by: Cr Marrero Jr., MD  Authorized by: Cr Marrero Jr., MD   Comparison: compared with previous ECG from 9/22/2021  Comparison to previous ECG: Ventricular rate is significantly decreased  Rhythm: sinus rhythm    Clinical impression: normal ECG                  ASSESSMENT:     62-year-old female with diabetes, hypertension and dyslipidemia presents for  evaluation of exertional dyspnea    PLAN OF CARE:     1. Exertional dyspnea -significantly decreased exercise tolerance which started shortly after RSV infection.  Plan for treadmill stress study and echocardiogram given high risk clinical factors for ischemic heart disease.  Follow diagnostic testing for further treatment recommendations.  2. Essential hypertension -seemingly well controlled.  Continue sodium restricted diet and twice daily blood pressure log.  3. Diabetes  4. Dyslipidemia    Return to clinic 6 months             Discharge Medications          Accurate as of January 9, 2023 11:07 AM. If you have any questions, ask your nurse or doctor.            Continue These Medications      Instructions Start Date   albuterol sulfate  (90 Base) MCG/ACT inhaler  Commonly known as: PROVENTIL HFA;VENTOLIN HFA;PROAIR HFA   2 puffs, Inhalation, Every 4 Hours PRN      amLODIPine 2.5 MG tablet  Commonly known as: NORVASC   2.5 mg, Oral, Every Night at Bedtime      aspirin 81 MG EC tablet   Every Other Day      celecoxib 200 MG capsule  Commonly known as: CeleBREX   200 mg, Oral, Daily      dexlansoprazole 60 MG capsule  Commonly known as: DEXILANT   Daily      fluticasone 50 MCG/ACT nasal spray  Commonly known as: FLONASE   3 sprays, Nasal, Daily      FreeStyle Allison 14 Day Sensor misc   CHANGE EVERY 14 DAYS      gabapentin 100 MG capsule  Commonly known as: NEURONTIN   Take 1 tab QAM and 2 tabs QHS PO      gabapentin 300 MG capsule  Commonly known as: NEURONTIN   No dose, route, or frequency recorded.      indapamide 1.25 MG tablet  Commonly known as: LOZOL   1.25 mg, Oral, Every Morning      insulin glargine 100 UNIT/ML injection  Commonly known as: LANHANS SEMGLEE   10 Units, Subcutaneous, Every 12 Hours Scheduled      Invokana 100 MG tablet tablet  Generic drug: Canagliflozin   1 tablet, Oral, Daily      lansoprazole 30 MG capsule  Commonly known as: PREVACID   TAKE ONE CAPSULE BY MOUTH EVERY DAY       levocetirizine 5 MG tablet  Commonly known as: XYZAL   5 mg, Oral, Every Evening      ondansetron ODT 4 MG disintegrating tablet  Commonly known as: Zofran ODT   4 mg, Oral, Every 8 Hours PRN      potassium chloride 10 MEQ CR tablet   2 Times Daily      rosuvastatin 10 MG tablet  Commonly known as: CRESTOR   5 mg, Nightly      Thyroid 32.5 MG tablet  Commonly known as: ARMOUR   32.5 mg, Oral, Daily      Trelegy Ellipta 200-62.5-25 MCG/ACT aerosol powder   Generic drug: Fluticasone-Umeclidin-Vilant   1 puff, Daily      vitamin B-12 1000 MCG tablet  Commonly known as: CYANOCOBALAMIN   5,000 mcg, Oral, Daily      Vitamin D3 50 MCG (2000 UT) capsule   1,000 Units, Oral      Vyvanse 30 MG capsule  Generic drug: lisdexamfetamine   30 mg, Oral, Daily             Thank you for allowing me to participate in the care of your patient,      Sincerely,   Cr Marrero MD  [facility]  01/09/23  11:07 EST

## 2023-01-10 PROCEDURE — 93000 ELECTROCARDIOGRAM COMPLETE: CPT | Performed by: INTERNAL MEDICINE

## 2023-01-13 ENCOUNTER — CLINICAL SUPPORT (OUTPATIENT)
Dept: CARDIOLOGY | Facility: CLINIC | Age: 63
End: 2023-01-13
Payer: COMMERCIAL

## 2023-01-13 VITALS — HEART RATE: 92 BPM | DIASTOLIC BLOOD PRESSURE: 76 MMHG | SYSTOLIC BLOOD PRESSURE: 120 MMHG

## 2023-01-13 NOTE — PROGRESS NOTES
Pt came in today for a blood pressure only check    Pt checked her blood pressure with her machine and got 140/97 P: 92    Manual check 120/76 P: 92    I did advise pt that she may need to check with her pharmacy about getting a larger cuff. Her cuff barely wrapped all the way around her arm and I felt that this may be why the numbers are so different. I had to use a large cuff on her to get her blood pressure manually    Informed pt that I would share this information and that if there needed to be any other adjustments that we would give her a call

## 2023-02-03 ENCOUNTER — TELEPHONE (OUTPATIENT)
Dept: CARDIOLOGY | Facility: CLINIC | Age: 63
End: 2023-02-03
Payer: COMMERCIAL

## 2023-02-03 ENCOUNTER — HOSPITAL ENCOUNTER (OUTPATIENT)
Dept: CARDIOLOGY | Facility: HOSPITAL | Age: 63
Discharge: HOME | End: 2023-02-03
Payer: COMMERCIAL

## 2023-02-03 VITALS
HEART RATE: 104 BPM | OXYGEN SATURATION: 97 % | HEIGHT: 65 IN | SYSTOLIC BLOOD PRESSURE: 120 MMHG | BODY MASS INDEX: 34.49 KG/M2 | WEIGHT: 207 LBS | DIASTOLIC BLOOD PRESSURE: 80 MMHG

## 2023-02-03 DIAGNOSIS — R06.09 EXERTIONAL DYSPNEA: ICD-10-CM

## 2023-02-03 LAB
AORTIC ARCH: 2.8 CM
ASCENDING AORTA: 2.6 CM
BH CV ECHO MEAS - ACS: 1.71 CM
BH CV ECHO MEAS - AO MAX PG: 4.8 MMHG
BH CV ECHO MEAS - AO MEAN PG: 2.7 MMHG
BH CV ECHO MEAS - AO ROOT DIAM: 2.8 CM
BH CV ECHO MEAS - AO V2 MAX: 109.7 CM/SEC
BH CV ECHO MEAS - AO V2 VTI: 19 CM
BH CV ECHO MEAS - AVA(I,D): 2.34 CM2
BH CV ECHO MEAS - EDV(CUBED): 40.4 ML
BH CV ECHO MEAS - EDV(MOD-SP2): 36 ML
BH CV ECHO MEAS - EDV(MOD-SP4): 46 ML
BH CV ECHO MEAS - EF(MOD-BP): 68 %
BH CV ECHO MEAS - EF(MOD-SP2): 69.4 %
BH CV ECHO MEAS - EF(MOD-SP4): 67.4 %
BH CV ECHO MEAS - ESV(CUBED): 9.3 ML
BH CV ECHO MEAS - ESV(MOD-SP2): 11 ML
BH CV ECHO MEAS - ESV(MOD-SP4): 15 ML
BH CV ECHO MEAS - FS: 38.7 %
BH CV ECHO MEAS - IVS/LVPW: 0.99 CM
BH CV ECHO MEAS - IVSD: 1.07 CM
BH CV ECHO MEAS - LAT PEAK E' VEL: 7.6 CM/SEC
BH CV ECHO MEAS - LV DIASTOLIC VOL/BSA (35-75): 22.9 CM2
BH CV ECHO MEAS - LV MASS(C)D: 112.2 GRAMS
BH CV ECHO MEAS - LV MAX PG: 3.8 MMHG
BH CV ECHO MEAS - LV MEAN PG: 2.11 MMHG
BH CV ECHO MEAS - LV SYSTOLIC VOL/BSA (12-30): 7.5 CM2
BH CV ECHO MEAS - LV V1 MAX: 97.4 CM/SEC
BH CV ECHO MEAS - LV V1 VTI: 16.3 CM
BH CV ECHO MEAS - LVIDD: 3.4 CM
BH CV ECHO MEAS - LVIDS: 2.1 CM
BH CV ECHO MEAS - LVOT AREA: 2.7 CM2
BH CV ECHO MEAS - LVOT DIAM: 1.86 CM
BH CV ECHO MEAS - LVPWD: 1.08 CM
BH CV ECHO MEAS - MED PEAK E' VEL: 7.4 CM/SEC
BH CV ECHO MEAS - MV A DUR: 0.1 SEC
BH CV ECHO MEAS - MV A MAX VEL: 85.3 CM/SEC
BH CV ECHO MEAS - MV DEC SLOPE: 373.8 CM/SEC2
BH CV ECHO MEAS - MV DEC TIME: 0.23 MSEC
BH CV ECHO MEAS - MV E MAX VEL: 53.6 CM/SEC
BH CV ECHO MEAS - MV E/A: 0.63
BH CV ECHO MEAS - MV MAX PG: 3.1 MMHG
BH CV ECHO MEAS - MV MEAN PG: 1.46 MMHG
BH CV ECHO MEAS - MV P1/2T: 51.9 MSEC
BH CV ECHO MEAS - MV V2 VTI: 21 CM
BH CV ECHO MEAS - MVA(P1/2T): 4.2 CM2
BH CV ECHO MEAS - MVA(VTI): 2.1 CM2
BH CV ECHO MEAS - PA ACC TIME: 0.08 SEC
BH CV ECHO MEAS - PA PR(ACCEL): 42.2 MMHG
BH CV ECHO MEAS - PA V2 MAX: 96.4 CM/SEC
BH CV ECHO MEAS - PULM A REVS DUR: 0.08 SEC
BH CV ECHO MEAS - PULM A REVS VEL: 34.7 CM/SEC
BH CV ECHO MEAS - PULM DIAS VEL: 26.6 CM/SEC
BH CV ECHO MEAS - PULM S/D: 1.87
BH CV ECHO MEAS - PULM SYS VEL: 49.7 CM/SEC
BH CV ECHO MEAS - QP/QS: 0.78
BH CV ECHO MEAS - RAP SYSTOLE: 3 MMHG
BH CV ECHO MEAS - RV MAX PG: 2.7 MMHG
BH CV ECHO MEAS - RV V1 MAX: 82.3 CM/SEC
BH CV ECHO MEAS - RV V1 VTI: 13.9 CM
BH CV ECHO MEAS - RVOT DIAM: 1.78 CM
BH CV ECHO MEAS - RVSP: 16.8 MMHG
BH CV ECHO MEAS - SI(MOD-SP2): 12.5 ML/M2
BH CV ECHO MEAS - SI(MOD-SP4): 15.4 ML/M2
BH CV ECHO MEAS - SUP REN AO DIAM: 2 CM
BH CV ECHO MEAS - SV(LVOT): 44.3 ML
BH CV ECHO MEAS - SV(MOD-SP2): 25 ML
BH CV ECHO MEAS - SV(MOD-SP4): 31 ML
BH CV ECHO MEAS - SV(RVOT): 34.4 ML
BH CV ECHO MEAS - TAPSE (>1.6): 1.99 CM
BH CV ECHO MEAS - TR MAX PG: 13.8 MMHG
BH CV ECHO MEAS - TR MAX VEL: 186 CM/SEC
BH CV ECHO MEASUREMENTS AVERAGE E/E' RATIO: 7.15
BH CV STRESS BP STAGE 1: NORMAL
BH CV STRESS BP STAGE 2: NORMAL
BH CV STRESS DURATION MIN STAGE 1: 3
BH CV STRESS DURATION MIN STAGE 2: 3
BH CV STRESS DURATION MIN STAGE 3: 1
BH CV STRESS DURATION SEC STAGE 1: 0
BH CV STRESS DURATION SEC STAGE 2: 0
BH CV STRESS DURATION SEC STAGE 3: 0
BH CV STRESS GRADE STAGE 1: 10
BH CV STRESS GRADE STAGE 2: 12
BH CV STRESS GRADE STAGE 3: 14
BH CV STRESS HR STAGE 1: 117
BH CV STRESS HR STAGE 2: 132
BH CV STRESS HR STAGE 3: 145
BH CV STRESS METS STAGE 1: 5
BH CV STRESS METS STAGE 2: 7.5
BH CV STRESS METS STAGE 3: 8
BH CV STRESS PROTOCOL 1: NORMAL
BH CV STRESS RECOVERY BP: NORMAL MMHG
BH CV STRESS RECOVERY HR: 101 BPM
BH CV STRESS SPEED STAGE 1: 1.7
BH CV STRESS SPEED STAGE 2: 2.5
BH CV STRESS SPEED STAGE 3: 3.4
BH CV STRESS STAGE 1: 1
BH CV STRESS STAGE 2: 2
BH CV STRESS STAGE 3: 3
BH CV XLRA - RV BASE: 2.8 CM
BH CV XLRA - RV LENGTH: 6 CM
BH CV XLRA - RV MID: 2.7 CM
BH CV XLRA - TDI S': 12.8 CM/SEC
LEFT ATRIUM VOLUME INDEX: 11.2 ML/M2
MAXIMAL PREDICTED HEART RATE: 156 BPM
MAXIMAL PREDICTED HEART RATE: 156 BPM
PERCENT MAX PREDICTED HR: 92.95 %
SINUS: 2.47 CM
STJ: 2.7 CM
STRESS BASELINE BP: NORMAL MMHG
STRESS BASELINE HR: 97 BPM
STRESS PERCENT HR: 109 %
STRESS POST ESTIMATED WORKLOAD: 8 METS
STRESS POST EXERCISE DUR MIN: 7 MIN
STRESS POST EXERCISE DUR SEC: 0 SEC
STRESS POST PEAK BP: NORMAL MMHG
STRESS POST PEAK HR: 145 BPM
STRESS TARGET HR: 133 BPM
STRESS TARGET HR: 133 BPM

## 2023-02-03 PROCEDURE — 93016 CV STRESS TEST SUPVJ ONLY: CPT | Performed by: INTERNAL MEDICINE

## 2023-02-03 PROCEDURE — 93306 TTE W/DOPPLER COMPLETE: CPT

## 2023-02-03 PROCEDURE — 93306 TTE W/DOPPLER COMPLETE: CPT | Performed by: INTERNAL MEDICINE

## 2023-02-03 PROCEDURE — 93018 CV STRESS TEST I&R ONLY: CPT | Performed by: INTERNAL MEDICINE

## 2023-02-03 PROCEDURE — 93017 CV STRESS TEST TRACING ONLY: CPT

## 2023-02-03 NOTE — TELEPHONE ENCOUNTER
I spoke with Brodie Bueno and updated pt on results/recommendations from provider.  Pt verbalized understanding and has no further questions at this time.    Thank you,    Denae Saldivar, RN  [place] [facility]  02/03/23 16:20 EST

## 2023-02-03 NOTE — TELEPHONE ENCOUNTER
Please inform patient treadmill ecg is normal.  Echocardiogram is normal for her age showing normal left ventricular systolic function and no valve disease.  Okay to keep April appointment as scheduled

## 2023-02-06 NOTE — PLAN OF CARE
Goal Outcome Evaluation:           Progress: no change   No c/o pain during this shift. Pt remains on 7L HF during day, and Bipap at night. Pt repeatedly took off oxygen throughout night and was more confused but was able to answer all orientation questions. Tube feeding continued. Purewick in place. VSS, will continue to monitor closely.    Nasalis-Muscle-Based Myocutaneous Island Pedicle Flap Text: Using a #15 blade, an incision was made around the donor flap to the level of the nasalis muscle. Wide lateral undermining was then performed in both the subcutaneous plane above the nasalis muscle, and in a submuscular plane just above periosteum. This allowed the formation of a free nasalis muscle axial pedicle (based on the angular artery) which was still attached to the actual cutaneous flap, increasing its mobility and vascular viability. Hemostasis was obtained with pinpoint electrocoagulation. The flap was mobilized into position and the pivotal anchor points positioned and stabilized with buried interrupted sutures. Subcutaneous and dermal tissues were closed in a multilayered fashion with sutures. Tissue redundancies were excised, and the epidermal edges were apposed without significant tension and sutured with sutures.

## 2023-04-17 ENCOUNTER — OFFICE VISIT (OUTPATIENT)
Dept: CARDIOLOGY | Facility: CLINIC | Age: 63
End: 2023-04-17
Payer: COMMERCIAL

## 2023-04-17 VITALS
HEIGHT: 65 IN | DIASTOLIC BLOOD PRESSURE: 80 MMHG | SYSTOLIC BLOOD PRESSURE: 122 MMHG | WEIGHT: 210 LBS | HEART RATE: 103 BPM | BODY MASS INDEX: 34.99 KG/M2 | OXYGEN SATURATION: 98 %

## 2023-04-17 DIAGNOSIS — R06.09 EXERTIONAL DYSPNEA: Primary | ICD-10-CM

## 2023-04-17 PROCEDURE — 99213 OFFICE O/P EST LOW 20 MIN: CPT | Performed by: INTERNAL MEDICINE

## 2023-04-17 NOTE — PROGRESS NOTES
[facility]      Patient Name: Brodie Bueno  :1960  Age: 62 y.o.  Encounter Provider:  Cr Marrero Jr, MD      Chief Complaint:   No chief complaint on file.        HPI  Brodie Bueno is a 62 y.o. female past medical history of diabetes, hypertension and dyslipidemia who presents for follow-up evaluation of severe fatigue.     Last clinic visit note: Patient had COVID in 2021 but seems to have recovered well.  She has been noticing increased fatigue especially after activity for the last 4 to 5 months.  She did have RSV at around the time of the initiation of complaints.  She now notices more significant dyspnea on exertion and decreased exercise tolerance.  She has occasional atypical chest discomfort but no complaints that would be consistent with true angina.  She denies orthopnea, PND or edema.  No palpitations, dizziness or syncope.  Heart rate and blood pressure are well controlled today in clinic.  She has been doing a good job on glucose control and A1c has decreased significantly.  Her mother had a history of CABG in her 60s and her father suffered a fatal MI in his 80s.  She is a lifelong non-smoker drinks socially and denies illicit drug use.  No cardiac complaints at time of interview.    Normal treadmill stress study with no ECG evidence of ischemia and ability to perform for 7 minutes on Virgilio treadmill protocol.  Normal echocardiogram.  Patient has persistent shortness of breath that is chronic and stable since RSV infection.  She is unsure if she has follow-up in the pulmonary office.  No orthopnea, PND or edema.  No angina.  Social and family work was reviewed and is not pertinent to this clinic visit.    The following portions of the patient's history were reviewed and updated as appropriate: allergies, current medications, past family history, past medical history, past social history, past surgical history and problem list.      Review of  "Systems   Constitutional: Positive for malaise/fatigue. Negative for chills and fever.   HENT: Negative for hoarse voice and sore throat.    Eyes: Negative for double vision and photophobia.   Cardiovascular: Positive for dyspnea on exertion. Negative for chest pain, leg swelling, near-syncope, orthopnea, palpitations, paroxysmal nocturnal dyspnea and syncope.   Respiratory: Negative for cough and wheezing.    Skin: Negative for poor wound healing and rash.   Musculoskeletal: Negative for arthritis and joint swelling.   Gastrointestinal: Negative for bloating, abdominal pain, hematemesis and hematochezia.   Neurological: Negative for dizziness and focal weakness.   Psychiatric/Behavioral: Negative for depression and suicidal ideas.       OBJECTIVE:   Vital Signs  Vitals:    04/17/23 1355   BP: 122/80   Pulse: 103   SpO2: 98%     Estimated body mass index is 34.95 kg/m² as calculated from the following:    Height as of this encounter: 165.1 cm (65\").    Weight as of this encounter: 95.3 kg (210 lb).    Vitals reviewed.   Constitutional:       Appearance: Healthy appearance. Not in distress.   Neck:      Vascular: No JVR. JVD normal.   Pulmonary:      Effort: Pulmonary effort is normal.      Breath sounds: Normal breath sounds. No wheezing. No rhonchi. No rales.   Chest:      Chest wall: Not tender to palpatation.   Cardiovascular:      PMI at left midclavicular line. Normal rate. Regular rhythm. Normal S1. Normal S2.      Murmurs: There is no murmur.      No gallop. No click. No rub.   Pulses:     Intact distal pulses.   Edema:     Peripheral edema absent.   Abdominal:      General: Bowel sounds are normal.      Palpations: Abdomen is soft.      Tenderness: There is no abdominal tenderness.   Musculoskeletal: Normal range of motion.         General: No tenderness. Skin:     General: Skin is warm and dry.   Neurological:      General: No focal deficit present.      Mental Status: Alert and oriented to person, place " and time.         Procedures          ASSESSMENT:     62-year-old female with diabetes, hypertension and dyslipidemia presents for evaluation of exertional dyspnea    PLAN OF CARE:     1. Exertional dyspnea -normal stress study and echo.  All symptoms persistent since RSV infection.  We will call to make sure she has pulmonary follow-up.  2. Essential hypertension -seemingly well controlled.  Continue sodium restricted diet and twice daily blood pressure log.  3. Diabetes  4. Dyslipidemia    Return to clinic 6 months             Discharge Medications          Accurate as of April 17, 2023  1:57 PM. If you have any questions, ask your nurse or doctor.            Continue These Medications      Instructions Start Date   albuterol sulfate  (90 Base) MCG/ACT inhaler  Commonly known as: PROVENTIL HFA;VENTOLIN HFA;PROAIR HFA   2 puffs, Inhalation, Every 4 Hours PRN      amLODIPine 2.5 MG tablet  Commonly known as: NORVASC   2.5 mg, Oral, Every Night at Bedtime      aspirin 81 MG EC tablet   Every Other Day      celecoxib 200 MG capsule  Commonly known as: CeleBREX   200 mg, Oral, Daily      CLARITIN PO   Oral      dexlansoprazole 60 MG capsule  Commonly known as: DEXILANT   Daily      fluticasone 50 MCG/ACT nasal spray  Commonly known as: FLONASE   3 sprays, Nasal, Daily      FreeStyle Allison 14 Day Sensor misc   CHANGE EVERY 14 DAYS      gabapentin 100 MG capsule  Commonly known as: NEURONTIN   Take 1 tab QAM and 2 tabs QHS PO      gabapentin 300 MG capsule  Commonly known as: NEURONTIN   No dose, route, or frequency recorded.      indapamide 1.25 MG tablet  Commonly known as: LOZOL   1.25 mg, Oral, Every Morning      insulin glargine 100 UNIT/ML injection  Commonly known as: LANTUS SEMGLEE   10 Units, Subcutaneous, Every 12 Hours Scheduled      Invokana 100 MG tablet tablet  Generic drug: Canagliflozin   1 tablet, Oral, Daily      lansoprazole 30 MG capsule  Commonly known as: PREVACID   TAKE ONE CAPSULE BY MOUTH  EVERY DAY      levocetirizine 5 MG tablet  Commonly known as: XYZAL   5 mg, Oral, Every Evening      ondansetron ODT 4 MG disintegrating tablet  Commonly known as: Zofran ODT   4 mg, Oral, Every 8 Hours PRN      potassium chloride 10 MEQ CR tablet   2 Times Daily      rosuvastatin 10 MG tablet  Commonly known as: CRESTOR   5 mg, Nightly      Thyroid 32.5 MG tablet  Commonly known as: ARMOUR   32.5 mg, Oral, Daily      Trelegy Ellipta 200-62.5-25 MCG/ACT aerosol powder   Generic drug: Fluticasone-Umeclidin-Vilant   1 puff, Daily      vitamin B-12 1000 MCG tablet  Commonly known as: CYANOCOBALAMIN   5,000 mcg, Oral, Daily      Vitamin D3 50 MCG (2000 UT) capsule   1,000 Units, Oral      Vyvanse 30 MG capsule  Generic drug: lisdexamfetamine   30 mg, Oral, Daily             Thank you for allowing me to participate in the care of your patient,      Sincerely,   Cr Marrero MD  [facility]  04/17/23  13:57 EDT

## 2023-11-01 ENCOUNTER — OFFICE VISIT (OUTPATIENT)
Dept: CARDIOLOGY | Facility: CLINIC | Age: 63
End: 2023-11-01
Payer: MEDICARE

## 2023-11-01 VITALS
HEART RATE: 77 BPM | BODY MASS INDEX: 36.15 KG/M2 | DIASTOLIC BLOOD PRESSURE: 80 MMHG | HEIGHT: 65 IN | WEIGHT: 217 LBS | SYSTOLIC BLOOD PRESSURE: 128 MMHG

## 2023-11-01 DIAGNOSIS — E11.9 TYPE 2 DIABETES MELLITUS WITHOUT COMPLICATION, WITHOUT LONG-TERM CURRENT USE OF INSULIN: ICD-10-CM

## 2023-11-01 DIAGNOSIS — R06.09 EXERTIONAL DYSPNEA: ICD-10-CM

## 2023-11-01 DIAGNOSIS — I10 PRIMARY HYPERTENSION: ICD-10-CM

## 2023-11-01 DIAGNOSIS — G47.33 OSA ON CPAP: Primary | ICD-10-CM

## 2023-11-01 RX ORDER — PYRIDOXINE HCL (VITAMIN B6) 100 MG
100 TABLET ORAL DAILY
COMMUNITY
Start: 2023-08-09

## 2023-11-01 RX ORDER — LANOLIN ALCOHOL/MO/W.PET/CERES
3 CREAM (GRAM) TOPICAL NIGHTLY
COMMUNITY
Start: 2023-09-09

## 2023-11-01 RX ORDER — SODIUM CHLORIDE/ALOE VERA
GEL (GRAM) NASAL
COMMUNITY

## 2023-11-01 RX ORDER — MAGNESIUM GLYCINATE 100 MG
CAPSULE ORAL DAILY
COMMUNITY

## 2023-11-01 NOTE — PROGRESS NOTES
Date of Office Visit: 23  Encounter Provider: MADHURI Zhang  Place of Service: [facility] CARDIOLOGY  Patient Name: Brodie Bueno  :1960    Chief Complaint   Patient presents with    Essential hypertension    Shortness of Breath    Sleep Apnea    Follow-up   :     HPI: Brodie Bueno is a 63 y.o. female  with hypertension, diabetes mellitus, obstructive sleep apnea, hypothyroidism, obesity, GERD, history of palpitations.    She had palpitations in   That were waking her up from sleep and wore a 24-hour Holter monitor which showed rare PVCs, rare PACs but no significant arrhythmia.  Average heart rate was 96 bpm.      She later had a nonischemic stress echo in 2017.  She had normal LV systolic function, grade 1 diastolic dysfunction, no significant valvular disease and normal RVSP.  She was advised to have a sleep study.    She has not complained of dyspnea on exertion and had follow-up echo 2023 showed normal left ventricular systolic function, grade 1 diastolic dysfunction, no significant valve disease.  Treadmill ECG stress test showed no evidence of ischemia was low risk for ischemic heart disease.  She presents today for reassessment.  She continues to complain of dyspnea on exertion and states this seems a little worse.  She had no wheezing.  No cough or fever.  She was stationary bike for 15 to 20 minutes 3 times a week and at the end of that activity she feels a little tired but she has not had any symptoms with her routine exercise.  She denies chest pain.  She has rare palpitations lasting just a few seconds but this occurs when she gets anxious.  She denies nighttime palpitations waking her up from sleep.  She is compliant with CPAP.  She states she has an appoint with her PCP today as well      Allergies   Allergen Reactions    Codeine GI Intolerance     vomiting    Lactose Intolerance (Gi) Diarrhea and GI Intolerance    Metformin  "Nausea And Vomiting    Morphine And Related Other (See Comments)     Does not recall    Amoxicillin Rash     All penicillin family    Hctz [Hydrochlorothiazide] Rash           Family and social history reviewed.     ROS  All other systems were reviewed and are negative          Objective:     Vitals:    11/01/23 0913   BP: 128/80   BP Location: Left arm   Patient Position: Sitting   Pulse: 77   Weight: 98.4 kg (217 lb)   Height: 165.1 cm (65\")     Body mass index is 36.11 kg/m².    PHYSICAL EXAM:  Pulmonary:      Effort: Pulmonary effort is normal.      Breath sounds: Examination of the right-lower field reveals decreased breath sounds. Examination of the left-lower field reveals decreased breath sounds. Decreased breath sounds present.   Cardiovascular:      Normal rate. Regular rhythm.           ECG 12 Lead    Date/Time: 11/1/2023 9:35 AM  Performed by: Shelley Sloan APRN    Authorized by: Shelley Sloan APRN  Comparison: compared with previous ECG   Similar to previous ECG  Rhythm: sinus rhythm  Rate: normal  Q waves: III    T flattening: III, aVF and V1  QRS axis: normal  Comments: No significant change             Current Outpatient Medications   Medication Sig Dispense Refill    Alpha-Lipoic Acid 100 MG tablet Take  by mouth Daily.      amLODIPine (NORVASC) 2.5 MG tablet Take 1 tablet by mouth every night at bedtime.      ASHWAGANDHA PO Take  by mouth Daily.      aspirin 81 MG EC tablet Every Other Day.      Cholecalciferol (VITAMIN D3) 2000 UNITS capsule Take 1,000 Units by mouth.      Continuous Blood Gluc Sensor (FreeStyle Allison 14 Day Sensor) misc CHANGE EVERY 14 DAYS      dexlansoprazole (DEXILANT) 60 MG capsule Daily.      gabapentin (NEURONTIN) 100 MG capsule Take 1 tab QAM and 2 tabs QHS PO 9 capsule 0    gabapentin (NEURONTIN) 300 MG capsule       indapamide (LOZOL) 1.25 MG tablet Take 1 tablet by mouth Every Morning.      levocetirizine (XYZAL) 5 MG tablet Take 1 tablet by mouth Every Evening.      " Loratadine (CLARITIN PO) Take  by mouth.      Magnesium Glycinate 665 MG capsule Take  by mouth Daily.      melatonin 3 MG tablet Take 1 tablet by mouth Every Night.      potassium chloride 10 MEQ CR tablet 2 (Two) Times a Day.      pyridoxine (VITAMIN B-6) 100 MG tablet Take 1 tablet by mouth Daily.      rosuvastatin (CRESTOR) 10 MG tablet 0.5 tablets Every Night.      saline (AYR) gel nasal gel into the nostril(s) as directed by provider.      Thyroid 32.5 MG PO tablet Take 1 tablet by mouth Daily.      Trelegy Ellipta 200-62.5-25 MCG/ACT aerosol powder  1 puff Daily.      vitamin B-12 (CYANOCOBALAMIN) 1000 MCG tablet Take 5 tablets by mouth Daily.       No current facility-administered medications for this visit.     Assessment:       Diagnosis Plan   1. Exertional dyspnea  ECG 12 Lead    CT Chest Without Contrast           Orders Placed This Encounter   Procedures    CT Chest Without Contrast     Standing Status:   Future     Standing Expiration Date:   11/1/2024     Order Specific Question:   Does this exam require Nathanael Bronch Protocol?     Answer:   No     Order Specific Question:   Release to patient     Answer:   Routine Release [[id number]]    ECG 12 Lead     This order was created via procedure documentation     Order Specific Question:   Release to patient     Answer:   Routine Release [[id number]]         Plan:       1.  63-year-old female with dyspnea on exertion-no EKG change.  Echocardiogram had no significant findings February 2023.  Treadmill ECG at that time was nonischemic.  Offered repeat chest x-ray versus CT chest without contrast and patient would prefer a CT since she has had ongoing intermittent shortness of breath for almost a year now.    2.  Diabetes mellitus type 2.  Hemoglobin A1c was 7.4 just 2 months ago in August 3 obstructive sleep apnea reportedly compliant with CPAP  4.  Hyperlipidemia on rosuvastatin.  Her LDL was 49 in June.  HDL was 37  5.  Hypothyroidism on replacement  therapy.  TSH normal June 2023  6.  Hypertension blood pressure appears well controlled  7.  Obesity  8.GERD  9 .  Obstructive sleep apnea, reports compliance with CPAP  10.  Palpitations-these are rare and occurs with anxiety lasting just a few seconds.  She will let me know if this becomes more frequent and we will consider repeat Holter monitoring    Follow-up in 6 months and call with changes        It has been a pleasure to participate in this patient's care.      Thank you,  MADHURI Zhang      **I used Dragon to dictate this note:**

## 2023-11-24 ENCOUNTER — HOSPITAL ENCOUNTER (OUTPATIENT)
Facility: HOSPITAL | Age: 63
Discharge: HOME | End: 2023-11-24
Admitting: NURSE PRACTITIONER
Payer: MEDICARE

## 2023-11-24 DIAGNOSIS — R06.09 EXERTIONAL DYSPNEA: ICD-10-CM

## 2023-11-24 PROCEDURE — 71250 CT THORAX DX C-: CPT

## 2023-11-29 ENCOUNTER — TELEPHONE (OUTPATIENT)
Dept: CARDIOLOGY | Facility: CLINIC | Age: 63
End: 2023-11-29
Payer: MEDICARE

## 2023-11-29 DIAGNOSIS — R91.1 INCIDENTAL LUNG NODULE, > 3MM AND < 8MM: Primary | ICD-10-CM

## 2023-11-29 NOTE — TELEPHONE ENCOUNTER
Called and discussed CT results with patient.  We discussed  1. Small sliding-type hiatal hernia.  2. Subtle infiltrates within the upper lobes and right middle lobe as  described above.  3. 4 mm noncalcified pleural-based left lower lobe pulmonary nodule.  We will plan repeat CT chest in 1 year.  Patient verbalized understanding and had no additional questions or concerns.

## 2024-05-21 ENCOUNTER — TELEPHONE (OUTPATIENT)
Dept: ORTHOPEDIC SURGERY | Facility: CLINIC | Age: 64
End: 2024-05-21

## 2024-05-21 ENCOUNTER — OFFICE VISIT (OUTPATIENT)
Dept: ORTHOPEDIC SURGERY | Facility: CLINIC | Age: 64
End: 2024-05-21
Payer: MEDICARE

## 2024-05-21 VITALS — HEIGHT: 65 IN | TEMPERATURE: 98.4 F | BODY MASS INDEX: 34.99 KG/M2 | WEIGHT: 210 LBS

## 2024-05-21 DIAGNOSIS — M25.532 BILATERAL WRIST PAIN: Primary | ICD-10-CM

## 2024-05-21 DIAGNOSIS — M25.531 BILATERAL WRIST PAIN: Primary | ICD-10-CM

## 2024-05-21 DIAGNOSIS — S52.592A OTHER CLOSED FRACTURE OF DISTAL END OF LEFT RADIUS, INITIAL ENCOUNTER: ICD-10-CM

## 2024-05-21 RX ORDER — ALBUTEROL SULFATE 90 UG/1
AEROSOL, METERED RESPIRATORY (INHALATION) SEE ADMIN INSTRUCTIONS
COMMUNITY
Start: 2024-04-30

## 2024-05-21 RX ORDER — AMLODIPINE, VALSARTAN AND HYDROCHLOROTHIAZIDE 5; 160; 12.5 MG/1; MG/1; MG/1
TABLET ORAL
COMMUNITY
Start: 2024-04-01

## 2024-05-21 NOTE — PROGRESS NOTES
Patient Name: Brodie Bueno   YOB: 1960  Referring Primary Care Physician: Angie Olivarez MD  BMI: Body mass index is 34.95 kg/m².    Chief Complaint:    Chief Complaint   Patient presents with    Left Wrist - Pain    Right Wrist - Pain        HPI: New pt here for both wrist hurting and she fell on outstretched wrist cleaning out her garage May 1   Bone density scan was wnl last month  Ambidextrous   Pt has long covid   Retired  Brodie Bueno is a 63 y.o. female who presents today for evaluation of   Chief Complaint   Patient presents with    Left Wrist - Pain    Right Wrist - Pain         Subjective   Medications:   Home Medications:  Current Outpatient Medications on File Prior to Visit   Medication Sig    albuterol sulfate  (90 Base) MCG/ACT inhaler Inhale See Admin Instructions. Inhale 2 puffs by mouth every 4 to 6 hours as needed    Alpha-Lipoic Acid 100 MG tablet Take  by mouth Daily.    amLODIPine-Valsartan-HCTZ 5-160-12.5 MG tablet     Cholecalciferol (VITAMIN D3) 2000 UNITS capsule Take 1,000 Units by mouth.    dexlansoprazole (DEXILANT) 60 MG capsule Daily.    gabapentin (NEURONTIN) 100 MG capsule Take 1 tab QAM and 2 tabs QHS PO    levocetirizine (XYZAL) 5 MG tablet Take 1 tablet by mouth Every Evening.    Magnesium Glycinate 665 MG capsule Take  by mouth Daily.    pyridoxine (VITAMIN B-6) 100 MG tablet Take 1 tablet by mouth Daily.    saline (AYR) gel nasal gel into the nostril(s) as directed by provider.    Thyroid 32.5 MG PO tablet Take 1 tablet by mouth Daily.    Unable to find AM/GA/PIR/KE/PRIL COMPOUNDED CREAM    Unable to find Take 7 mg by mouth Daily. Naltrexone LOW DOSE compounded tablet 7mg    vitamin B-12 (CYANOCOBALAMIN) 1000 MCG tablet Take 5 tablets by mouth Daily.    amLODIPine (NORVASC) 2.5 MG tablet Take 1 tablet by mouth every night at bedtime.    ASHWAGANDHA PO Take  by mouth Daily.    aspirin 81 MG EC tablet Every Other Day. (Patient not taking:  Reported on 5/21/2024)    Continuous Blood Gluc Sensor (FreeStyle Allison 14 Day Sensor) misc CHANGE EVERY 14 DAYS    gabapentin (NEURONTIN) 300 MG capsule     indapamide (LOZOL) 1.25 MG tablet Take 1 tablet by mouth Every Morning.    Loratadine (CLARITIN PO) Take  by mouth.    melatonin 3 MG tablet Take 1 tablet by mouth Every Night. (Patient not taking: Reported on 5/21/2024)    potassium chloride 10 MEQ CR tablet 2 (Two) Times a Day. (Patient not taking: Reported on 5/21/2024)    rosuvastatin (CRESTOR) 10 MG tablet 0.5 tablets Every Night.    Trelegy Ellipta 200-62.5-25 MCG/ACT aerosol powder  1 puff Daily. (Patient not taking: Reported on 5/21/2024)     No current facility-administered medications on file prior to visit.     Current Medications:  Scheduled Meds:  Continuous Infusions:No current facility-administered medications for this visit.    PRN Meds:.    I have reviewed the patient's medical history in detail and updated the computerized patient record.  Review and summarization of old records includes:    Past Medical History:   Diagnosis Date    Cough     Fibromyalgia     Frequent urination     Gallbladder disease     GERD (gastroesophageal reflux disease)     Hemorrhoids     Hiatal hernia     small, sliding 11/2023 on CT chest    Hyperlipidemia     Hypersomnolence     Hypertension     Incidental lung nodule, > 3mm and < 8mm     on CT 11/2023    Nasal congestion     JENAE (obstructive sleep apnea)     mild-severe    Postnasal drip     Sleep apnea     Snoring     Subcutaneous abscess     Weight gain         Past Surgical History:   Procedure Laterality Date    CHOLECYSTECTOMY      COLONOSCOPY  approx 2009    normal per patient    LYMPH NODE BIOPSY      UPPER GASTROINTESTINAL ENDOSCOPY  approx 2011    gerd per patient        Social History     Occupational History    Not on file   Tobacco Use    Smoking status: Never    Smokeless tobacco: Never    Tobacco comments:     Caffeine - 2 cups coffee daily    Vaping  Use    Vaping status: Never Used   Substance and Sexual Activity    Alcohol use: Yes     Comment: social    Drug use: No    Sexual activity: Not Currently      Social History     Social History Narrative    Not on file        Family History   Problem Relation Age of Onset    Hypertension Mother     Lung cancer Mother     Heart disease Mother     Stroke Mother     Heart disease Father     Hypertension Sister     Hypertension Brother     Autoimmune disease Other     Heart disease Other         cardiac disorder    Hypertension Other     Thyroid disease Other     Obesity Other     Prostate cancer Other     Myasthenia gravis Other     Vitiligo Other        ROS: 14 point review of systems was performed and all other systems were reviewed and are negative except for documented findings in HPI and today's encounter.     Allergies:   Allergies   Allergen Reactions    Codeine GI Intolerance     vomiting    Lactose Intolerance (Gi) Diarrhea and GI Intolerance    Metformin Nausea And Vomiting    Morphine And Related Other (See Comments)     Does not recall    Amoxicillin Rash     All penicillin family    Hctz [Hydrochlorothiazide] Rash     Constitutional:  Denies fever, shaking or chills   Eyes:  Denies change in visual acuity   HENT:  Denies nasal congestion or sore throat   Respiratory:  Denies cough or shortness of breath   Cardiovascular:  Denies chest pain or severe LE edema   GI:  Denies abdominal pain, nausea, vomiting, bloody stools or diarrhea   Musculoskeletal:  Numbness, tingling, pain, or loss of motor function only as noted above in history of present illness.  : Denies painful urination or hematuria  Integument:  Denies rash, lesion or ulceration   Neurologic:  Denies headache or focal weakness  Endocrine:  Denies lymphadenopathy  Psych:  Denies confusion or change in mental status   Hem:  Denies active bleeding    OBJECTIVE:  Physical Exam: 63 y.o. female  Wt Readings from Last 3 Encounters:   05/21/24 95.3 kg  "(210 lb)   11/01/23 98.4 kg (217 lb)   04/17/23 95.3 kg (210 lb)     Ht Readings from Last 1 Encounters:   05/21/24 165.1 cm (65\")     Body mass index is 34.95 kg/m².  Vitals:    05/21/24 0919   Temp: 98.4 °F (36.9 °C)     Vital signs reviewed.     General Appearance:    Alert, cooperative, in no acute distress                  Eyes: conjunctiva clear  ENT: external ears and nose atraumatic  CV: no peripheral edema  Resp: normal respiratory effort  Skin: no rashes or wounds; normal turgor  Psych: mood and affect appropriate  Lymph: no nodes appreciated  Neuro: gross sensation intact  Vascular:  Palpable peripheral pulse in noted extremity  Musculoskeletal Extremities: Point tenderness over the distal radius on the left with no obvious deformity or angulation scaphoid is nontender to both wrist right distal radius nontender elbows are nontender bilaterally skin is warm dry intact with good pulses movement and sensation    Radiology:   Bilateral wrist x-rays were done for pain and fall  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 Bilateral wrist pain  M25.531 719.43    M25.532    2. Other closed fracture of distal end of left radius, initial encounter  S52.592A 813.42        Procedures  Splint left   Right wrist splint with thumb   Recheck x-rays in 2 weeks no use of left upper extremity rest ice elevate compress  MDM/Plan:   The diagnosis(es), natural history, pathophysiology and treatment for diagnosis(es) were discussed. Opportunity given and questions answered.  Biomechanics of pertinent body areas discussed.  When appropriate, the use of ambulatory aids discussed.  EXERCISES:  Advice on benefits of, and types of regular/moderate exercise pertaining to orthopedic diagnosis(es).  MEDICATIONS:  The risks, benefits, warnings,side effects and alternatives of medications discussed.  Inflammation/pain control; with cold, heat, elevation " and/or liniments discussed as appropriate  MEDICAL RECORDS reviewed from other provider(s) for past and current medical history pertinent to this complaint.      5/21/2024    Much of this encounter note is an electronic transcription/translation of spoken language to printed text. The electronic translation of spoken language may permit erroneous, or at times, nonsensical words or phrases to be inadvertently transcribed; Although I have reviewed the note for such errors, some may still exist

## 2024-05-21 NOTE — TELEPHONE ENCOUNTER
Caller: Brodie Rowland    Relationship to patient: Self    Best call back number: [phone]*    Patient is needing: PT IS WANTING TO KNOW IF THERE IS ANYWAY TO BE SEEN SOONER THAN HER APPOINTMENT ON 6/10/24.. PLEASE ADVISE..

## 2024-05-24 ENCOUNTER — PATIENT ROUNDING (BHMG ONLY) (OUTPATIENT)
Dept: ORTHOPEDIC SURGERY | Facility: CLINIC | Age: 64
End: 2024-05-24
Payer: MEDICARE

## 2024-05-24 NOTE — PROGRESS NOTES
A [employer] Message has been sent to the patient for PATIENT ROUNDING with [facility] [place]

## 2024-05-29 ENCOUNTER — OFFICE VISIT (OUTPATIENT)
Age: 64
End: 2024-05-29
Payer: MEDICARE

## 2024-05-29 VITALS — TEMPERATURE: 96.6 F | HEIGHT: 65 IN | WEIGHT: 214 LBS | BODY MASS INDEX: 35.65 KG/M2

## 2024-05-29 DIAGNOSIS — R29.898 ANKLE WEAKNESS: ICD-10-CM

## 2024-05-29 DIAGNOSIS — M79.671 PAIN IN BOTH FEET: ICD-10-CM

## 2024-05-29 DIAGNOSIS — S93.609A SPRAIN OF FOOT, UNSPECIFIED LATERALITY, INITIAL ENCOUNTER: ICD-10-CM

## 2024-05-29 DIAGNOSIS — M25.572 ACUTE BILATERAL ANKLE PAIN: ICD-10-CM

## 2024-05-29 DIAGNOSIS — M21.42 PES PLANUS OF BOTH FEET: ICD-10-CM

## 2024-05-29 DIAGNOSIS — M25.571 ACUTE BILATERAL ANKLE PAIN: ICD-10-CM

## 2024-05-29 DIAGNOSIS — M79.672 PAIN IN BOTH FEET: ICD-10-CM

## 2024-05-29 DIAGNOSIS — M21.41 PES PLANUS OF BOTH FEET: ICD-10-CM

## 2024-05-29 PROCEDURE — 1159F MED LIST DOCD IN RCRD: CPT | Performed by: STUDENT IN AN ORGANIZED HEALTH CARE EDUCATION/TRAINING PROGRAM

## 2024-05-29 PROCEDURE — 1160F RVW MEDS BY RX/DR IN RCRD: CPT | Performed by: STUDENT IN AN ORGANIZED HEALTH CARE EDUCATION/TRAINING PROGRAM

## 2024-05-29 PROCEDURE — 99203 OFFICE O/P NEW LOW 30 MIN: CPT | Performed by: STUDENT IN AN ORGANIZED HEALTH CARE EDUCATION/TRAINING PROGRAM

## 2024-05-29 NOTE — PROGRESS NOTES
"Chief Complaint   Patient presents with    Left Ankle - Initial Evaluation    Right Foot - Initial Evaluation       History of Present Illness  Brodie is a 63 y.o. year old female here today for left ankle and right foot pain that has been present since 5/1/24 when she tripped over a box and then fell straight forward landing on her hand/wrists to catch herself. She is currently being managed by Maria Isabel Oneill for bilateral wrist injuries.   Pain is currently rated 7/10 and described as a   Left ankle pain is located across the top and up to her knee  Right foot pain starts along the medial arch and then goes across the foot with pain up to knee  Admits to associated swelling and stiffness.  Denies any numbness or tingling.   Pain worsens with increased activity  Has been managing symptoms with elevation and Tylenol as needed  Takes gabapentin for fibromyalgia and Naltrexone coumpound tablet for chronic pain.   Denies any previous injury or occurrence. She does have orthotics that she wears with closed toed shoes.     Review of Systems   All other systems reviewed and are negative.      Temp 96.6 °F (35.9 °C)   Ht 165.1 cm (65\")   Wt 97.1 kg (214 lb)   BMI 35.61 kg/m²        Physical Exam  Vital signs reviewed.   General: Well developed, well nourished; No acute distress.  Eyes: conjunctiva clear; pupils equally round and reactive  ENT: external ears and nose atraumatic; hearing normal  CV: no peripheral edema, 2+ distal pulses  Resp: normal respiratory effort, no use of accessory muscles  Skin: normal color and pigmentation; no rashes or wounds; normal turgor  Psych: alert and oriented; mood and affect appropriate; recent and remote memory intact  Neuro: sensation to light touch intact    MSK Exam:  The bilateral feet and ankles are without obvious signs of acute bony deformity, swelling, erythema or ecchymosis. There is no tenderness to the medial joint line. There is no tenderness to the lateral joint line. There " is no bony crepitus or step-off. There is no significant tenderness at the distal posterior tibial tendon on the right, though this is a region of reported pain. There is diffuse midfoot tenderness, but no focal bony findings. No forefoot tenderness. Ankle active range of motion is mildly limited. Passive range of motion is symmetrical. Instability test are negative with anterior drawer, talar tilt and eversion stress tests. Tibia-fibula squeeze, calcaneal squeeze, and forefoot squeeze tests are negative. Strength is weak bilaterally, worse with inversion and eversion and right worse than left. There is pes planus bilaterally and pronation with ambulation. Proprioception is poor.  Single leg stance and toe raises are weak.     Bilateral Ankle X-Ray  Indication: Pain  Views: Bilateral AP and Mortise  Findings:  No fracture  No bony lesion  Soft tissues normal  Normal joint spaces  No prior studies available for comparison.    Bilateral Foot X-Ray  Indication: Pain  AP, Lateral, and Oblique views  Findings:  No fracture  No bony lesion  Normal soft tissues  Normal joint spaces  Bilateral plantar calcaneal spur  Midfoot degenerative changes, worse at he TMT joint bilaterally  No prior studies were available for comparison.      Assessment and Plan  Diagnoses and all orders for this visit:    1. Acute bilateral ankle pain  -     Ambulatory Referral to Physical Therapy    2. Pain in both feet  -     Ambulatory Referral to Physical Therapy    3. Sprain of foot, unspecified laterality, initial encounter  -     Ambulatory Referral to Physical Therapy    4. Ankle weakness  -     Ambulatory Referral to Physical Therapy    5. Pes planus of both feet  -     Ambulatory Referral to Physical Therapy    Brodie is a 63 y.o. year old female here today for left ankle and right foot pain that has been present since 5/1/24 when she tripped over a box and then fell straight forward landing on her hand/wrists to catch herself. We reviewed  images and exam findings. Initial x-rays show degenerative changes of the midfoot bilaterally, worse at the TMT joints, along with bilateral plantar calcaneal spurs.  History and exam consistent with foot sprain with signs of ankle strain/tendinitis. Her symptoms are likely worsened by her pes planus and underlying ankle weakness.  I recommended conservative management. An order was provided for physical therapy to work on range of motion and strengthening.  She may continue with previously prescribed pain medication as needed.  I stressed the importance of appropriate footwear to provide adequate support.  Should avoid being barefoot, as well as shoes that lack support, such as sandals, slides, flip-flops, flats, loafers, heels, etc. I recommended more consistent use of orthotics for additional support. She may continue with activity as tolerated but should avoid painful or overly strenuous activity. We will follow-up in 4 weeks. All of her questions were answered and she is agreeable with the plan.    Dictated utilizing Dragon dictation.

## 2024-06-04 ENCOUNTER — OFFICE VISIT (OUTPATIENT)
Dept: ORTHOPEDIC SURGERY | Facility: CLINIC | Age: 64
End: 2024-06-04
Payer: MEDICARE

## 2024-06-04 VITALS — HEIGHT: 65 IN | BODY MASS INDEX: 35.32 KG/M2 | TEMPERATURE: 98.6 F | WEIGHT: 212 LBS

## 2024-06-04 DIAGNOSIS — Z09 FOLLOW-UP EXAM: Primary | ICD-10-CM

## 2024-06-04 DIAGNOSIS — S52.531A CLOSED COLLES' FRACTURE OF RIGHT RADIUS, INITIAL ENCOUNTER: ICD-10-CM

## 2024-06-04 RX ORDER — AMLODIPINE AND VALSARTAN 5; 160 MG/1; MG/1
1 TABLET ORAL DAILY
COMMUNITY

## 2024-06-05 NOTE — PROGRESS NOTES
Patient Name: Brodie Chávez   YOB: 1960  Referring Primary Care Physician: Angie Olivarez MD  BMI: Body mass index is 35.28 kg/m².    Chief Complaint:    Chief Complaint   Patient presents with    Right Wrist - Pain, Follow-up    Left Wrist - Pain, Follow-up        HPI: follow up xrays on left wrist after a fall May 1 and I placed her in a splint for distal radius fracture. Pt has been in splint and doing well. Pain improved.    Brodie Chávez is a 63 y.o. female who presents today for evaluation of   Chief Complaint   Patient presents with    Right Wrist - Pain, Follow-up    Left Wrist - Pain, Follow-up         Subjective   Medications:   Home Medications:  Current Outpatient Medications on File Prior to Visit   Medication Sig    albuterol sulfate  (90 Base) MCG/ACT inhaler Inhale See Admin Instructions. Inhale 2 puffs by mouth every 4 to 6 hours as needed    Alpha-Lipoic Acid 100 MG tablet Take  by mouth Daily.    amLODIPine-valsartan (EXFORGE) 5-160 MG per tablet Take 1 tablet by mouth Daily.    Cholecalciferol (VITAMIN D3) 2000 UNITS capsule Take 1,000 Units by mouth.    dexlansoprazole (DEXILANT) 60 MG capsule Daily.    gabapentin (NEURONTIN) 100 MG capsule Take 1 tab QAM and 2 tabs QHS PO    levocetirizine (XYZAL) 5 MG tablet Take 1 tablet by mouth Every Evening.    Magnesium Glycinate 665 MG capsule Take  by mouth Daily.    pyridoxine (VITAMIN B-6) 100 MG tablet Take 1 tablet by mouth Daily.    saline (AYR) gel nasal gel into the nostril(s) as directed by provider.    Thyroid 32.5 MG PO tablet Take 1 tablet by mouth Daily.    Trelegy Ellipta 200-62.5-25 MCG/ACT aerosol powder  1 puff Daily.    Unable to find AM/GA/PIR/KE/PRIL COMPOUNDED CREAM    Unable to find Take 7 mg by mouth Daily. Naltrexone LOW DOSE compounded tablet 7mg    vitamin B-12 (CYANOCOBALAMIN) 1000 MCG tablet Take 5 tablets by mouth Daily.    amLODIPine (NORVASC) 2.5 MG tablet Take 1 tablet by mouth every  night at bedtime.    amLODIPine-Valsartan-HCTZ 5-160-12.5 MG tablet  (Patient not taking: Reported on 6/4/2024)    ASHWAGANDHA PO Take  by mouth Daily.    aspirin 81 MG EC tablet Every Other Day. (Patient not taking: Reported on 5/21/2024)    Continuous Blood Gluc Sensor (FreeStyle Allison 14 Day Sensor) misc CHANGE EVERY 14 DAYS    gabapentin (NEURONTIN) 300 MG capsule     indapamide (LOZOL) 1.25 MG tablet Take 1 tablet by mouth Every Morning.    Loratadine (CLARITIN PO) Take  by mouth.    melatonin 3 MG tablet Take 1 tablet by mouth Every Night. (Patient not taking: Reported on 5/21/2024)    potassium chloride 10 MEQ CR tablet 2 (Two) Times a Day. (Patient not taking: Reported on 5/21/2024)    rosuvastatin (CRESTOR) 10 MG tablet 0.5 tablets Every Night.     No current facility-administered medications on file prior to visit.     Current Medications:  Scheduled Meds:  Continuous Infusions:No current facility-administered medications for this visit.    PRN Meds:.    I have reviewed the patient's medical history in detail and updated the computerized patient record.  Review and summarization of old records includes:    Past Medical History:   Diagnosis Date    Ankle sprain Over 10 years ago    Wore boot for about 3-4 wks    Arthritis of back 2014    APPROX GUESS    Arthritis of neck 2024    Was going to PT    Cough     Fibromyalgia     Fracture of wrist 5/1/2024    Currently wearing splints    Frequent urination     Gallbladder disease     GERD (gastroesophageal reflux disease)     Hemorrhoids     Hiatal hernia     small, sliding 11/2023 on CT chest    Hyperlipidemia     Hypersomnolence     Hypertension     Incidental lung nodule, > 3mm and < 8mm     on CT 11/2023    Low back strain 2014    Af    Nasal congestion     Neck strain 2024    Same as the arthritis info    JENAE (obstructive sleep apnea)     mild-severe    Periarthritis of shoulder     Postnasal drip     Sleep apnea     Snoring     Subcutaneous abscess      Weight gain     Wrist sprain         Past Surgical History:   Procedure Laterality Date    CHOLECYSTECTOMY      COLONOSCOPY  approx 2009    normal per patient    LYMPH NODE BIOPSY      NECK SURGERY  Over 20 years    Glands removed    UPPER GASTROINTESTINAL ENDOSCOPY  approx 2011    gerd per patient        Social History     Occupational History    Not on file   Tobacco Use    Smoking status: Never    Smokeless tobacco: Never    Tobacco comments:     Caffeine - 2 cups coffee daily    Vaping Use    Vaping status: Never Used   Substance and Sexual Activity    Alcohol use: Yes     Alcohol/week: 2.0 standard drinks of alcohol     Types: 1 Glasses of wine, 1 Drinks containing 0.5 oz of alcohol per week     Comment: social    Drug use: No    Sexual activity: Not Currently     Partners: Male     Birth control/protection: Condom      Social History     Social History Narrative    Not on file        Family History   Problem Relation Age of Onset    Hypertension Mother     Lung cancer Mother     Heart disease Mother     Stroke Mother     Osteoporosis Mother     Heart disease Father     Hypertension Sister     Diabetes Sister     Rheumatologic disease Sister     Hypertension Brother     Diabetes Brother     Autoimmune disease Other     Heart disease Other         cardiac disorder    Hypertension Other     Thyroid disease Other     Obesity Other     Prostate cancer Other     Myasthenia gravis Other     Vitiligo Other        ROS: 14 point review of systems was performed and all other systems were reviewed and are negative except for documented findings in HPI and today's encounter.     Allergies:   Allergies   Allergen Reactions    Codeine GI Intolerance     vomiting    Lactose Intolerance (Gi) Diarrhea and GI Intolerance    Metformin Nausea And Vomiting    Morphine And Codeine Other (See Comments)     Does not recall    Amoxicillin Rash     All penicillin family    Hctz [Hydrochlorothiazide] Rash     Constitutional:  Denies  "fever, shaking or chills   Eyes:  Denies change in visual acuity   HENT:  Denies nasal congestion or sore throat   Respiratory:  Denies cough or shortness of breath   Cardiovascular:  Denies chest pain or severe LE edema   GI:  Denies abdominal pain, nausea, vomiting, bloody stools or diarrhea   Musculoskeletal:  Numbness, tingling, pain, or loss of motor function only as noted above in history of present illness.  : Denies painful urination or hematuria  Integument:  Denies rash, lesion or ulceration   Neurologic:  Denies headache or focal weakness  Endocrine:  Denies lymphadenopathy  Psych:  Denies confusion or change in mental status   Hem:  Denies active bleeding    OBJECTIVE:  Physical Exam: 63 y.o. female  Wt Readings from Last 3 Encounters:   06/04/24 96.2 kg (212 lb)   05/29/24 97.1 kg (214 lb)   05/21/24 95.3 kg (210 lb)     Ht Readings from Last 1 Encounters:   06/04/24 165.1 cm (65\")     Body mass index is 35.28 kg/m².  Vitals:    06/04/24 1010   Temp: 98.6 °F (37 °C)     Vital signs reviewed.     General Appearance:    Alert, cooperative, in no acute distress                  Eyes: conjunctiva clear  ENT: external ears and nose atraumatic  CV: no peripheral edema  Resp: normal respiratory effort  Skin: no rashes or wounds; normal turgor  Psych: mood and affect appropriate  Lymph: no nodes appreciated  Neuro: gross sensation intact  Vascular:  Palpable peripheral pulse in noted extremity  Musculoskeletal Extremities: skin warm, dry and intact with nvi and capp refill intact, +pms and elbow nttp, sl ttp distal radius and no scaphoid pain    Radiology:   2 views left wrist done for pain with comparison views healing distal radius fracture in good alignment     Assessment:     ICD-10-CM ICD-9-CM   1. Follow-up exam  Z09 V67.9   2. Closed Colles' fracture of right radius, initial encounter  S52.531A 813.41        Procedures    Continue splint ok to remove and do active range of motion - no lift pull push " greater than 5 pounds   MDM/Plan:   The diagnosis(es), natural history, pathophysiology and treatment for diagnosis(es) were discussed. Opportunity given and questions answered.  Biomechanics of pertinent body areas discussed.  When appropriate, the use of ambulatory aids discussed.  EXERCISES:  Advice on benefits of, and types of regular/moderate exercise pertaining to orthopedic diagnosis(es).  MEDICATIONS:  The risks, benefits, warnings,side effects and alternatives of medications discussed.  Inflammation/pain control; with cold, heat, elevation and/or liniments discussed as appropriate  MEDICAL RECORDS reviewed from other provider(s) for past and current medical history pertinent to this complaint.      6/5/2024    Much of this encounter note is an electronic transcription/translation of spoken language to printed text. The electronic translation of spoken language may permit erroneous, or at times, nonsensical words or phrases to be inadvertently transcribed; Although I have reviewed the note for such errors, some may still exist

## 2024-06-07 ENCOUNTER — PATIENT ROUNDING (BHMG ONLY) (OUTPATIENT)
Age: 64
End: 2024-06-07
Payer: MEDICARE

## 2024-06-07 NOTE — PROGRESS NOTES
June 7, 2024      A [employer] Mobile Message has been sent to the patient for PATIENT ROUNDING with [facility] [place]

## 2024-06-26 ENCOUNTER — OFFICE VISIT (OUTPATIENT)
Age: 64
End: 2024-06-26
Payer: MEDICARE

## 2024-06-26 VITALS — HEIGHT: 65 IN | TEMPERATURE: 97.5 F | BODY MASS INDEX: 36.47 KG/M2 | WEIGHT: 218.9 LBS

## 2024-06-26 DIAGNOSIS — M76.822 POSTERIOR TIBIAL TENDINITIS OF BOTH LOWER EXTREMITIES: ICD-10-CM

## 2024-06-26 DIAGNOSIS — M25.571 ACUTE BILATERAL ANKLE PAIN: Primary | ICD-10-CM

## 2024-06-26 DIAGNOSIS — M21.42 PES PLANUS OF BOTH FEET: ICD-10-CM

## 2024-06-26 DIAGNOSIS — M76.821 POSTERIOR TIBIAL TENDINITIS OF BOTH LOWER EXTREMITIES: ICD-10-CM

## 2024-06-26 DIAGNOSIS — R29.898 ANKLE WEAKNESS: ICD-10-CM

## 2024-06-26 DIAGNOSIS — M25.572 ACUTE BILATERAL ANKLE PAIN: Primary | ICD-10-CM

## 2024-06-26 DIAGNOSIS — M21.41 PES PLANUS OF BOTH FEET: ICD-10-CM

## 2024-06-26 NOTE — PROGRESS NOTES
"Chief Complaint   Patient presents with    Left Ankle - Follow-up    Right Foot - Follow-up       History of Present Illness  Brodie is a 63 y.o. year old female here today for follow-up of left ankle and right foot pain that has been present since 5/1/24 when she tripped over a box and then fell straight forward landing on her hand/wrists to catch herself. Initial x-rays showed degenerative changes of the midfoot bilaterally, worse at the TMT joints, along with bilateral plantar calcaneal spurs.  History and exam consistent with foot sprain with signs of ankle strain/tendinitis, with symptoms likely worsened by her pes planus and underlying ankle weakness.  I recommended conservative management. Please see previous notes for complete history and treatment course. She returns today reporting improvement in her symptoms. She states that she feels more stable and does not feel as wobbly. They still \"ache\" and get more painful with increased activity, but pain is rated 4/10 (7/10 previously). Right foot pain is along the middle anterior aspect of the foot, while the left ankle is more painful on the medial and lateral aspects. Has been doing PT once weekly and compliant with HEP. She got new shoes and has been using her orthotics consistently. Has a compound cream that she uses on her neck but has not tried it on her feet/ankles. Tried Voltaren but did not feel like it helped. Advil works best but has been told to switch to Tylenol (history of HTN). Takes gabapentin for fibromyalgia and Naltrexone coumpound tablet for chronic pain. No new injuries or complaints.      Temp 97.5 °F (36.4 °C)   Ht 165.1 cm (65\")   Wt 99.3 kg (218 lb 14.4 oz)   BMI 36.43 kg/m²        Physical Exam  MSK Exam:  The bilateral feet and ankles are without obvious signs of acute bony deformity, swelling, erythema or ecchymosis. There is no tenderness to the medial joint line. There is no tenderness to the lateral joint line. There is no bony " crepitus or step-off. There is bilateral tenderness of the posterior tibial tendon. Diffuse midfoot tenderness has resolved. Mild lateral forefoot tenderness on the left, but no focal findings. Ankle active range of motion is symmetrical and pain-free. Passive range of motion is symmetrical. Instability test are negative with anterior drawer, talar tilt and eversion stress tests. Tibia-fibula squeeze, calcaneal squeeze, and forefoot squeeze tests are negative. Strength has improved and now symmetrical, with mild 4+/5 weakness remaining. No pain with strength testing. There is pes planus bilaterally and pronation with ambulation. No pain with toe raises. Gait is without pain and non-antalgic.      Assessment and Plan  Diagnoses and all orders for this visit:    1. Acute bilateral ankle pain (Primary)    2. Ankle weakness    3. Pes planus of both feet    4. Posterior tibial tendinitis of both lower extremities      Brodie is a 63 y.o. year old female here today for follow-up of left ankle and right foot pain that has been present since 5/1/24 when she tripped over a box and then fell straight forward landing on her hand/wrists to catch herself. Initial x-rays showed degenerative changes of the midfoot bilaterally, worse at the TMT joints, along with bilateral plantar calcaneal spurs.  History and exam consistent with foot sprain with signs of ankle strain/tendinitis, with symptoms likely worsened by her pes planus and underlying ankle weakness.  I recommended conservative management.  She returns today with improvement in her symptoms and on exam.  She continues to have some symptoms of posterior tibial tendinitis bilaterally, as well as symptoms that are likely a result of her pes planus and midfoot degenerative changes.  I recommend continued conservative management with no significant change.  She has shown a good deal of improvement just with increased compliance with appropriate footwear and arch support and I  stressed the importance of continuing to do so.  Recommended trial of previously prescribed compound cream along the feet and ankles.  She should continue with her home exercise program and progress her activity as tolerated. We will follow-up in 8 weeks or sooner as needed. All of her questions were answered and she is agreeable with the plan.    Dictated utilizing Dragon dictation.

## 2024-08-14 ENCOUNTER — OFFICE VISIT (OUTPATIENT)
Age: 64
End: 2024-08-14
Payer: MEDICARE

## 2024-08-14 VITALS — WEIGHT: 219.2 LBS | TEMPERATURE: 98.6 F | HEIGHT: 65 IN | BODY MASS INDEX: 36.52 KG/M2

## 2024-08-14 DIAGNOSIS — S60.212A CONTUSION OF LEFT WRIST, INITIAL ENCOUNTER: ICD-10-CM

## 2024-08-14 DIAGNOSIS — M76.821 POSTERIOR TIBIAL TENDINITIS OF BOTH LOWER EXTREMITIES: ICD-10-CM

## 2024-08-14 DIAGNOSIS — R29.898 ANKLE WEAKNESS: ICD-10-CM

## 2024-08-14 DIAGNOSIS — M21.42 PES PLANUS OF BOTH FEET: ICD-10-CM

## 2024-08-14 DIAGNOSIS — M25.571 ACUTE BILATERAL ANKLE PAIN: Primary | ICD-10-CM

## 2024-08-14 DIAGNOSIS — M76.822 POSTERIOR TIBIAL TENDINITIS OF BOTH LOWER EXTREMITIES: ICD-10-CM

## 2024-08-14 DIAGNOSIS — S93.609D SPRAIN OF FOOT, UNSPECIFIED LATERALITY, SUBSEQUENT ENCOUNTER: ICD-10-CM

## 2024-08-14 DIAGNOSIS — M21.41 PES PLANUS OF BOTH FEET: ICD-10-CM

## 2024-08-14 DIAGNOSIS — M25.572 ACUTE BILATERAL ANKLE PAIN: Primary | ICD-10-CM

## 2024-08-14 RX ORDER — THYROID 30 MG/1
TABLET ORAL
COMMUNITY
Start: 2024-07-31

## 2024-08-14 RX ORDER — METHOCARBAMOL 500 MG/1
500 TABLET, FILM COATED ORAL
COMMUNITY
Start: 2024-07-31

## 2024-08-14 RX ORDER — VALSARTAN 160 MG/1
1 TABLET ORAL DAILY
COMMUNITY
Start: 2024-07-31

## 2024-08-14 NOTE — PROGRESS NOTES
"Chief Complaint   Patient presents with    Left Ankle - Follow-up    Right Foot - Follow-up       History of Present Illness  Brodie is a 63 y.o. year old female here today for follow-up of left ankle and right foot pain that has been present since 5/1/24 when she tripped over a box and then fell straight forward landing on her hand/wrists to catch herself. Initial x-rays showed degenerative changes of the midfoot bilaterally, worse at the TMT joints, along with bilateral plantar calcaneal spurs.  History and exam consistent with foot sprain with signs of ankle strain/tendinitis, with symptoms likely worsened by her pes planus and underlying ankle weakness.  I recommended conservative management.  She last returned with improvement in her symptoms and on exam, with some symptoms of posterior tibial tendinitis bilaterally, as well as symptoms that are likely a result of her pes planus and midfoot degenerative changes. Please see previous notes for complete history and treatment course. She returns today reporting continued improvement. She states that She has been doing really well. Would ache some but symptoms were mild and would not keep her from her regular activity. If she did get a sharp pain, it was very short lived.     She does admit to a new fall on Monday. Was just walking from the fridge to the sink and she fell forward, landed on her knees and hit her wrist on the counter. \"Everything kind of hurts\", but she feels the wrist hurts worse than it did before following the previous fall. She has pain across the dorsal aspect with some pain coming up the forearm rated 4/10.    Has been told to switch to Tylenol (history of HTN). Takes gabapentin for fibromyalgia and Naltrexone coumpound tablet for chronic pain.       Temp 98.6 °F (37 °C) (Temporal)   Ht 165.1 cm (65\")   Wt 99.4 kg (219 lb 3.2 oz)   BMI 36.48 kg/m²        Physical Exam  MSK Exam:  The bilateral feet and ankles are without obvious signs of " acute bony deformity or swelling. There is no tenderness to the ankle joint line. Tenderness of the posterior tibial tendon has resolved. Diffuse midfoot tenderness has resolved. Ankle active range of motion is symmetrical and pain-free. Strength is symmetrical, 5/5, and pain-free. Gait is without pain and non-antalgic.    The left wrist is without obvious signs of acute bony deformity, erythema, or ecchymosis.  There is mild swelling. There is bony tenderness at the distal radius and ulna. No tenderness of the anatomic snuffbox or scapholunate interval. Active and passive range of motion at the wrist are slightly limited and painful at end range.  Strength testing of the wrist and forearm, including supination and pronation, are 4/5.    Left Wrist X-Ray  Indication: Pain  Views: AP, Lateral, and Oblique  Findings:  No fracture  No bony lesion  Normal soft tissues  Widening of the scapholunate joint, but appears unchanged to previous images  Degenerative changes at the first CMC and triscaphe joints  No change compared to images from 5/29/24    Assessment and Plan  Diagnoses and all orders for this visit:    1. Acute bilateral ankle pain (Primary)    2. Posterior tibial tendinitis of both lower extremities    3. Sprain of foot, unspecified laterality, subsequent encounter    4. Ankle weakness    5. Pes planus of both feet    6. Contusion of left wrist, initial encounter    Brodie is a 63 y.o. year old female here today for follow-up of left ankle and right foot pain that has been present since 5/1/24 when she tripped over a box and then fell straight forward landing on her hand/wrists to catch herself. Initial x-rays showed degenerative changes of the midfoot bilaterally, worse at the TMT joints, along with bilateral plantar calcaneal spurs.  History and exam consistent with foot sprain with signs of ankle strain/tendinitis, with symptoms likely worsened by her pes planus and underlying ankle weakness.  I  recommended conservative management.  She last returned with improvement in her symptoms and on exam, with some symptoms of posterior tibial tendinitis bilaterally, as well as symptoms that are likely a result of her pes planus and midfoot degenerative changes. Feet and ankles have been doing well, but she returns today with concerns of worsening left wrist pain after a new fall 2 days ago.  Updated x-rays with no change compared to previous.  I recommended supportive management with rest with possible bracing, ice, and Tylenol as needed for pain and swelling.  May also use previously provided compound cream.  She should continue with previously discussed supportive measures for her foot and ankle pain, which is nearly resolved. We will follow-up as needed. All of her questions were answered and she is agreeable with the plan.    Dictated utilizing Dragon dictation.

## 2024-09-24 ENCOUNTER — TRANSCRIBE ORDERS (OUTPATIENT)
Facility: HOSPITAL | Age: 64
End: 2024-09-24
Payer: MEDICARE

## 2024-09-24 ENCOUNTER — HOSPITAL ENCOUNTER (OUTPATIENT)
Facility: HOSPITAL | Age: 64
Discharge: HOME | End: 2024-09-24
Admitting: INTERNAL MEDICINE
Payer: MEDICARE

## 2024-09-24 DIAGNOSIS — R05.9 COUGH IN ADULT PATIENT: Primary | ICD-10-CM

## 2024-09-24 DIAGNOSIS — R05.9 COUGH IN ADULT PATIENT: ICD-10-CM

## 2024-09-24 PROCEDURE — 71046 X-RAY EXAM CHEST 2 VIEWS: CPT

## 2024-12-12 ENCOUNTER — TRANSCRIBE ORDERS (OUTPATIENT)
Dept: ADMINISTRATIVE | Facility: HOSPITAL | Age: 64
End: 2024-12-12
Payer: MEDICARE